# Patient Record
Sex: MALE | Race: WHITE | NOT HISPANIC OR LATINO | Employment: OTHER | URBAN - NONMETROPOLITAN AREA
[De-identification: names, ages, dates, MRNs, and addresses within clinical notes are randomized per-mention and may not be internally consistent; named-entity substitution may affect disease eponyms.]

---

## 2018-10-27 ENCOUNTER — ANESTHESIA EVENT (OUTPATIENT)
Dept: PERIOP | Facility: HOSPITAL | Age: 83
End: 2018-10-27

## 2018-10-27 ENCOUNTER — APPOINTMENT (OUTPATIENT)
Dept: GENERAL RADIOLOGY | Facility: HOSPITAL | Age: 83
End: 2018-10-27

## 2018-10-27 ENCOUNTER — HOSPITAL ENCOUNTER (INPATIENT)
Facility: HOSPITAL | Age: 83
LOS: 4 days | Discharge: NURSING, REHAB OR LONG-TERM CARE | End: 2018-10-31
Attending: ORTHOPAEDIC SURGERY | Admitting: ORTHOPAEDIC SURGERY

## 2018-10-27 ENCOUNTER — ANESTHESIA (OUTPATIENT)
Dept: PERIOP | Facility: HOSPITAL | Age: 83
End: 2018-10-27

## 2018-10-27 DIAGNOSIS — Z79.01 ANTICOAGULATION GOAL OF INR 1.5 TO 2.5: ICD-10-CM

## 2018-10-27 DIAGNOSIS — S72.142D CLOSED DISPLACED INTERTROCHANTERIC FRACTURE OF LEFT FEMUR WITH ROUTINE HEALING, SUBSEQUENT ENCOUNTER: Primary | ICD-10-CM

## 2018-10-27 DIAGNOSIS — Z51.81 ANTICOAGULATION GOAL OF INR 1.5 TO 2.5: ICD-10-CM

## 2018-10-27 DIAGNOSIS — Z74.09 IMPAIRED FUNCTIONAL MOBILITY AND ACTIVITY TOLERANCE: ICD-10-CM

## 2018-10-27 PROBLEM — S72.009A HIP FRACTURE (HCC): Status: ACTIVE | Noted: 2018-10-27

## 2018-10-27 PROBLEM — S72.142A CLOSED DISPLACED INTERTROCHANTERIC FRACTURE OF LEFT FEMUR (HCC): Status: ACTIVE | Noted: 2018-10-27

## 2018-10-27 LAB
ABO GROUP BLD: NORMAL
ALBUMIN SERPL-MCNC: 3.3 G/DL (ref 3.5–5)
ALBUMIN/GLOB SERPL: 1.3 G/DL (ref 1.1–2.5)
ALP SERPL-CCNC: 92 U/L (ref 24–120)
ALT SERPL W P-5'-P-CCNC: 21 U/L (ref 0–54)
ANION GAP SERPL CALCULATED.3IONS-SCNC: 8 MMOL/L (ref 4–13)
AST SERPL-CCNC: 16 U/L (ref 7–45)
BILIRUB SERPL-MCNC: 0.6 MG/DL (ref 0.1–1)
BLD GP AB SCN SERPL QL: NEGATIVE
BUN BLD-MCNC: 22 MG/DL (ref 5–21)
BUN/CREAT SERPL: 25.3 (ref 7–25)
CALCIUM SPEC-SCNC: 8.9 MG/DL (ref 8.4–10.4)
CHLORIDE SERPL-SCNC: 99 MMOL/L (ref 98–110)
CO2 SERPL-SCNC: 28 MMOL/L (ref 24–31)
CREAT BLD-MCNC: 0.87 MG/DL (ref 0.5–1.4)
DEPRECATED RDW RBC AUTO: 43.9 FL (ref 40–54)
ERYTHROCYTE [DISTWIDTH] IN BLOOD BY AUTOMATED COUNT: 14.2 % (ref 12–15)
GFR SERPL CREATININE-BSD FRML MDRD: 83 ML/MIN/1.73
GLOBULIN UR ELPH-MCNC: 2.5 GM/DL
GLUCOSE BLD-MCNC: 121 MG/DL (ref 70–100)
HCT VFR BLD AUTO: 30.3 % (ref 40–52)
HGB BLD-MCNC: 10.2 G/DL (ref 14–18)
MCH RBC QN AUTO: 28.6 PG (ref 28–32)
MCHC RBC AUTO-ENTMCNC: 33.7 G/DL (ref 33–36)
MCV RBC AUTO: 84.9 FL (ref 82–95)
PLATELET # BLD AUTO: 256 10*3/MM3 (ref 130–400)
PMV BLD AUTO: 10.5 FL (ref 6–12)
POTASSIUM BLD-SCNC: 4.2 MMOL/L (ref 3.5–5.3)
PROT SERPL-MCNC: 5.8 G/DL (ref 6.3–8.7)
RBC # BLD AUTO: 3.57 10*6/MM3 (ref 4.8–5.9)
RH BLD: POSITIVE
SODIUM BLD-SCNC: 135 MMOL/L (ref 135–145)
T&S EXPIRATION DATE: NORMAL
WBC NRBC COR # BLD: 14.88 10*3/MM3 (ref 4.8–10.8)

## 2018-10-27 PROCEDURE — 25010000002 DEXAMETHASONE PER 1 MG: Performed by: NURSE ANESTHETIST, CERTIFIED REGISTERED

## 2018-10-27 PROCEDURE — 86920 COMPATIBILITY TEST SPIN: CPT

## 2018-10-27 PROCEDURE — 93005 ELECTROCARDIOGRAM TRACING: CPT | Performed by: ORTHOPAEDIC SURGERY

## 2018-10-27 PROCEDURE — 80053 COMPREHEN METABOLIC PANEL: CPT | Performed by: ORTHOPAEDIC SURGERY

## 2018-10-27 PROCEDURE — 86901 BLOOD TYPING SEROLOGIC RH(D): CPT | Performed by: ORTHOPAEDIC SURGERY

## 2018-10-27 PROCEDURE — 86900 BLOOD TYPING SEROLOGIC ABO: CPT

## 2018-10-27 PROCEDURE — 94799 UNLISTED PULMONARY SVC/PX: CPT

## 2018-10-27 PROCEDURE — 76000 FLUOROSCOPY <1 HR PHYS/QHP: CPT

## 2018-10-27 PROCEDURE — 36430 TRANSFUSION BLD/BLD COMPNT: CPT

## 2018-10-27 PROCEDURE — 25010000003 CEFAZOLIN 1-4 GM/50ML-% SOLUTION: Performed by: ORTHOPAEDIC SURGERY

## 2018-10-27 PROCEDURE — C1769 GUIDE WIRE: HCPCS | Performed by: ORTHOPAEDIC SURGERY

## 2018-10-27 PROCEDURE — C1713 ANCHOR/SCREW BN/BN,TIS/BN: HCPCS | Performed by: ORTHOPAEDIC SURGERY

## 2018-10-27 PROCEDURE — 94760 N-INVAS EAR/PLS OXIMETRY 1: CPT

## 2018-10-27 PROCEDURE — 25010000002 PROPOFOL 10 MG/ML EMULSION: Performed by: NURSE ANESTHETIST, CERTIFIED REGISTERED

## 2018-10-27 PROCEDURE — 85027 COMPLETE CBC AUTOMATED: CPT | Performed by: ORTHOPAEDIC SURGERY

## 2018-10-27 PROCEDURE — 25010000002 ONDANSETRON PER 1 MG: Performed by: NURSE ANESTHETIST, CERTIFIED REGISTERED

## 2018-10-27 PROCEDURE — 73502 X-RAY EXAM HIP UNI 2-3 VIEWS: CPT

## 2018-10-27 PROCEDURE — 25010000002 FENTANYL CITRATE (PF) 100 MCG/2ML SOLUTION: Performed by: NURSE ANESTHETIST, CERTIFIED REGISTERED

## 2018-10-27 PROCEDURE — 0QH706Z INSERTION OF INTRAMEDULLARY INTERNAL FIXATION DEVICE INTO LEFT UPPER FEMUR, OPEN APPROACH: ICD-10-PCS | Performed by: ORTHOPAEDIC SURGERY

## 2018-10-27 PROCEDURE — 25010000003 MORPHINE PER 100 MG: Performed by: ORTHOPAEDIC SURGERY

## 2018-10-27 PROCEDURE — 25010000003 CEFAZOLIN PER 500 MG: Performed by: NURSE ANESTHETIST, CERTIFIED REGISTERED

## 2018-10-27 PROCEDURE — 30233N1 TRANSFUSION OF NONAUTOLOGOUS RED BLOOD CELLS INTO PERIPHERAL VEIN, PERCUTANEOUS APPROACH: ICD-10-PCS | Performed by: ORTHOPAEDIC SURGERY

## 2018-10-27 PROCEDURE — 71045 X-RAY EXAM CHEST 1 VIEW: CPT

## 2018-10-27 PROCEDURE — 86900 BLOOD TYPING SEROLOGIC ABO: CPT | Performed by: ORTHOPAEDIC SURGERY

## 2018-10-27 PROCEDURE — 25010000002 FENTANYL CITRATE (PF) 250 MCG/5ML SOLUTION: Performed by: NURSE ANESTHETIST, CERTIFIED REGISTERED

## 2018-10-27 PROCEDURE — 86850 RBC ANTIBODY SCREEN: CPT | Performed by: ORTHOPAEDIC SURGERY

## 2018-10-27 PROCEDURE — 93010 ELECTROCARDIOGRAM REPORT: CPT | Performed by: INTERNAL MEDICINE

## 2018-10-27 PROCEDURE — P9016 RBC LEUKOCYTES REDUCED: HCPCS

## 2018-10-27 DEVICE — NAIL FEM TFN ADV PROX 130D 11X235MM LT STRL: Type: IMPLANTABLE DEVICE | Status: FUNCTIONAL

## 2018-10-27 DEVICE — IMPLANTABLE DEVICE: Type: IMPLANTABLE DEVICE | Status: FUNCTIONAL

## 2018-10-27 DEVICE — SCRW LK STRDRV TI 5X36MM STRL: Type: IMPLANTABLE DEVICE | Status: FUNCTIONAL

## 2018-10-27 RX ORDER — ONDANSETRON 2 MG/ML
INJECTION INTRAMUSCULAR; INTRAVENOUS AS NEEDED
Status: DISCONTINUED | OUTPATIENT
Start: 2018-10-27 | End: 2018-10-27 | Stop reason: SURG

## 2018-10-27 RX ORDER — HYDROCODONE BITARTRATE AND ACETAMINOPHEN 5; 325 MG/1; MG/1
1 TABLET ORAL EVERY 4 HOURS PRN
Status: DISCONTINUED | OUTPATIENT
Start: 2018-10-27 | End: 2018-10-31 | Stop reason: HOSPADM

## 2018-10-27 RX ORDER — PROPOFOL 10 MG/ML
VIAL (ML) INTRAVENOUS AS NEEDED
Status: DISCONTINUED | OUTPATIENT
Start: 2018-10-27 | End: 2018-10-27 | Stop reason: SURG

## 2018-10-27 RX ORDER — MORPHINE SULFATE/0.9% NACL/PF 1 MG/ML
SYRINGE (ML) INJECTION CONTINUOUS
Status: DISCONTINUED | OUTPATIENT
Start: 2018-10-27 | End: 2018-10-28

## 2018-10-27 RX ORDER — LANOLIN ALCOHOL/MO/W.PET/CERES
1000 CREAM (GRAM) TOPICAL
COMMUNITY

## 2018-10-27 RX ORDER — WARFARIN SODIUM 3 MG/1
3 TABLET ORAL
Status: DISCONTINUED | OUTPATIENT
Start: 2018-10-28 | End: 2018-10-31 | Stop reason: HOSPADM

## 2018-10-27 RX ORDER — HYDRALAZINE HYDROCHLORIDE 20 MG/ML
5 INJECTION INTRAMUSCULAR; INTRAVENOUS
Status: DISCONTINUED | OUTPATIENT
Start: 2018-10-27 | End: 2018-10-27 | Stop reason: HOSPADM

## 2018-10-27 RX ORDER — MEPERIDINE HYDROCHLORIDE 25 MG/ML
12.5 INJECTION INTRAMUSCULAR; INTRAVENOUS; SUBCUTANEOUS
Status: DISCONTINUED | OUTPATIENT
Start: 2018-10-27 | End: 2018-10-27 | Stop reason: HOSPADM

## 2018-10-27 RX ORDER — SODIUM CHLORIDE, SODIUM LACTATE, POTASSIUM CHLORIDE, CALCIUM CHLORIDE 600; 310; 30; 20 MG/100ML; MG/100ML; MG/100ML; MG/100ML
100 INJECTION, SOLUTION INTRAVENOUS CONTINUOUS
Status: DISCONTINUED | OUTPATIENT
Start: 2018-10-27 | End: 2018-10-27

## 2018-10-27 RX ORDER — SODIUM CHLORIDE, SODIUM LACTATE, POTASSIUM CHLORIDE, CALCIUM CHLORIDE 600; 310; 30; 20 MG/100ML; MG/100ML; MG/100ML; MG/100ML
75 INJECTION, SOLUTION INTRAVENOUS CONTINUOUS
Status: DISCONTINUED | OUTPATIENT
Start: 2018-10-27 | End: 2018-10-28

## 2018-10-27 RX ORDER — PHENYLEPHRINE HCL IN 0.9% NACL 0.8MG/10ML
SYRINGE (ML) INTRAVENOUS AS NEEDED
Status: DISCONTINUED | OUTPATIENT
Start: 2018-10-27 | End: 2018-10-27 | Stop reason: SURG

## 2018-10-27 RX ORDER — FENTANYL CITRATE 50 UG/ML
INJECTION, SOLUTION INTRAMUSCULAR; INTRAVENOUS AS NEEDED
Status: DISCONTINUED | OUTPATIENT
Start: 2018-10-27 | End: 2018-10-27 | Stop reason: SURG

## 2018-10-27 RX ORDER — LIDOCAINE HYDROCHLORIDE 20 MG/ML
INJECTION, SOLUTION INFILTRATION; PERINEURAL AS NEEDED
Status: DISCONTINUED | OUTPATIENT
Start: 2018-10-27 | End: 2018-10-27 | Stop reason: SURG

## 2018-10-27 RX ORDER — DEXAMETHASONE SODIUM PHOSPHATE 4 MG/ML
INJECTION, SOLUTION INTRA-ARTICULAR; INTRALESIONAL; INTRAMUSCULAR; INTRAVENOUS; SOFT TISSUE AS NEEDED
Status: DISCONTINUED | OUTPATIENT
Start: 2018-10-27 | End: 2018-10-27 | Stop reason: SURG

## 2018-10-27 RX ORDER — BISACODYL 5 MG/1
10 TABLET, DELAYED RELEASE ORAL DAILY PRN
COMMUNITY

## 2018-10-27 RX ORDER — ONDANSETRON 2 MG/ML
4 INJECTION INTRAMUSCULAR; INTRAVENOUS EVERY 6 HOURS PRN
Status: DISCONTINUED | OUTPATIENT
Start: 2018-10-27 | End: 2018-10-31 | Stop reason: HOSPADM

## 2018-10-27 RX ORDER — NALOXONE HCL 0.4 MG/ML
0.04 VIAL (ML) INJECTION AS NEEDED
Status: DISCONTINUED | OUTPATIENT
Start: 2018-10-27 | End: 2018-10-27 | Stop reason: HOSPADM

## 2018-10-27 RX ORDER — ONDANSETRON 2 MG/ML
4 INJECTION INTRAMUSCULAR; INTRAVENOUS AS NEEDED
Status: DISCONTINUED | OUTPATIENT
Start: 2018-10-27 | End: 2018-10-27 | Stop reason: HOSPADM

## 2018-10-27 RX ORDER — QUETIAPINE FUMARATE 50 MG/1
50 TABLET, FILM COATED ORAL NIGHTLY
COMMUNITY

## 2018-10-27 RX ORDER — HYDROCHLOROTHIAZIDE 25 MG/1
25 TABLET ORAL DAILY
COMMUNITY

## 2018-10-27 RX ORDER — MAGNESIUM HYDROXIDE 1200 MG/15ML
LIQUID ORAL AS NEEDED
Status: DISCONTINUED | OUTPATIENT
Start: 2018-10-27 | End: 2018-10-27 | Stop reason: HOSPADM

## 2018-10-27 RX ORDER — ERGOCALCIFEROL 1.25 MG/1
50000 CAPSULE ORAL WEEKLY
COMMUNITY

## 2018-10-27 RX ORDER — CEFAZOLIN SODIUM 1 G/3ML
INJECTION, POWDER, FOR SOLUTION INTRAMUSCULAR; INTRAVENOUS AS NEEDED
Status: DISCONTINUED | OUTPATIENT
Start: 2018-10-27 | End: 2018-10-27 | Stop reason: SURG

## 2018-10-27 RX ORDER — ONDANSETRON 4 MG/1
4 TABLET, ORALLY DISINTEGRATING ORAL EVERY 6 HOURS PRN
Status: DISCONTINUED | OUTPATIENT
Start: 2018-10-27 | End: 2018-10-31 | Stop reason: HOSPADM

## 2018-10-27 RX ORDER — BISACODYL 5 MG/1
10 TABLET, DELAYED RELEASE ORAL DAILY PRN
Status: DISCONTINUED | OUTPATIENT
Start: 2018-10-27 | End: 2018-10-31 | Stop reason: HOSPADM

## 2018-10-27 RX ORDER — METOCLOPRAMIDE HYDROCHLORIDE 5 MG/ML
5 INJECTION INTRAMUSCULAR; INTRAVENOUS
Status: DISCONTINUED | OUTPATIENT
Start: 2018-10-27 | End: 2018-10-27 | Stop reason: HOSPADM

## 2018-10-27 RX ORDER — OXYCODONE AND ACETAMINOPHEN 10; 325 MG/1; MG/1
1 TABLET ORAL ONCE AS NEEDED
Status: DISCONTINUED | OUTPATIENT
Start: 2018-10-27 | End: 2018-10-27 | Stop reason: HOSPADM

## 2018-10-27 RX ORDER — MORPHINE SULFATE 2 MG/ML
2 INJECTION, SOLUTION INTRAMUSCULAR; INTRAVENOUS
Status: DISCONTINUED | OUTPATIENT
Start: 2018-10-27 | End: 2018-10-27 | Stop reason: HOSPADM

## 2018-10-27 RX ORDER — HYDROCHLOROTHIAZIDE 25 MG/1
25 TABLET ORAL DAILY
Status: DISCONTINUED | OUTPATIENT
Start: 2018-10-27 | End: 2018-10-31 | Stop reason: HOSPADM

## 2018-10-27 RX ORDER — QUETIAPINE FUMARATE 25 MG/1
50 TABLET, FILM COATED ORAL NIGHTLY
Status: DISCONTINUED | OUTPATIENT
Start: 2018-10-27 | End: 2018-10-31 | Stop reason: HOSPADM

## 2018-10-27 RX ORDER — POLYETHYLENE GLYCOL 3350 17 G/17G
17 POWDER, FOR SOLUTION ORAL DAILY
COMMUNITY

## 2018-10-27 RX ORDER — FLUMAZENIL 0.1 MG/ML
0.2 INJECTION INTRAVENOUS AS NEEDED
Status: DISCONTINUED | OUTPATIENT
Start: 2018-10-27 | End: 2018-10-27 | Stop reason: HOSPADM

## 2018-10-27 RX ORDER — LABETALOL HYDROCHLORIDE 5 MG/ML
5 INJECTION, SOLUTION INTRAVENOUS
Status: DISCONTINUED | OUTPATIENT
Start: 2018-10-27 | End: 2018-10-27 | Stop reason: HOSPADM

## 2018-10-27 RX ORDER — POLYETHYLENE GLYCOL 3350 17 G/17G
17 POWDER, FOR SOLUTION ORAL DAILY
Status: DISCONTINUED | OUTPATIENT
Start: 2018-10-27 | End: 2018-10-31 | Stop reason: HOSPADM

## 2018-10-27 RX ORDER — ONDANSETRON 4 MG/1
4 TABLET, FILM COATED ORAL EVERY 6 HOURS PRN
Status: DISCONTINUED | OUTPATIENT
Start: 2018-10-27 | End: 2018-10-31 | Stop reason: HOSPADM

## 2018-10-27 RX ORDER — ROCURONIUM BROMIDE 10 MG/ML
INJECTION, SOLUTION INTRAVENOUS AS NEEDED
Status: DISCONTINUED | OUTPATIENT
Start: 2018-10-27 | End: 2018-10-27 | Stop reason: SURG

## 2018-10-27 RX ORDER — IPRATROPIUM BROMIDE AND ALBUTEROL SULFATE 2.5; .5 MG/3ML; MG/3ML
3 SOLUTION RESPIRATORY (INHALATION) ONCE AS NEEDED
Status: DISCONTINUED | OUTPATIENT
Start: 2018-10-27 | End: 2018-10-27 | Stop reason: HOSPADM

## 2018-10-27 RX ORDER — CEFAZOLIN SODIUM 1 G/50ML
1 INJECTION, SOLUTION INTRAVENOUS EVERY 8 HOURS
Status: COMPLETED | OUTPATIENT
Start: 2018-10-27 | End: 2018-10-28

## 2018-10-27 RX ORDER — FENTANYL CITRATE 50 UG/ML
25 INJECTION, SOLUTION INTRAMUSCULAR; INTRAVENOUS AS NEEDED
Status: DISCONTINUED | OUTPATIENT
Start: 2018-10-27 | End: 2018-10-27 | Stop reason: HOSPADM

## 2018-10-27 RX ORDER — ONDANSETRON 2 MG/ML
INJECTION INTRAMUSCULAR; INTRAVENOUS AS NEEDED
Status: DISCONTINUED | OUTPATIENT
Start: 2018-10-27 | End: 2018-10-27

## 2018-10-27 RX ADMIN — CEFAZOLIN 2 G: 1 INJECTION, POWDER, FOR SOLUTION INTRAVENOUS at 10:38

## 2018-10-27 RX ADMIN — SODIUM CHLORIDE, POTASSIUM CHLORIDE, SODIUM LACTATE AND CALCIUM CHLORIDE 75 ML/HR: 600; 310; 30; 20 INJECTION, SOLUTION INTRAVENOUS at 13:33

## 2018-10-27 RX ADMIN — Medication 80 MCG: at 11:21

## 2018-10-27 RX ADMIN — FENTANYL CITRATE 50 MCG: 50 INJECTION INTRAMUSCULAR; INTRAVENOUS at 12:10

## 2018-10-27 RX ADMIN — PROPOFOL 120 MG: 10 INJECTION, EMULSION INTRAVENOUS at 10:19

## 2018-10-27 RX ADMIN — SODIUM CHLORIDE, POTASSIUM CHLORIDE, SODIUM LACTATE AND CALCIUM CHLORIDE 75 ML/HR: 600; 310; 30; 20 INJECTION, SOLUTION INTRAVENOUS at 19:55

## 2018-10-27 RX ADMIN — QUETIAPINE FUMARATE 50 MG: 25 TABLET, FILM COATED ORAL at 20:53

## 2018-10-27 RX ADMIN — SODIUM CHLORIDE, POTASSIUM CHLORIDE, SODIUM LACTATE AND CALCIUM CHLORIDE 100 ML/HR: 600; 310; 30; 20 INJECTION, SOLUTION INTRAVENOUS at 04:16

## 2018-10-27 RX ADMIN — Medication 160 MCG: at 11:44

## 2018-10-27 RX ADMIN — ONDANSETRON HYDROCHLORIDE 4 MG: 2 SOLUTION INTRAMUSCULAR; INTRAVENOUS at 12:08

## 2018-10-27 RX ADMIN — DEXAMETHASONE SODIUM PHOSPHATE 8 MG: 4 INJECTION, SOLUTION INTRAMUSCULAR; INTRAVENOUS at 10:38

## 2018-10-27 RX ADMIN — Medication 160 MCG: at 11:30

## 2018-10-27 RX ADMIN — ROCURONIUM BROMIDE 20 MG: 10 INJECTION INTRAVENOUS at 10:19

## 2018-10-27 RX ADMIN — SODIUM CHLORIDE, POTASSIUM CHLORIDE, SODIUM LACTATE AND CALCIUM CHLORIDE 100 ML/HR: 600; 310; 30; 20 INJECTION, SOLUTION INTRAVENOUS at 13:13

## 2018-10-27 RX ADMIN — FENTANYL CITRATE 100 MCG: 50 INJECTION, SOLUTION INTRAMUSCULAR; INTRAVENOUS at 10:18

## 2018-10-27 RX ADMIN — FENTANYL CITRATE 100 MCG: 50 INJECTION INTRAMUSCULAR; INTRAVENOUS at 11:07

## 2018-10-27 RX ADMIN — LIDOCAINE HYDROCHLORIDE 100 MG: 20 INJECTION, SOLUTION INFILTRATION; PERINEURAL at 10:19

## 2018-10-27 RX ADMIN — LIDOCAINE HYDROCHLORIDE 100 MG: 20 INJECTION, SOLUTION INFILTRATION; PERINEURAL at 11:07

## 2018-10-27 RX ADMIN — FENTANYL CITRATE 50 MCG: 50 INJECTION INTRAMUSCULAR; INTRAVENOUS at 10:50

## 2018-10-27 RX ADMIN — MORPHINE SULFATE: 25 INJECTION, SOLUTION, CONCENTRATE INTRAVENOUS at 04:35

## 2018-10-27 RX ADMIN — FENTANYL CITRATE 50 MCG: 50 INJECTION INTRAMUSCULAR; INTRAVENOUS at 10:30

## 2018-10-27 RX ADMIN — CEFAZOLIN SODIUM 1 G: 1 INJECTION, SOLUTION INTRAVENOUS at 18:00

## 2018-10-27 NOTE — ANESTHESIA POSTPROCEDURE EVALUATION
Patient: Burke Reinoso    Procedure Summary     Date:  10/27/18 Room / Location:  [facility] OR  /  [facility] OR    Anesthesia Start:  1014 Anesthesia Stop:  1219    Procedure:  HIP TROCANTERIC NAILING SHORT WITH INTRAMEDULLARY HIP SCREW (Left Hip) Diagnosis:      Surgeon:  Juan Colon MD Provider:  Sonya Peralta CRNA    Anesthesia Type:  general ASA Status:  2          Anesthesia Type: No value filed.  Last vitals  BP   148/62 (10/27/18 1250)   Temp   98.3 °F (36.8 °C) (10/27/18 1216)   Pulse   88 (10/27/18 1255)   Resp   18 (10/27/18 1245)     SpO2   (!) 80 % (10/27/18 1255)     Post Anesthesia Care and Evaluation    Patient location during evaluation: PACU  Patient participation: complete - patient participated  Level of consciousness: awake  Pain score: 2  Pain management: adequate  Airway patency: patent  PONV Status: none  Cardiovascular status: acceptable  Respiratory status: acceptable  Hydration status: acceptable

## 2018-10-27 NOTE — ANESTHESIA PREPROCEDURE EVALUATION
Anesthesia Evaluation     Patient summary reviewed and Nursing notes reviewed   no history of anesthetic complications:  NPO Solid Status: > 8 hours  NPO Liquid Status: > 8 hours           Airway   Mallampati: I  TM distance: >3 FB  Neck ROM: full  No difficulty expected  Dental - normal exam     Pulmonary - negative pulmonary ROS and normal exam   Cardiovascular - normal exam  Exercise tolerance: good (4-7 METS)    (+) hypertension well controlled,       Neuro/Psych  (+) dementia,     GI/Hepatic/Renal/Endo    (+)  GERD,      Musculoskeletal     (+) gait problem (daughter states he has began to shuffle, and had difficulty moving from a seated to standing position.),   Abdominal  - normal exam    Bowel sounds: normal.   Substance History - negative use     OB/GYN negative ob/gyn ROS         Other   (+) arthritis     ROS/Med Hx Other: Discussed patient history with patient's daughter.            Lab Results   Component Value Date    WBC 14.88 (H) 10/27/2018    HGB 10.2 (L) 10/27/2018    HCT 30.3 (L) 10/27/2018    MCV 84.9 10/27/2018     10/27/2018              Anesthesia Plan    ASA 2     general     intravenous induction   Anesthetic plan, all risks, benefits, and alternatives have been provided, discussed and informed consent has been obtained with: patient and child.  Use of blood products discussed with child and patient  Consented to blood products.

## 2018-10-27 NOTE — ANESTHESIA PROCEDURE NOTES
Airway  Urgency: elective    Airway not difficult    General Information and Staff    Patient location during procedure: OR  CRNA: SANGITA NEVILLE    Indications and Patient Condition  Indications for airway management: airway protection    Preoxygenated: yes  Mask difficulty assessment: 1 - vent by mask    Final Airway Details  Final airway type: endotracheal airway      Successful airway: ETT  Cuffed: yes   Successful intubation technique: direct laryngoscopy  Facilitating devices/methods: intubating stylet  Endotracheal tube insertion site: oral  Blade: Vitale  Blade size: 2  ETT size: 7.0 mm  Cormack-Lehane Classification: grade I - full view of glottis  Placement verified by: chest auscultation and capnometry   Cuff volume (mL): 8  Measured from: lips  ETT to lips (cm): 22  Number of attempts at approach: 1    Additional Comments  Easy, atraumatic intubation

## 2018-10-28 LAB
ABO + RH BLD: NORMAL
ABO + RH BLD: NORMAL
BH BB BLOOD EXPIRATION DATE: NORMAL
BH BB BLOOD EXPIRATION DATE: NORMAL
BH BB BLOOD TYPE BARCODE: 5100
BH BB BLOOD TYPE BARCODE: 5100
BH BB DISPENSE STATUS: NORMAL
BH BB DISPENSE STATUS: NORMAL
BH BB PRODUCT CODE: NORMAL
BH BB PRODUCT CODE: NORMAL
BH BB UNIT NUMBER: NORMAL
BH BB UNIT NUMBER: NORMAL
CROSSMATCH INTERPRETATION: NORMAL
CROSSMATCH INTERPRETATION: NORMAL
HCT VFR BLD AUTO: 28.8 % (ref 40–52)
HGB BLD-MCNC: 9.8 G/DL (ref 14–18)
INR PPP: 1.15 (ref 0.91–1.09)
PROTHROMBIN TIME: 15.1 SECONDS (ref 11.9–14.6)
UNIT  ABO: NORMAL
UNIT  ABO: NORMAL
UNIT  RH: NORMAL
UNIT  RH: NORMAL

## 2018-10-28 PROCEDURE — 25010000003 CEFAZOLIN 1-4 GM/50ML-% SOLUTION: Performed by: ORTHOPAEDIC SURGERY

## 2018-10-28 PROCEDURE — 97530 THERAPEUTIC ACTIVITIES: CPT

## 2018-10-28 PROCEDURE — 97161 PT EVAL LOW COMPLEX 20 MIN: CPT | Performed by: PHYSICAL THERAPIST

## 2018-10-28 PROCEDURE — G8978 MOBILITY CURRENT STATUS: HCPCS | Performed by: PHYSICAL THERAPIST

## 2018-10-28 PROCEDURE — 25010000003 MORPHINE PER 100 MG: Performed by: ORTHOPAEDIC SURGERY

## 2018-10-28 PROCEDURE — 85610 PROTHROMBIN TIME: CPT | Performed by: ORTHOPAEDIC SURGERY

## 2018-10-28 PROCEDURE — 94799 UNLISTED PULMONARY SVC/PX: CPT

## 2018-10-28 PROCEDURE — G8979 MOBILITY GOAL STATUS: HCPCS | Performed by: PHYSICAL THERAPIST

## 2018-10-28 PROCEDURE — 85014 HEMATOCRIT: CPT | Performed by: ORTHOPAEDIC SURGERY

## 2018-10-28 PROCEDURE — 85018 HEMOGLOBIN: CPT | Performed by: ORTHOPAEDIC SURGERY

## 2018-10-28 PROCEDURE — 94760 N-INVAS EAR/PLS OXIMETRY 1: CPT

## 2018-10-28 RX ADMIN — QUETIAPINE FUMARATE 50 MG: 25 TABLET, FILM COATED ORAL at 20:55

## 2018-10-28 RX ADMIN — POLYETHYLENE GLYCOL (3350) 17 G: 17 POWDER, FOR SOLUTION ORAL at 09:39

## 2018-10-28 RX ADMIN — WARFARIN SODIUM 3 MG: 3 TABLET ORAL at 17:47

## 2018-10-28 RX ADMIN — HYDROCODONE BITARTRATE AND ACETAMINOPHEN 1 TABLET: 5; 325 TABLET ORAL at 19:50

## 2018-10-28 RX ADMIN — CEFAZOLIN SODIUM 1 G: 1 INJECTION, SOLUTION INTRAVENOUS at 02:21

## 2018-10-28 RX ADMIN — HYDROCODONE BITARTRATE AND ACETAMINOPHEN 1 TABLET: 5; 325 TABLET ORAL at 09:43

## 2018-10-28 RX ADMIN — MORPHINE SULFATE: 25 INJECTION, SOLUTION, CONCENTRATE INTRAVENOUS at 04:59

## 2018-10-28 RX ADMIN — HYDROCODONE BITARTRATE AND ACETAMINOPHEN 1 TABLET: 5; 325 TABLET ORAL at 15:40

## 2018-10-29 LAB
HCT VFR BLD AUTO: 29.3 % (ref 40–52)
HGB BLD-MCNC: 9.7 G/DL (ref 14–18)
INR PPP: 1.1 (ref 0.91–1.09)
PROTHROMBIN TIME: 14.6 SECONDS (ref 11.9–14.6)

## 2018-10-29 PROCEDURE — 85014 HEMATOCRIT: CPT | Performed by: ORTHOPAEDIC SURGERY

## 2018-10-29 PROCEDURE — 85610 PROTHROMBIN TIME: CPT | Performed by: ORTHOPAEDIC SURGERY

## 2018-10-29 PROCEDURE — 85018 HEMOGLOBIN: CPT | Performed by: ORTHOPAEDIC SURGERY

## 2018-10-29 PROCEDURE — 97530 THERAPEUTIC ACTIVITIES: CPT

## 2018-10-29 RX ADMIN — POLYETHYLENE GLYCOL (3350) 17 G: 17 POWDER, FOR SOLUTION ORAL at 08:27

## 2018-10-29 RX ADMIN — HYDROCODONE BITARTRATE AND ACETAMINOPHEN 1 TABLET: 5; 325 TABLET ORAL at 00:07

## 2018-10-29 RX ADMIN — HYDROCODONE BITARTRATE AND ACETAMINOPHEN 1 TABLET: 5; 325 TABLET ORAL at 04:27

## 2018-10-29 RX ADMIN — HYDROCODONE BITARTRATE AND ACETAMINOPHEN 1 TABLET: 5; 325 TABLET ORAL at 20:12

## 2018-10-29 RX ADMIN — WARFARIN SODIUM 3 MG: 3 TABLET ORAL at 17:06

## 2018-10-29 RX ADMIN — ONDANSETRON 4 MG: 4 TABLET, ORALLY DISINTEGRATING ORAL at 04:27

## 2018-10-29 RX ADMIN — HYDROCODONE BITARTRATE AND ACETAMINOPHEN 1 TABLET: 5; 325 TABLET ORAL at 10:10

## 2018-10-29 RX ADMIN — QUETIAPINE FUMARATE 50 MG: 25 TABLET, FILM COATED ORAL at 20:12

## 2018-10-30 LAB
HCT VFR BLD AUTO: 28.2 % (ref 40–52)
HGB BLD-MCNC: 9.2 G/DL (ref 14–18)
INR PPP: 1.26 (ref 0.91–1.09)
PROTHROMBIN TIME: 16.2 SECONDS (ref 11.9–14.6)

## 2018-10-30 PROCEDURE — 85014 HEMATOCRIT: CPT | Performed by: ORTHOPAEDIC SURGERY

## 2018-10-30 PROCEDURE — 85018 HEMOGLOBIN: CPT | Performed by: ORTHOPAEDIC SURGERY

## 2018-10-30 PROCEDURE — 97110 THERAPEUTIC EXERCISES: CPT

## 2018-10-30 PROCEDURE — 85610 PROTHROMBIN TIME: CPT | Performed by: ORTHOPAEDIC SURGERY

## 2018-10-30 PROCEDURE — 97116 GAIT TRAINING THERAPY: CPT

## 2018-10-30 PROCEDURE — 97530 THERAPEUTIC ACTIVITIES: CPT

## 2018-10-30 RX ADMIN — HYDROCODONE BITARTRATE AND ACETAMINOPHEN 1 TABLET: 5; 325 TABLET ORAL at 20:05

## 2018-10-30 RX ADMIN — HYDROCODONE BITARTRATE AND ACETAMINOPHEN 1 TABLET: 5; 325 TABLET ORAL at 13:20

## 2018-10-30 RX ADMIN — POLYETHYLENE GLYCOL (3350) 17 G: 17 POWDER, FOR SOLUTION ORAL at 08:43

## 2018-10-30 RX ADMIN — WARFARIN SODIUM 3 MG: 3 TABLET ORAL at 18:13

## 2018-10-30 RX ADMIN — QUETIAPINE FUMARATE 50 MG: 25 TABLET, FILM COATED ORAL at 20:03

## 2018-10-30 RX ADMIN — HYDROCODONE BITARTRATE AND ACETAMINOPHEN 1 TABLET: 5; 325 TABLET ORAL at 07:48

## 2018-10-31 VITALS
DIASTOLIC BLOOD PRESSURE: 55 MMHG | BODY MASS INDEX: 22.13 KG/M2 | HEIGHT: 70 IN | SYSTOLIC BLOOD PRESSURE: 103 MMHG | WEIGHT: 154.6 LBS | OXYGEN SATURATION: 93 % | TEMPERATURE: 98.1 F | RESPIRATION RATE: 16 BRPM | HEART RATE: 63 BPM

## 2018-10-31 LAB
ABO + RH BLD: NORMAL
ABO + RH BLD: NORMAL
BH BB BLOOD EXPIRATION DATE: NORMAL
BH BB BLOOD EXPIRATION DATE: NORMAL
BH BB BLOOD TYPE BARCODE: 5100
BH BB BLOOD TYPE BARCODE: 5100
BH BB DISPENSE STATUS: NORMAL
BH BB DISPENSE STATUS: NORMAL
BH BB PRODUCT CODE: NORMAL
BH BB PRODUCT CODE: NORMAL
BH BB UNIT NUMBER: NORMAL
BH BB UNIT NUMBER: NORMAL
CROSSMATCH INTERPRETATION: NORMAL
CROSSMATCH INTERPRETATION: NORMAL
UNIT  ABO: NORMAL
UNIT  ABO: NORMAL
UNIT  RH: NORMAL
UNIT  RH: NORMAL

## 2018-10-31 PROCEDURE — 97110 THERAPEUTIC EXERCISES: CPT

## 2018-10-31 PROCEDURE — 97116 GAIT TRAINING THERAPY: CPT

## 2018-10-31 RX ORDER — HYDROCODONE BITARTRATE AND ACETAMINOPHEN 5; 325 MG/1; MG/1
1 TABLET ORAL EVERY 6 HOURS PRN
Qty: 40 TABLET | Refills: 0 | Status: ON HOLD | OUTPATIENT
Start: 2018-10-31 | End: 2018-11-02

## 2018-10-31 RX ORDER — WARFARIN SODIUM 3 MG/1
TABLET ORAL
Qty: 21 TABLET | Refills: 0 | Status: ON HOLD | OUTPATIENT
Start: 2018-10-31 | End: 2018-11-02

## 2018-10-31 RX ADMIN — POLYETHYLENE GLYCOL (3350) 17 G: 17 POWDER, FOR SOLUTION ORAL at 09:02

## 2018-10-31 RX ADMIN — HYDROCODONE BITARTRATE AND ACETAMINOPHEN 1 TABLET: 5; 325 TABLET ORAL at 10:26

## 2018-10-31 RX ADMIN — HYDROCHLOROTHIAZIDE 25 MG: 25 TABLET ORAL at 09:02

## 2018-11-01 ENCOUNTER — APPOINTMENT (OUTPATIENT)
Dept: GENERAL RADIOLOGY | Facility: HOSPITAL | Age: 83
End: 2018-11-01

## 2018-11-01 ENCOUNTER — HOSPITAL ENCOUNTER (INPATIENT)
Facility: HOSPITAL | Age: 83
LOS: 11 days | Discharge: NURSING, REHAB OR LONG-TERM CARE | End: 2018-11-13
Attending: EMERGENCY MEDICINE | Admitting: ORTHOPAEDIC SURGERY

## 2018-11-01 DIAGNOSIS — S72.352A CLOSED DISPLACED COMMINUTED FRACTURE OF SHAFT OF LEFT FEMUR, INITIAL ENCOUNTER (HCC): Primary | ICD-10-CM

## 2018-11-01 DIAGNOSIS — R13.10 DYSPHAGIA, UNSPECIFIED TYPE: ICD-10-CM

## 2018-11-01 DIAGNOSIS — Z74.09 IMPAIRED MOBILITY: ICD-10-CM

## 2018-11-01 LAB
ALBUMIN SERPL-MCNC: 3 G/DL (ref 3.5–5)
ALBUMIN/GLOB SERPL: 1 G/DL (ref 1.1–2.5)
ALP SERPL-CCNC: 80 U/L (ref 24–120)
ALT SERPL W P-5'-P-CCNC: 37 U/L (ref 0–54)
ANION GAP SERPL CALCULATED.3IONS-SCNC: 10 MMOL/L (ref 4–13)
AST SERPL-CCNC: 47 U/L (ref 7–45)
BILIRUB SERPL-MCNC: 1.2 MG/DL (ref 0.1–1)
BUN BLD-MCNC: 26 MG/DL (ref 5–21)
BUN/CREAT SERPL: 29.9 (ref 7–25)
CALCIUM SPEC-SCNC: 8.6 MG/DL (ref 8.4–10.4)
CHLORIDE SERPL-SCNC: 94 MMOL/L (ref 98–110)
CO2 SERPL-SCNC: 31 MMOL/L (ref 24–31)
CREAT BLD-MCNC: 0.87 MG/DL (ref 0.5–1.4)
GFR SERPL CREATININE-BSD FRML MDRD: 83 ML/MIN/1.73
GLOBULIN UR ELPH-MCNC: 2.9 GM/DL
GLUCOSE BLD-MCNC: 129 MG/DL (ref 70–100)
INR PPP: 1.46 (ref 0.91–1.09)
POTASSIUM BLD-SCNC: 4.6 MMOL/L (ref 3.5–5.3)
PROT SERPL-MCNC: 5.9 G/DL (ref 6.3–8.7)
PROTHROMBIN TIME: 18.2 SECONDS (ref 11.9–14.6)
SODIUM BLD-SCNC: 135 MMOL/L (ref 135–145)

## 2018-11-01 PROCEDURE — 73552 X-RAY EXAM OF FEMUR 2/>: CPT

## 2018-11-01 PROCEDURE — 99285 EMERGENCY DEPT VISIT HI MDM: CPT

## 2018-11-01 PROCEDURE — 85025 COMPLETE CBC W/AUTO DIFF WBC: CPT | Performed by: EMERGENCY MEDICINE

## 2018-11-01 PROCEDURE — 85610 PROTHROMBIN TIME: CPT | Performed by: EMERGENCY MEDICINE

## 2018-11-01 PROCEDURE — 80053 COMPREHEN METABOLIC PANEL: CPT | Performed by: EMERGENCY MEDICINE

## 2018-11-01 RX ORDER — SODIUM CHLORIDE 0.9 % (FLUSH) 0.9 %
10 SYRINGE (ML) INJECTION AS NEEDED
Status: DISCONTINUED | OUTPATIENT
Start: 2018-11-01 | End: 2018-11-13 | Stop reason: HOSPADM

## 2018-11-01 NOTE — PAYOR COMM NOTE
"DC TO SNF 10-31-18    [id number]  UR PHONE    [phone]  Kemar Burke WARREN (88 y.o. Male)     Date of Birth Social Security Number Address Home Phone MRN    02/02/1930  [address] [place] [facility] [id number] [phone] [id number]    Denominational Marital Status          Sabianist        Admission Date Admission Type Admitting Provider Attending Provider Department, Room/Bed    10/27/18 Urgent Juan Colon MD  [facility] 3C, 376/1    Discharge Date Discharge Disposition Discharge Destination        10/31/2018 Skilled Nursing Facility (DC - External)              Attending Provider:  (none)   Allergies:  No Known Allergies    Isolation:  None   Infection:  None   Code Status:  Prior    Ht:  177.8 cm (70\")   Wt:  70.1 kg (154 lb 9.6 oz)    Admission Cmt:  None   Principal Problem:  None                Active Insurance as of 10/27/2018     Primary Coverage     Payor Plan Insurance Group Employer/Plan Group    ANTHEM MEDICARE REPLACEMENT ANTHEM MEDICARE ADVANTAGE [id number]     Payor Plan Address Payor Plan Phone Number Effective From Effective To    [address] [phone] 1/1/2018     [facility] [address]       Subscriber Name Subscriber Birth Date Member ID       BURKE ALLEN  2/2/1930 [id number]           Secondary Coverage     Payor Plan Insurance Group Employer/Plan Group    [place]      Payor Plan Address Payor Plan Phone Number Effective From Effective To    [address] [phone] 10/27/2018     [facility] [place] [address]       Subscriber Name Subscriber Birth Date Member ID       BURKE ALLEN JRAna 2/2/1930 [id number]                 Emergency Contacts      (Rel.) Home Phone Work Phone Mobile Phone    Holly Jarvis (Daughter) -- -- [phone]               Operative/Procedure Notes (all)      Juan Colon MD at 10/27/2018  9:47 AM  Version 1 of 1       [facility]  OP NOTE    Patient " Name: Burke Reinoso  Date of Procedure: 10/27/2018     PREOPERATIVE DIAGNOSIS: Comminuted intertrochanteric fracture left hip     POSTOPERATIVE DIAGNOSIS: Same.     PROCEDURE PERFORMED: Trochanteric fixation nailing left hip     SURGEON: Juan Cloon MD      ANESTHESIA: General.    PREPARATION: Routine.    STAFF: Circulator: Fernando Rajput RN  Scrub Person: Azul Renae; Olena Yee  Assistant: Andrea White    ESTIMATED BLOOD LOSS: 200 mL    SPECIMENS: None    COMPLICATIONS: None    INDICATIONS: Burke Reinoso is a 88 y.o. male who sustained a comminuted intertrochanteric fracture of the left hip.  It was felt that stabilization was indicated. The indications, risks, and possible complications of the procedure were explained to the patient, who voiced understanding and wished to proceed with surgery.  At surgery a medium length 11 mm Synthes trochanteric fixation nail was used with a 120 mm blade plate and a 36 mm distal static locking screw     PROCEDURE IN DETAIL: The patient was taken to the operating room and placed on the fracture table in a supine position. After general anesthesia was obtained, the left hip was prepped and draped in a sterile manner.  Traction was placed on the left lower extremity was placed in neutral rotation.  A decision was made due to displacement of the fracture that an open reduction would have to be carried out.  A lateral incision was then made from the tip of the greater trochanter distalward.  The incision was carried through the deep fascia and a Hohmann retractor was placed on the base of the femoral neck and the shaft of the femur was elevated and the neck was displaced posterior sanchez reducing the fracture up.  A bulb-tipped guidewire was inserted down the shaft of the femur reaming over the guidewire was carried out.  The nail was then impacted into position and holding the reduction under a guidewire was advanced up the neck and into the head of  the femur after removing the ball-tipped guidewire.  Reaming over this guidewire was carried out and a blade plate was impacted into position and then locked into position.  Using the out  the distal locking screw was placed through a stab wound in the usual fashion.  The outrigger was then removed and the construct was visualized in 2 planes with the fluoroscope and the fixation appeared stable and anatomical.  The wound was then irrigated and the deep fascia was closed with Vicryl suture followed by chromic on the subcutaneous tissue followed by staples on the skin Sterile dressings were applied. The patient tolerated the procedure well. Sponge and needle counts were correct. The patient was then awakened and extubated in the operating room and taken to the recovery room in good condition.  Juan Colon MD  Date: 10/27/2018 Time: 11:46 AM        Electronically signed by Juan Colon MD at 10/27/2018 11:49 AM          Discharge Summary      Juan Colon MD at 10/31/2018  2:13 PM          [facility]  DISCHARGE SUMMARY       Date of Admission: 10/27/2018  Date of Discharge:  10/31/2018  Primary Care Physician: Mg Colmenares MD    Presenting Problem/History of Present Illness:  Hip fracture (CMS/[facility]) [S72.009A]     Final Discharge Diagnoses:  Active Hospital Problems    Diagnosis   • Closed displaced intertrochanteric fracture of left femur (CMS/[facility])       Consults: None    Procedures Performed: Trochanteric fixation nailing left hip fracture    Pertinent Test Results: Hemoglobin stable    History of Present Illness on Day of Discharge: Stable on discharge     Hospital Course:  The patient is a 88 y.o. male who presented to [facility] with a comminuted intertrochanteric fracture of the left hip up.  He was taken to surgery and a trochanteric fixation nailing was carried out.  Postoperatively his course has been somewhat slow due to his age but he has progressed  "satisfactorily.  He refuses to put any weight on the extremity.  Currently is discharged back to a nursing facility in stable condition.        /55 (BP Location: Right arm, Patient Position: Lying)   Pulse 63   Temp 98.1 °F (36.7 °C)   Resp 16   Ht 177.8 cm (70\")   Wt 70.1 kg (154 lb 9.6 oz)   SpO2 93%   BMI 22.18 kg/m²        Discharge Medications:     Discharge Medications      ASK your doctor about these medications      Instructions Start Date   bisacodyl 5 MG EC tablet  Commonly known as:  DULCOLAX   10 mg, Oral, Daily PRN      carbamide peroxide 6.5 % otic solution  Commonly known as:  DEBROX   10 drops, Both Ears, Every Night at Bedtime      hydrochlorothiazide 25 MG tablet  Commonly known as:  HYDRODIURIL   25 mg, Oral, Daily      magnesium hydroxide 400 MG/5ML suspension  Commonly known as:  MILK OF MAGNESIA   5 mL, Oral, Daily PRN      polyethylene glycol packet  Commonly known as:  MIRALAX   17 g, Oral, Daily      QUEtiapine 50 MG tablet  Commonly known as:  SEROquel   50 mg, Oral, Nightly      vitamin B-12 1000 MCG tablet  Commonly known as:  CYANOCOBALAMIN   1,000 mcg, Oral, Every 14 Days, Fridays      vitamin D 75969 units capsule capsule  Commonly known as:  ERGOCALCIFEROL   50,000 Units, Oral, Weekly, On Fridays             Discharge Diet:  regular    Discharge Care Plan/Instructions: #1 he needs physical therapy weight as tolerated #2 staples are to be removed in 1-1/2 weeks #3 is to follow-up in my office in 3 weeks' number for there to call for any problems    Follow-up Appointments:   Thursday, November 22      Juan Colon MD  10/31/18  2:13 PM        Electronically signed by Juan Colon MD at 10/31/2018  2:16 PM       "

## 2018-11-01 NOTE — THERAPY DISCHARGE NOTE
Acute Care - Physical Therapy Discharge Summary  [facility]       Patient Name: Burke Reinoso Jr.  : 1930  MRN: [id number]    Today's Date: 2018  Onset of Illness/Injury or Date of Surgery: 10/27/18    Date of Referral to PT: 10/27/18  Referring Physician: Dr. Juan Colon      Admit Date: 10/27/2018      PT Recommendation and Plan    Visit Dx:    ICD-10-CM ICD-9-CM   1. Closed displaced intertrochanteric fracture of left femur with routine healing, subsequent encounter S72.142D V54.13   2. Impaired functional mobility and activity tolerance Z74.09 V49.89   3. Anticoagulation goal of INR 1.5 to 2.5 Z51.81 V58.83    Z79.01 V58.61             Outcome Measures     Row Name 10/31/18 1100             How much help from another person do you currently need...    Turning from your back to your side while in flat bed without using bedrails? 3  -MIKY      Moving from lying on back to sitting on the side of a flat bed without bedrails? 2  -MIKY      Moving to and from a bed to a chair (including a wheelchair)? 2  -MIKY      Standing up from a chair using your arms (e.g., wheelchair, bedside chair)? 2  -MIKY      Climbing 3-5 steps with a railing? 1  -MIKY      To walk in hospital room? 2  -MIKY      AM-PAC 6 Clicks Score 12  -MIKY        User Key  (r) = Recorded By, (t) = Taken By, (c) = Cosigned By    Initials Name Provider Type    Peggy Lei, PTA Physical Therapy Assistant            Therapy Suggested Charges     Code   Minutes Charges    29950 (CPT®) Hc Pt Neuromusc Re Education Ea 15 Min      54499 (CPT®) Hc Pt Ther Proc Ea 15 Min 14 1    03108 (CPT®) Hc Gait Training Ea 15 Min 12 1    16280 (CPT®) Hc Pt Therapeutic Act Ea 15 Min      93816 (CPT®) Hc Pt Manual Therapy Ea 15 Min      68236 (CPT®) Hc Pt Iontophoresis Ea 15 Min      19471 (CPT®) Hc Pt Elec Stim Ea-Per 15 Min      74404 (CPT®) Hc Pt Ultrasound Ea 15 Min      50244 (CPT®) Hc Pt Self Care/Mgmt/Train Ea 15 Min      49116 (CPT®) Hc Pt Prosthetic (S)  Train Initial Encounter, Each 15 Min      88028 (CPT®) Hc Pt Orthotic(S)/Prosthetic(S) Encounter, Each 15 Min      13915 (CPT®) Hc Orthotic(S) Mgmt/Train Initial Encounter, Each 15min      Total  26 2                PT Rehab Goals     Row Name 11/01/18 0823             Bed Mobility Goal 1 (PT)    Activity/Assistive Device (Bed Mobility Goal 1, PT) bed mobility activities, all  -CW      Wood Level/Cues Needed (Bed Mobility Goal 1, PT) supervision required  -CW      Time Frame (Bed Mobility Goal 1, PT) by discharge  -CW      Progress/Outcomes (Bed Mobility Goal 1, PT) goal not met  -CW         Transfer Goal 1 (PT)    Activity/Assistive Device (Transfer Goal 1, PT) sit-to-stand/stand-to-sit;bed-to-chair/chair-to-bed  -CW      Wood Level/Cues Needed (Transfer Goal 1, PT) minimum assist (75% or more patient effort)  -CW      Time Frame (Transfer Goal 1, PT) by discharge  -CW      Progress/Outcome (Transfer Goal 1, PT) goal not met  -CW         Gait Training Goal 1 (PT)    Activity/Assistive Device (Gait Training Goal 1, PT) gait (walking locomotion)  -CW      Wood Level (Gait Training Goal 1, PT) minimum assist (75% or more patient effort)  -CW      Distance (Gait Goal 1, PT) 50  -CW      Time Frame (Gait Training Goal 1, PT) by discharge  -CW      Progress/Outcome (Gait Training Goal 1, PT) goal not met  -CW        User Key  (r) = Recorded By, (t) = Taken By, (c) = Cosigned By    Initials Name Provider Type Discipline    Mary Escobar PTA Physical Therapy Assistant PT              PT Discharge Summary  Reason for Discharge: Discharge from facility  Outcomes Achieved: Refer to plan of care for updates on goals achieved  Discharge Destination: [facility]      Mary Sylvester PTA   11/1/2018

## 2018-11-02 ENCOUNTER — APPOINTMENT (OUTPATIENT)
Dept: GENERAL RADIOLOGY | Facility: HOSPITAL | Age: 83
End: 2018-11-02

## 2018-11-02 PROBLEM — S72.352A CLOSED DISPLACED COMMINUTED FRACTURE OF SHAFT OF LEFT FEMUR (HCC): Status: ACTIVE | Noted: 2018-11-02

## 2018-11-02 LAB
ABO GROUP BLD: NORMAL
ANION GAP SERPL CALCULATED.3IONS-SCNC: 11 MMOL/L (ref 4–13)
BASOPHILS # BLD AUTO: 0.03 10*3/MM3 (ref 0–0.2)
BASOPHILS NFR BLD AUTO: 0.2 % (ref 0–2)
BILIRUB UR QL STRIP: NEGATIVE
BLD GP AB SCN SERPL QL: NEGATIVE
BUN BLD-MCNC: 25 MG/DL (ref 5–21)
BUN/CREAT SERPL: 30.9 (ref 7–25)
CALCIUM SPEC-SCNC: 8.5 MG/DL (ref 8.4–10.4)
CHLORIDE SERPL-SCNC: 94 MMOL/L (ref 98–110)
CLARITY UR: CLEAR
CO2 SERPL-SCNC: 30 MMOL/L (ref 24–31)
COLOR UR: ABNORMAL
CREAT BLD-MCNC: 0.81 MG/DL (ref 0.5–1.4)
DEPRECATED RDW RBC AUTO: 46.7 FL (ref 40–54)
EOSINOPHIL # BLD AUTO: 0 10*3/MM3 (ref 0–0.7)
EOSINOPHIL NFR BLD AUTO: 0 % (ref 0–4)
ERYTHROCYTE [DISTWIDTH] IN BLOOD BY AUTOMATED COUNT: 14.6 % (ref 12–15)
GFR SERPL CREATININE-BSD FRML MDRD: 90 ML/MIN/1.73
GLUCOSE BLD-MCNC: 110 MG/DL (ref 70–100)
GLUCOSE UR STRIP-MCNC: NEGATIVE MG/DL
HCT VFR BLD AUTO: 25.4 % (ref 40–52)
HCT VFR BLD AUTO: 26.1 % (ref 40–52)
HGB BLD-MCNC: 8.2 G/DL (ref 14–18)
HGB BLD-MCNC: 8.7 G/DL (ref 14–18)
HGB UR QL STRIP.AUTO: NEGATIVE
HOLD SPECIMEN: NORMAL
IMM GRANULOCYTES # BLD: 0.1 10*3/MM3 (ref 0–0.03)
IMM GRANULOCYTES NFR BLD: 0.7 % (ref 0–5)
INR PPP: 1.46 (ref 0.91–1.09)
KETONES UR QL STRIP: NEGATIVE
LEUKOCYTE ESTERASE UR QL STRIP.AUTO: NEGATIVE
LYMPHOCYTES # BLD AUTO: 0.46 10*3/MM3 (ref 0.72–4.86)
LYMPHOCYTES NFR BLD AUTO: 3.2 % (ref 15–45)
MAGNESIUM SERPL-MCNC: 2 MG/DL (ref 1.4–2.2)
MCH RBC QN AUTO: 29.5 PG (ref 28–32)
MCHC RBC AUTO-ENTMCNC: 33.3 G/DL (ref 33–36)
MCV RBC AUTO: 88.5 FL (ref 82–95)
MONOCYTES # BLD AUTO: 0.62 10*3/MM3 (ref 0.19–1.3)
MONOCYTES NFR BLD AUTO: 4.3 % (ref 4–12)
NEUTROPHILS # BLD AUTO: 13.05 10*3/MM3 (ref 1.87–8.4)
NEUTROPHILS NFR BLD AUTO: 91.6 % (ref 39–78)
NITRITE UR QL STRIP: NEGATIVE
NRBC BLD MANUAL-RTO: 0 /100 WBC (ref 0–0)
PH UR STRIP.AUTO: 6.5 [PH] (ref 5–8)
PLATELET # BLD AUTO: 330 10*3/MM3 (ref 130–400)
PMV BLD AUTO: 10.5 FL (ref 6–12)
POTASSIUM BLD-SCNC: 4.4 MMOL/L (ref 3.5–5.3)
PROT UR QL STRIP: NEGATIVE
PROTHROMBIN TIME: 18.2 SECONDS (ref 11.9–14.6)
RBC # BLD AUTO: 2.95 10*6/MM3 (ref 4.8–5.9)
RH BLD: POSITIVE
SODIUM BLD-SCNC: 135 MMOL/L (ref 135–145)
SP GR UR STRIP: 1.02 (ref 1–1.03)
T&S EXPIRATION DATE: NORMAL
UROBILINOGEN UR QL STRIP: ABNORMAL
WBC NRBC COR # BLD: 14.26 10*3/MM3 (ref 4.8–10.8)

## 2018-11-02 PROCEDURE — 94799 UNLISTED PULMONARY SVC/PX: CPT

## 2018-11-02 PROCEDURE — 36415 COLL VENOUS BLD VENIPUNCTURE: CPT | Performed by: FAMILY MEDICINE

## 2018-11-02 PROCEDURE — 86923 COMPATIBILITY TEST ELECTRIC: CPT

## 2018-11-02 PROCEDURE — 30233N1 TRANSFUSION OF NONAUTOLOGOUS RED BLOOD CELLS INTO PERIPHERAL VEIN, PERCUTANEOUS APPROACH: ICD-10-PCS | Performed by: ORTHOPAEDIC SURGERY

## 2018-11-02 PROCEDURE — 25010000002 MORPHINE SULFATE (PF) 2 MG/ML SOLUTION: Performed by: FAMILY MEDICINE

## 2018-11-02 PROCEDURE — 85014 HEMATOCRIT: CPT | Performed by: FAMILY MEDICINE

## 2018-11-02 PROCEDURE — 93010 ELECTROCARDIOGRAM REPORT: CPT | Performed by: INTERNAL MEDICINE

## 2018-11-02 PROCEDURE — 86900 BLOOD TYPING SEROLOGIC ABO: CPT | Performed by: EMERGENCY MEDICINE

## 2018-11-02 PROCEDURE — 86901 BLOOD TYPING SEROLOGIC RH(D): CPT | Performed by: EMERGENCY MEDICINE

## 2018-11-02 PROCEDURE — 85610 PROTHROMBIN TIME: CPT | Performed by: FAMILY MEDICINE

## 2018-11-02 PROCEDURE — P9016 RBC LEUKOCYTES REDUCED: HCPCS

## 2018-11-02 PROCEDURE — 86850 RBC ANTIBODY SCREEN: CPT | Performed by: EMERGENCY MEDICINE

## 2018-11-02 PROCEDURE — 83735 ASSAY OF MAGNESIUM: CPT | Performed by: FAMILY MEDICINE

## 2018-11-02 PROCEDURE — 25010000002 VITAMIN K1 PER 1 MG: Performed by: ORTHOPAEDIC SURGERY

## 2018-11-02 PROCEDURE — 36430 TRANSFUSION BLD/BLD COMPNT: CPT

## 2018-11-02 PROCEDURE — 85018 HEMOGLOBIN: CPT | Performed by: FAMILY MEDICINE

## 2018-11-02 PROCEDURE — 74018 RADEX ABDOMEN 1 VIEW: CPT

## 2018-11-02 PROCEDURE — 30233K1 TRANSFUSION OF NONAUTOLOGOUS FROZEN PLASMA INTO PERIPHERAL VEIN, PERCUTANEOUS APPROACH: ICD-10-PCS | Performed by: ORTHOPAEDIC SURGERY

## 2018-11-02 PROCEDURE — 86900 BLOOD TYPING SEROLOGIC ABO: CPT

## 2018-11-02 PROCEDURE — 81003 URINALYSIS AUTO W/O SCOPE: CPT | Performed by: FAMILY MEDICINE

## 2018-11-02 PROCEDURE — 93005 ELECTROCARDIOGRAM TRACING: CPT | Performed by: EMERGENCY MEDICINE

## 2018-11-02 PROCEDURE — 80048 BASIC METABOLIC PNL TOTAL CA: CPT | Performed by: FAMILY MEDICINE

## 2018-11-02 PROCEDURE — 71045 X-RAY EXAM CHEST 1 VIEW: CPT

## 2018-11-02 RX ORDER — HYDROCODONE BITARTRATE AND ACETAMINOPHEN 5; 325 MG/1; MG/1
1 TABLET ORAL EVERY 4 HOURS PRN
Status: DISPENSED | OUTPATIENT
Start: 2018-11-02 | End: 2018-11-12

## 2018-11-02 RX ORDER — SODIUM CHLORIDE 0.9 % (FLUSH) 0.9 %
3 SYRINGE (ML) INJECTION EVERY 12 HOURS SCHEDULED
Status: DISCONTINUED | OUTPATIENT
Start: 2018-11-02 | End: 2018-11-13 | Stop reason: HOSPADM

## 2018-11-02 RX ORDER — NALOXONE HCL 0.4 MG/ML
0.4 VIAL (ML) INJECTION
Status: DISCONTINUED | OUTPATIENT
Start: 2018-11-02 | End: 2018-11-07

## 2018-11-02 RX ORDER — BISACODYL 10 MG
10 SUPPOSITORY, RECTAL RECTAL DAILY
Status: DISCONTINUED | OUTPATIENT
Start: 2018-11-02 | End: 2018-11-13 | Stop reason: HOSPADM

## 2018-11-02 RX ORDER — BISACODYL 5 MG/1
10 TABLET, DELAYED RELEASE ORAL DAILY PRN
Status: DISCONTINUED | OUTPATIENT
Start: 2018-11-02 | End: 2018-11-13 | Stop reason: HOSPADM

## 2018-11-02 RX ORDER — PHYTONADIONE 10 MG/ML
10 INJECTION, EMULSION INTRAMUSCULAR; INTRAVENOUS; SUBCUTANEOUS ONCE
Status: COMPLETED | OUTPATIENT
Start: 2018-11-02 | End: 2018-11-02

## 2018-11-02 RX ORDER — QUETIAPINE FUMARATE 25 MG/1
50 TABLET, FILM COATED ORAL NIGHTLY
Status: DISCONTINUED | OUTPATIENT
Start: 2018-11-02 | End: 2018-11-13 | Stop reason: HOSPADM

## 2018-11-02 RX ORDER — POLYETHYLENE GLYCOL 3350 17 G/17G
17 POWDER, FOR SOLUTION ORAL DAILY
Status: DISCONTINUED | OUTPATIENT
Start: 2018-11-03 | End: 2018-11-13 | Stop reason: HOSPADM

## 2018-11-02 RX ORDER — FAMOTIDINE 10 MG/ML
20 INJECTION, SOLUTION INTRAVENOUS DAILY
Status: DISCONTINUED | OUTPATIENT
Start: 2018-11-02 | End: 2018-11-05 | Stop reason: ALTCHOICE

## 2018-11-02 RX ORDER — SODIUM CHLORIDE 0.9 % (FLUSH) 0.9 %
3-10 SYRINGE (ML) INJECTION AS NEEDED
Status: DISCONTINUED | OUTPATIENT
Start: 2018-11-02 | End: 2018-11-13 | Stop reason: HOSPADM

## 2018-11-02 RX ORDER — HYDROCHLOROTHIAZIDE 25 MG/1
25 TABLET ORAL DAILY
Status: DISCONTINUED | OUTPATIENT
Start: 2018-11-03 | End: 2018-11-13 | Stop reason: HOSPADM

## 2018-11-02 RX ORDER — MORPHINE SULFATE 2 MG/ML
2 INJECTION, SOLUTION INTRAMUSCULAR; INTRAVENOUS EVERY 4 HOURS PRN
Status: DISCONTINUED | OUTPATIENT
Start: 2018-11-02 | End: 2018-11-05

## 2018-11-02 RX ORDER — SODIUM CHLORIDE 9 MG/ML
50 INJECTION, SOLUTION INTRAVENOUS CONTINUOUS
Status: DISCONTINUED | OUTPATIENT
Start: 2018-11-02 | End: 2018-11-04

## 2018-11-02 RX ADMIN — Medication 3 ML: at 10:46

## 2018-11-02 RX ADMIN — Medication 3 ML: at 20:01

## 2018-11-02 RX ADMIN — QUETIAPINE FUMARATE 50 MG: 25 TABLET, FILM COATED ORAL at 20:01

## 2018-11-02 RX ADMIN — FAMOTIDINE 20 MG: 10 INJECTION INTRAVENOUS at 10:46

## 2018-11-02 RX ADMIN — MORPHINE SULFATE 2 MG: 2 INJECTION, SOLUTION INTRAMUSCULAR; INTRAVENOUS at 02:37

## 2018-11-02 RX ADMIN — HYDROCODONE BITARTRATE AND ACETAMINOPHEN 1 TABLET: 5; 325 TABLET ORAL at 17:05

## 2018-11-02 RX ADMIN — MORPHINE SULFATE 2 MG: 2 INJECTION, SOLUTION INTRAMUSCULAR; INTRAVENOUS at 06:42

## 2018-11-02 RX ADMIN — CARBAMIDE PEROXIDE 6.5% 10 DROP: 6.5 LIQUID AURICULAR (OTIC) at 20:01

## 2018-11-02 RX ADMIN — MORPHINE SULFATE 2 MG: 2 INJECTION, SOLUTION INTRAMUSCULAR; INTRAVENOUS at 16:06

## 2018-11-02 RX ADMIN — MORPHINE SULFATE 2 MG: 2 INJECTION, SOLUTION INTRAMUSCULAR; INTRAVENOUS at 22:37

## 2018-11-02 RX ADMIN — SODIUM CHLORIDE 100 ML/HR: 9 INJECTION, SOLUTION INTRAVENOUS at 04:37

## 2018-11-02 RX ADMIN — BISACODYL 10 MG: 10 SUPPOSITORY RECTAL at 02:59

## 2018-11-02 RX ADMIN — MORPHINE SULFATE 2 MG: 2 INJECTION, SOLUTION INTRAMUSCULAR; INTRAVENOUS at 10:45

## 2018-11-02 RX ADMIN — Medication 3 ML: at 03:00

## 2018-11-02 RX ADMIN — PHYTONADIONE 10 MG: 10 INJECTION, EMULSION INTRAMUSCULAR; INTRAVENOUS; SUBCUTANEOUS at 15:26

## 2018-11-02 NOTE — PLAN OF CARE
Problem: Patient Care Overview  Goal: Plan of Care Review  Outcome: Ongoing (interventions implemented as appropriate)   11/02/18 1805   Coping/Psychosocial   Plan of Care Reviewed With patient;family   Plan of Care Review   Progress no change   OTHER   Outcome Summary At time of visit, pts daughter present in room. Daughter reports pt to be hungry and usually has a good appetite. Reports visible weight loss and states his weight prior to NH admission was 172lbs. Ordered Boost daily with lunch. Nutrition Focused Physical Exam completed, MSA submitted to MD. Will continue to follow.        Problem: Nutrition, Imbalanced: Inadequate Oral Intake (Adult)  Goal: Identify Related Risk Factors and Signs and Symptoms  Outcome: Ongoing (interventions implemented as appropriate)   11/02/18 1805   Nutrition, Imbalanced: Inadequate Oral Intake (Adult)   Related Risk Factors (Nutrition Imbalance, Inadequate Oral Intake) chronic illness/infection   Signs and Symptoms (Nutrition Imbalance, Inadequate Oral Intake: Signs and Symptoms) loss of subcutaneous fat;muscle mass/strength decreased;weight decreased (percent weight loss, percent usual body weight, body mass index less than 18.5) (Adults)

## 2018-11-02 NOTE — CONSULTS
Consult  [facility]      Burke Reinoso JrAna (2/2/1930)  11/2/2018    Reason for Consult: Left hip pain  Requesting Physician: Mario Valdez DO      CHIEF COMPLAINT:  Left hip pain    History Obtained From: chart family     HISTORY OF PRESENT ILLNESS:                The patient is a 88 y.o. male who presents with above chief complaint. The patient had a fall at [facility] today and has had deformity to his left leg and pain since that time.  He has a recent history of a left intertrochanteric hip fracture status post a left short trochanteric fixation nailing on 10/27/2018.  The patient was discharged from the hospital on 11/02/2018.  The patient has confusion, no changes noted.      Orthopedics was consulted on this patient.    Past Medical History:    Past Medical History:   Diagnosis Date   • Arthritis    • Dementia    • GERD (gastroesophageal reflux disease)    • Hip fracture (CMS/[facility])    • Hypertension        Past Surgical History:    Past Surgical History:   Procedure Laterality Date   • HIP TROCANTERIC NAILING WITH INTRAMEDULLARY HIP SCREW Left 10/27/2018    Procedure: HIP TROCANTERIC NAILING SHORT WITH INTRAMEDULLARY HIP SCREW;  Surgeon: Juan Colon MD;  Location: [facility];  Service: Orthopedics   • NO PAST SURGERIES N/A 10/27/2018    info from pt's daughter       Current Medications:     Current Facility-Administered Medications:   •  bisacodyl (DULCOLAX) EC tablet 10 mg, 10 mg, Oral, Daily PRN, Jacek Kinsey APRN  •  bisacodyl (DULCOLAX) suppository 10 mg, 10 mg, Rectal, Daily, Mario Valdez DO, 10 mg at 11/02/18 0259  •  carbamide peroxide (DEBROX) 6.5 % otic solution 10 drop, 10 drop, Both Ears, Nightly, Jacek Kinsey APRN  •  famotidine (PEPCID) injection 20 mg, 20 mg, Intravenous, Daily, Mario Valdez DO, 20 mg at 11/02/18 1046  •  [START ON 11/3/2018] hydrochlorothiazide (HYDRODIURIL) tablet 25 mg, 25  mg, Oral, Daily, Jacek Kinsey APRN  •  HYDROcodone-acetaminophen (NORCO) 5-325 MG per tablet 1 tablet, 1 tablet, Oral, Q4H PRN, Juan Colon MD, 1 tablet at 11/02/18 1705  •  Morphine sulfate (PF) injection 2 mg, 2 mg, Intravenous, Q4H PRN, 2 mg at 11/02/18 1606 **AND** naloxone (NARCAN) injection 0.4 mg, 0.4 mg, Intravenous, Q5 Min PRN, Mario Valdez DO  •  [START ON 11/3/2018] polyethylene glycol (MIRALAX) powder 17 g, 17 g, Oral, Daily, Jacek Kinsey APRN  •  QUEtiapine (SEROquel) tablet 50 mg, 50 mg, Oral, Nightly, Jacek Kinsey APRN  •  [COMPLETED] Insert peripheral IV, , , Once **AND** sodium chloride 0.9 % flush 10 mL, 10 mL, Intravenous, PRN, Alejandro Amaya Jr., MD  •  sodium chloride 0.9 % flush 3 mL, 3 mL, Intravenous, Q12H, Mario Valdez DO, 3 mL at 11/02/18 1046  •  sodium chloride 0.9 % flush 3-10 mL, 3-10 mL, Intravenous, PRN, Mario Valdez DO  •  sodium chloride 0.9 % infusion, 100 mL/hr, Intravenous, Continuous, Mario Valdez DO, Last Rate: 100 mL/hr at 11/02/18 0437, 100 mL/hr at 11/02/18 0437    Allergies:  Patient has no known allergies.    Social History:   Social History     Social History   • Marital status:      Social History Main Topics   • Smoking status: Former Smoker     Years: 20.00     Types: Cigarettes, Pipe   • Smokeless tobacco: Never Used      Comment: hAS  BEEN QUIT FOR 30 = YRS   • Alcohol use No   • Drug use: No   • Sexual activity: Defer     Other Topics Concern   • Not on file       Family History:   History reviewed. No pertinent family history.    REVIEW OF SYSTEMS:    All systems were reviewed and negative except for:  Musculoskeletal: positive for  bone pain, joint pain, joint swelling and See HPI    PHYSICAL EXAM:        General Appearance:    Alert, cooperative, in no acute distress   Head:    Normocephalic, without obvious abnormality, atraumatic   Eyes:            Vision intact, EOM intact     Ears:     Ears appear intact with no abnormalities noted   Neck:   No adenopathy, supple, trachea midline, no thyromegaly   Back:     No kyphosis present, no scoliosis present, no skin lesions,      erythema or scars, no tenderness to palpation,   range of motion normal   Lungs:     Clear, respirations regular, even and unlabored    Heart:    Regular rhythm and normal rate, no edema   Chest Wall:    No abnormalities observed   Abdomen:     Normal bowel sounds, no masses, no organomegaly, soft        non-tender, non-distended, no guarding   Rectal:     Deferred   Extremities:   Moves all extremities well, no edema, no cyanosis, no             redness   Pulses:   Pulses palpable and equal bilaterally   Skin:   No bleeding, bruising or rash   Lymph nodes:   No palpable adenopathy   Neurologic:   Cranial nerves 2 - 12 grossly intact, alert and oriented times 3.         DATA:    Lab Results (last 24 hours)     Procedure Component Value Units Date/Time    Basic Metabolic Panel [[id number]]  (Abnormal) Collected:  11/02/18 0440    Specimen:  Blood Updated:  11/02/18 0551     Glucose 110 (H) mg/dL      BUN 25 (H) mg/dL      Creatinine 0.81 mg/dL      Sodium 135 mmol/L      Potassium 4.4 mmol/L      Chloride 94 (L) mmol/L      CO2 30.0 mmol/L      Calcium 8.5 mg/dL      eGFR Non African Amer 90 mL/min/1.73      BUN/Creatinine Ratio 30.9 (H)     Anion Gap 11.0 mmol/L     Narrative:       The MDRD GFR formula is only valid for adults with stable renal function between ages 18 and 70.    Magnesium [[id number]]  (Normal) Collected:  11/02/18 0440    Specimen:  Blood Updated:  11/02/18 0551     Magnesium 2.0 mg/dL     Protime-INR [[id number]]  (Abnormal) Collected:  11/02/18 0440    Specimen:  Blood Updated:  11/02/18 0543     Protime 18.2 (H) Seconds      INR 1.46 (H)    Hemoglobin & Hematocrit, Blood [[id number]]  (Abnormal) Collected:  11/02/18 0440    Specimen:  Blood Updated:  11/02/18 0538     Hemoglobin 8.2 (L) g/dL      Hematocrit 25.4  (L) %     Urinalysis With Culture If Indicated - Urine, Clean Catch [[id number]]  (Abnormal) Collected:  11/02/18 0240    Specimen:  Urine from Urine, Catheter Updated:  11/02/18 0316     Color, UA Dark Yellow (A)     Appearance, UA Clear     pH, UA 6.5     Specific Gravity, UA 1.020     Glucose, UA Negative     Ketones, UA Negative     Bilirubin, UA Negative     Blood, UA Negative     Protein, UA Negative     Leuk Esterase, UA Negative     Nitrite, UA Negative     Urobilinogen, UA 4.0 E.U./dL (A)    Narrative:       Urine microscopic not indicated.    [place] Draw [[id number]] Collected:  11/01/18 2338    Specimen:  Blood Updated:  11/02/18 0045    Narrative:       The following orders were created for panel order [place] Draw.  Procedure                               Abnormality         Status                     ---------                               -----------         ------                     Light Blue Top[[id number]]                                                              Green Top (Gel)[[id number]]                                                             Lavender Top[[id number]]                                                                Red Top[[id number]]                                          Final result                 Please view results for these tests on the individual orders.    Red Top [[id number]] Collected:  11/01/18 2338    Specimen:  Blood Updated:  11/02/18 0045     Extra Tube Hold for add-ons.     Comment: Auto resulted.       CBC & Differential [[id number]] Collected:  11/01/18 2337    Specimen:  Blood Updated:  11/02/18 0006    Narrative:       The following orders were created for panel order CBC & Differential.  Procedure                               Abnormality         Status                     ---------                               -----------         ------                     CBC Auto Differential[[id number]]        Abnormal            Final result                 Please  view results for these tests on the individual orders.    CBC Auto Differential [[id number]]  (Abnormal) Collected:  11/01/18 2337    Specimen:  Blood Updated:  11/02/18 0006     WBC 14.26 (H) 10*3/mm3      RBC 2.95 (L) 10*6/mm3      Hemoglobin 8.7 (L) g/dL      Hematocrit 26.1 (L) %      MCV 88.5 fL      MCH 29.5 pg      MCHC 33.3 g/dL      RDW 14.6 %      RDW-SD 46.7 fl      MPV 10.5 fL      Platelets 330 10*3/mm3      Neutrophil % 91.6 (H) %      Lymphocyte % 3.2 (L) %      Monocyte % 4.3 %      Eosinophil % 0.0 %      Basophil % 0.2 %      Immature Grans % 0.7 %      Neutrophils, Absolute 13.05 (H) 10*3/mm3      Lymphocytes, Absolute 0.46 (L) 10*3/mm3      Monocytes, Absolute 0.62 10*3/mm3      Eosinophils, Absolute 0.00 10*3/mm3      Basophils, Absolute 0.03 10*3/mm3      Immature Grans, Absolute 0.10 (H) 10*3/mm3      nRBC 0.0 /100 WBC     Comprehensive Metabolic Panel [[id number]]  (Abnormal) Collected:  11/01/18 2337    Specimen:  Blood Updated:  11/01/18 2357     Glucose 129 (H) mg/dL      BUN 26 (H) mg/dL      Creatinine 0.87 mg/dL      Sodium 135 mmol/L      Potassium 4.6 mmol/L      Chloride 94 (L) mmol/L      CO2 31.0 mmol/L      Calcium 8.6 mg/dL      Total Protein 5.9 (L) g/dL      Albumin 3.00 (L) g/dL      ALT (SGPT) 37 U/L      AST (SGOT) 47 (H) U/L      Alkaline Phosphatase 80 U/L      Total Bilirubin 1.2 (H) mg/dL      eGFR Non African Amer 83 mL/min/1.73      Globulin 2.9 gm/dL      A/G Ratio 1.0 (L) g/dL      BUN/Creatinine Ratio 29.9 (H)     Anion Gap 10.0 mmol/L     Narrative:       The MDRD GFR formula is only valid for adults with stable renal function between ages 18 and 70.    Protime-INR [[id number]]  (Abnormal) Collected:  11/01/18 2337    Specimen:  Blood Updated:  11/01/18 8445     Protime 18.2 (H) Seconds      INR 1.46 (H)          Radiology:   Imaging Results (last 7 days)     Procedure Component Value Units Date/Time    XR Abdomen KUB [[id number]] Collected:  11/02/18 0815     Updated:   11/02/18 0822    Narrative:       EXAMINATION: KUB 11/20/2018     HISTORY: Belly pain and leukocytosis     FINDINGS: KUB radiograph demonstrates mild constipation with a  nonspecific bowel gas pattern. There is no obstruction or free air.  There is arthritic change of the right hip and previous fixation for a  left hip fracture. A Singleton catheter is in place.       Impression:       . Mild constipation. There is a nonspecific bowel gas  pattern. There is no obstruction or free air.  This report was finalized on 11/02/2018 08:19 by Dr. Harvey Landry MD.    XR Femur 2 View Left [[id number]] Collected:  11/02/18 0710     Updated:  11/02/18 0718    Narrative:       LEFT FEMUR, 2 VIEWS 11/2/2018 12:12 AM CDT     HISTORY: fall, pain; S72.352A-Displaced comminuted fracture of shaft of  left femur, initial encounter for closed fracture     COMPARISON: Intraoperative fluoroscopic images dated 10/27/2018     FINDINGS:     Frontal and lateral radiographs of the left femur were obtained.     TFN recently placed. New periprosthetic fracture with comminution of the  proximal diaphysis. Large displaced butterfly fragments are present and  there is foreshortening up to 7 cm. Distal femur appears intact.       Impression:       1. New comminuted periprosthetic fracture along the proximal diaphysis  of femur.  This report was finalized on 11/02/2018 07:15 by Dr Baabk Franklin, .    XR Chest 1 View [[id number]] Collected:  11/02/18 0657     Updated:  11/02/18 0701    Narrative:       EXAMINATION:   XR CHEST 1 VW-  11/2/2018 6:57 AM CDT     HISTORY: Preop     Frontal upright radiograph of the chest 11/2/2018 1:31 AM CDT     COMPARISON: October 27, 2018.     FINDINGS:   The lungs are clear. The cardiac silhouette is normal. Vascular  calcifications present aortic arch..      The osseous structures and surrounding soft tissues demonstrate no acute  abnormality.       Impression:       1. No radiographic evidence of acute cardiopulmonary  process.        This report was finalized on 11/02/2018 06:57 by Dr. Donis Andrews MD.          Imaging was brought up and reviewed and I agree with the radiology findings.    IMPRESSION/RECOMMENDATIONS:      Closed displaced comminuted fracture of shaft of left femur (CMS/HCC)      Assessment: Periprosthetic closed displaced comminuted fracture of the shaft of the left femur in a patient with dementia.    Plan:  1) The patient will need re-fixation with a longer nail.  We will proceed with this surgery after the Coumadin has been reversed and the patient is safe for surgery.  The risks and benefits of this procedure were discussed with the patient and his family and they would like to proceed.       Electronically signed by JONES Jimenez5:10 PM11/2/2018

## 2018-11-02 NOTE — PROGRESS NOTES
This patient was only recently discharged from the hospital following fixation of a left intertrochanteric hip fracture with trochanteric fixation nail up.  He went back to a nursing facility and fell.  He presents with a comminuted shaft fracture about the previous nail that is very significant.  This will require re-fixation with a longer nail currently has prothrombin time is 18 and this went to be reversed his he is anemic and he will require a 2 unit transfusion

## 2018-11-02 NOTE — PROGRESS NOTES
Malnutrition Severity Assessment    Patient Name:  Burke Reinoso Jr.  YOB: 1930  MRN: [id number]  Admit Date:  11/1/2018    Patient meets criteria for : Moderate malnutrition    Comments:  If in agreement with malnutrition assessment, please attest documentation. Thanks.     Malnutrition Type: Chronic Illness Malnutrition     Malnutrition Type (last 8 hours)      Malnutrition Severity Assessment     Row Name 11/02/18 1758       Malnutrition Severity Assessment    Malnutrition Type Chronic Illness Malnutrition    Row Name 11/02/18 1758       Physical Signs of Malnutrition (Chronic)    Muscle Wasting Mild   temples c slight depression; clavicle is depressed and protruding; acromion process slightly protruded; mild depression on inner thigh; calves not well developed    Fat Loss Mild   orbital region with slightly dark circles and somewhat hollow appearance; upper arm region with little fat present; ribs apparent and depression between them apparent; illiac crest somewhat prominent    Row Name 11/02/18 1758       Weight Status (Chronic)    %IBW Mild (<90%)    Weight Loss Mild (>5% / 3 mo)    Row Name 11/02/18 1758       Criteria Met (Must meet criteria for severity in at least 2 of these categories: M Wasting, Fat Loss, Fluid, Secondary Signs, Wt. Status, Intake)    Patient meets criteria for  Moderate malnutrition          Electronically signed by:  LIA Triplett RD  11/02/18 6:08 PM

## 2018-11-02 NOTE — PAYOR COMM NOTE
"11/2/18 [facility] [phone]  [phone]    AUTH [id number]            Burke Allen (88 y.o. Male)     Date of Birth Social Security Number Address Home Phone MRN    02/02/1930  [id number]  [place]  [place] [address] [phone] [id number]    Adventism Marital Status          Alevism        Admission Date Admission Type Admitting Provider Attending Provider Department, Room/Bed    11/1/18 Emergency Silvino Markham DO Robinson, Maurice S, DO [facility] 3A, 349/1    Discharge Date Discharge Disposition Discharge Destination                       Attending Provider:  Silvino Markham DO    Allergies:  No Known Allergies    Isolation:  None   Infection:  None   Code Status:  No CPR    Ht:  182.9 cm (72\")   Wt:  72.8 kg (160 lb 6.4 oz)    Admission Cmt:  None   Principal Problem:  None                Active Insurance as of 11/1/2018     Primary Coverage     Payor Plan Insurance Group Employer/Plan Group    ANTHEM MEDICARE REPLACEMENT ANTHEM MEDICARE ADVANTAGE [id number]     Payor Plan Address Payor Plan Phone Number Effective From Effective To    [address] [phone] 1/1/2018     [place] [address]       Subscriber Name Subscriber Birth Date Member ID       BURKE ALLEN JR. 2/2/1930 [id number]           Secondary Coverage     Payor Plan Insurance Group Employer/Plan Group    [place]      Payor Plan Address Payor Plan Phone Number Effective From Effective To    [address] [phone] 10/27/2018     [place] [address]       Subscriber Name Subscriber Birth Date Member ID       BURKE ALLEN JR. 2/2/1930 [id number]                 Emergency Contacts      (Rel.) Home Phone Work Phone Mobile Phone    Holly Jarvis (Daughter) -- -- [phone]        H&P  Date of Service: 11/2/2018 12:56 AM  Mario Valdez,    Medicine   Expand All Collapse All  "   []Manual[]Template  []Copied          [facility] Medicine Admission        Date of Admission: 11/1/2018        Primary Care Physician: Mg Colmenares MD     Chief compliant: acute fall     HPI: This is a 88-year-old white male that has a history physical deconditioning and dementia.  The patient also is status post trochanteric fixation and narrowing of the left hip in the past week.  The patient was a discharge and sent to a nursing home in [facility].  The patient this evening had a fall that was unwitnessed.  The patient was brought in for evaluation.  In the ER the patient was found to have a severe fracture of the left proximal femur near the prosthesis.  Orthopedics was called and the patient will be nothing by mouth for further evaluation.     Past Medical History:   Medical History        Past Medical History:   Diagnosis Date   • Arthritis     • Dementia     • GERD (gastroesophageal reflux disease)     • Hypertension              Past Surgical History:   Surgical History         Past Surgical History:   Procedure Laterality Date   • HIP TROCANTERIC NAILING WITH INTRAMEDULLARY HIP SCREW Left 10/27/2018     Procedure: HIP TROCANTERIC NAILING SHORT WITH INTRAMEDULLARY HIP SCREW;  Surgeon: Juan Colon MD;  Location: [facility];  Service: Orthopedics   • NO PAST SURGERIES N/A 10/27/2018     info from pt's daughter            Family History: History reviewed. No pertinent family history.     Social History:   Social History   Social History           Social History   • Marital status:              Social History Main Topics   • Smoking status: Former Smoker       Years: 20.00       Types: Cigarettes, Pipe   • Smokeless tobacco: Never Used         Comment: hAS  BEEN QUIT FOR 30 = YRS   • Alcohol use No   • Drug use: No   • Sexual activity: Defer           Other Topics Concern   • Not on file            Allergies: No Known Allergies     Medications:           Prior to  Admission medications    Medication Sig Start Date End Date Taking? Authorizing Provider   bisacodyl (DULCOLAX) 5 MG EC tablet Take 10 mg by mouth Daily As Needed for Constipation.       Gary Perez MD   carbamide peroxide (DEBROX) 6.5 % otic solution Administer 10 drops into both ears every night at bedtime.       Gary Perez MD   hydrochlorothiazide (HYDRODIURIL) 25 MG tablet Take 25 mg by mouth Daily.       Gary Perez MD   HYDROcodone-acetaminophen (NORCO) 5-325 MG per tablet Take 1 tablet by mouth Every 6 (Six) Hours As Needed for Moderate Pain  for up to 6 days. 10/31/18 11/6/18   Juan Colon MD   magnesium hydroxide (MILK OF MAGNESIA) 400 MG/5ML suspension Take 5 mL by mouth Daily As Needed for Constipation.       Gary Perez MD   polyethylene glycol (MIRALAX) packet Take 17 g by mouth Daily.       Gary Perez MD   QUEtiapine (SEROquel) 50 MG tablet Take 50 mg by mouth Every Night.       Gary Perez MD   vitamin B-12 (CYANOCOBALAMIN) 1000 MCG tablet Take 1,000 mcg by mouth Every 14 (Fourteen) Days. Fridays       Gary Perez MD   vitamin D (ERGOCALCIFEROL) 31907 units capsule capsule Take 50,000 Units by mouth 1 (One) Time Per Week. On Fridays       Gary Perez MD   warfarin (COUMADIN) 3 MG tablet 3mg po q day 10/31/18     Juan Colon MD         Review of Systems:     Full 12 point review systems is not obtainable/accurate as the patient is underlying dementia.     Physical Exam:     Temp:  [97.1 °F (36.2 °C)] 97.1 °F (36.2 °C)  Heart Rate:  [] 97  Resp:  [16] 16  BP: (122-152)/(49-88) 122/76     General:Patient is uncomfortable, no acute distress.  HEENT: Head: Normocephalic and atraumatic.   Eyes: Conjunctivae and EOM are normal. Pupils are equal, round, and reactive to light. Right eye exhibits no discharge. Left eye exhibits no discharge.   Neck: Normal range of motion. Neck supple. No JVD present. No  tracheal deviation present. No thyromegaly present.   heart: Normal rate, regular rhythm, normal heart sounds and intact distal pulses.  Exam reveals no gallop and no friction rub. No murmur heard.  Pulmonary: Effort normal and breath sounds normal. No stridor. No respiratory distress. He has no wheezes. No rales or tenderness.   Abdominal: Soft, Bowel sounds are normal. No distension and no mass. There is no tenderness. There is no rebound and no guarding. No hernia.   Musculoskeletal: The patient has an externally rotated left foot.  The patient has some swelling at the proximal left femur and hip region.   Lymph: No cervical adenopathy.   Skin: Skin is warm. No rash noted. Patient is not diaphoretic.         Nursing note and vitals reviewed.     Results Reviewed:  I have personally reviewed current lab, radiology, and data and agree with results.          Lab Results (last 24 hours)      Procedure Component Value Units Date/Time     Ewell Draw [[id number]] Collected:  11/01/18 2338     Specimen:  Blood Updated:  11/02/18 0045     Narrative:        The following orders were created for panel order Ewell Draw.  Procedure                               Abnormality         Status                     ---------                               -----------         ------                     Light Blue Top[[id number]]                                                              Green Top (Gel)[[id number]]                                                             Lavender Top[[id number]]                                                                Red Top[[id number]]                                          Final result                  Please view results for these tests on the individual orders.     Red Top [[id number]] Collected:  11/01/18 2338     Specimen:  Blood Updated:  11/02/18 0045       Extra Tube Hold for add-ons.       Comment: Auto resulted.        CBC & Differential [[id number]] Collected:  11/01/18 2337      Specimen:  Blood Updated:  11/02/18 0006     Narrative:        The following orders were created for panel order CBC & Differential.  Procedure                               Abnormality         Status                     ---------                               -----------         ------                     CBC Auto Differential[[id number]]        Abnormal            Final result                  Please view results for these tests on the individual orders.     CBC Auto Differential [[id number]]  (Abnormal) Collected:  11/01/18 2337     Specimen:  Blood Updated:  11/02/18 0006       WBC 14.26 (H) 10*3/mm3         RBC 2.95 (L) 10*6/mm3         Hemoglobin 8.7 (L) g/dL         Hematocrit 26.1 (L) %         MCV 88.5 fL         MCH 29.5 pg         MCHC 33.3 g/dL         RDW 14.6 %         RDW-SD 46.7 fl         MPV 10.5 fL         Platelets 330 10*3/mm3         Neutrophil % 91.6 (H) %         Lymphocyte % 3.2 (L) %         Monocyte % 4.3 %         Eosinophil % 0.0 %         Basophil % 0.2 %         Immature Grans % 0.7 %         Neutrophils, Absolute 13.05 (H) 10*3/mm3         Lymphocytes, Absolute 0.46 (L) 10*3/mm3         Monocytes, Absolute 0.62 10*3/mm3         Eosinophils, Absolute 0.00 10*3/mm3         Basophils, Absolute 0.03 10*3/mm3         Immature Grans, Absolute 0.10 (H) 10*3/mm3         nRBC 0.0 /100 WBC       Comprehensive Metabolic Panel [[id number]]  (Abnormal) Collected:  11/01/18 2337     Specimen:  Blood Updated:  11/01/18 2357       Glucose 129 (H) mg/dL         BUN 26 (H) mg/dL         Creatinine 0.87 mg/dL         Sodium 135 mmol/L         Potassium 4.6 mmol/L         Chloride 94 (L) mmol/L         CO2 31.0 mmol/L         Calcium 8.6 mg/dL         Total Protein 5.9 (L) g/dL         Albumin 3.00 (L) g/dL         ALT (SGPT) 37 U/L         AST (SGOT) 47 (H) U/L         Alkaline Phosphatase 80 U/L         Total Bilirubin 1.2 (H) mg/dL         eGFR Non African Amer 83 mL/min/1.73         Globulin 2.9 gm/dL          A/G Ratio 1.0 (L) g/dL         BUN/Creatinine Ratio 29.9 (H)       Anion Gap 10.0 mmol/L       Narrative:        The MDRD GFR formula is only valid for adults with stable renal function between ages 18 and 70.     Protime-INR [[id number]]  (Abnormal) Collected:  11/01/18 2337     Specimen:  Blood Updated:  11/01/18 2355       Protime 18.2 (H) Seconds         INR 1.46 (H)                  Imaging Results (last 24 hours)      Procedure Component Value Units Date/Time     XR Femur 2 View Left [[id number]] Updated:  11/02/18 0023             Assessment/Plan               Active Hospital Problems     Diagnosis   • Closed displaced comminuted fracture of shaft of left femur (CMS/HCC)         Assessment / Plan:  Acute mechanical fall with fracture of left proximal femur - the patient to be nothing by mouth, will consult orthopedic surgery, will treat pain with morphine.  We'll hold his anticoagulation.  His most recent INR is 1.46.      Anemia Unspecified- is likely from recent surgery from a hip fracture in the last week.  His hemoglobin on was around 9.2.  Is currently at 8.7.  We will continue to monitor.  I will order H&H twice per day to monitor.  We will type and screen and prepare her 1 unit of blood in case he drops below 7 and will transfuse at that point.     Anticoagulated on Coumadin - hold his warfarin in view of need for possible surgical intervention.  His most recent INR 1.46.  We will check daily INR.     Mild protein malnutrition - will consult nutrition.     Suspected reactive leukocytosis - I suspect this is likely secondary to his his acute fall.  We will repeat CBC to monitor.  Also we will order UA and chest x-ray to rule out acute infection.     Ongoing constipation - will obtain abdominal series, will add suppository and monitor.     Hypertension - blood pressure is 126/49.  Patient will be nothing by mouth.  If the patient continues to have an elevation in blood pressures will add additional  when necessary IV antihypertensives with parameters.     Ethics - DNR  DVT Prophylaxis- SCDs.     GI prophylaxis - IV PPI.     EMR Dragon/Transcription disclaimer:   Much of this encounter note is an electronic transcription/translation of spoken language to printed text. The electronic translation of spoken language may permit erroneous, or at times, nonsensical words or phrases to be inadvertently transcribed; Although I have reviewed the note for such errors, some may still exist.             This document has been electronically signed by Mario Valdez DO on November 2, 2018 12:56 AM                                    Progress Notes  Date of Service: 11/2/2018 12:18 PM  Jacek Kinsey APRN   Medicine   Cosigned by: Silvnio Markham DO at 11/2/2018 12:42 PM   Attestation signed by Silvino Markham DO at 11/2/2018 12:42 PM   I personally evaluated and examined the patient in conjunction with ALKA Cutler and agree with the assessment, treatment plan, and disposition of the patient as recorded by her. My history, exam, and further recommendations are:      Plan:  1.  NPO for surgery later today with Dr. Colon  2.  Vitamin K given to reverse Coumadin per Dr. Colon  3.  Transfusing 2 units PRBC  4.  Bowel regimen   5.  CBC and BMP in AM  6.  SCDs for DVT prophylaxis  7.  Nutrition consult  8.  PT/INR in AM  9.  Chest xray normal     Silvino Markham DO  11/02/18  12:42 PM               []Manual[]Template  []Copied       [facility] Medicine Services  INPATIENT PROGRESS NOTE     Patient Name: Burke Reinoso Jr.  Date of Admission: 11/1/2018  Today's Date: 11/02/18  Length of Stay: 0  Primary Care Physician: Mg Colmenares MD     Subjective   Chief Complaint: left hip pain     HPI   The patient is currently laying in bed with family at the bedside.  He was just recently discharged from this facility to [facility] after having a left  intertrochanteric hip fracture with trochanteric fixation nailing.  He apparently suffered a fall yesterday and there was deformity of the left leg noted.  He was brought here for further evaluation.  He now has a comminuted shaft fracture that is very significant.  Dr. Colon has been consulted. The patient is baseline confused.      Review of Systems   Constitutional: Positive for activity change and fatigue. Negative for appetite change, chills and fever.   HENT: Negative for hearing loss, nosebleeds, tinnitus and trouble swallowing.    Eyes: Negative for visual disturbance.   Respiratory: Negative for cough, chest tightness, shortness of breath and wheezing.    Cardiovascular: Negative for chest pain, palpitations and leg swelling.   Gastrointestinal: Negative for abdominal distention, abdominal pain, blood in stool, constipation, diarrhea, nausea and vomiting.   Endocrine: Negative for cold intolerance, heat intolerance, polydipsia, polyphagia and polyuria.   Genitourinary: Negative for decreased urine volume, difficulty urinating, dysuria, flank pain, frequency and hematuria.   Musculoskeletal: Positive for gait problem, joint swelling and myalgias. Negative for arthralgias.   Skin: Negative for rash.   Allergic/Immunologic: Negative for immunocompromised state.   Neurological: Positive for weakness. Negative for dizziness, syncope, light-headedness and headaches.   Hematological: Negative for adenopathy. Does not bruise/bleed easily.   Psychiatric/Behavioral: Positive for confusion. Negative for sleep disturbance. The patient is not nervous/anxious.       All pertinent negatives and positives are as above. All other systems have been reviewed and are negative unless otherwise stated.      Objective    Temp:  [97.1 °F (36.2 °C)-98.3 °F (36.8 °C)] 97.7 °F (36.5 °C)  Heart Rate:  [] 90  Resp:  [14-22] 16  BP: (118-152)/(49-88) 132/63      Physical Exam   Constitutional: He appears well-developed and  well-nourished.   Laying in bed. NAD.   HENT:   Head: Normocephalic and atraumatic.   Eyes: Pupils are equal, round, and reactive to light. Conjunctivae and EOM are normal.   Neck: Neck supple. No JVD present. No thyromegaly present.   Cardiovascular: Normal rate, regular rhythm, normal heart sounds and intact distal pulses.  Exam reveals no gallop and no friction rub.    No murmur heard.  Pulmonary/Chest: Effort normal and breath sounds normal. No respiratory distress. He has no wheezes. He has no rales. He exhibits no tenderness.   Abdominal: Soft. Bowel sounds are normal. He exhibits no distension. There is no tenderness. There is no rebound and no guarding.   Genitourinary:   Genitourinary Comments: Singleton.    Musculoskeletal: Normal range of motion. He exhibits no edema, tenderness or deformity.   Bruising noted to left hip/groin   Lymphadenopathy:     He has no cervical adenopathy.   Neurological: He is alert. He is disoriented.   Skin: Skin is warm and dry. No rash noted.   Psychiatric: He has a normal mood and affect. His behavior is normal. Judgment and thought content normal.   Nursing note and vitals reviewed.     Results Review:  I have reviewed the labs, radiology results, and diagnostic studies.     Laboratory Data:      Results from last 7 days  Lab Units 11/02/18  0440 11/01/18  2337 10/30/18  0543   10/27/18  0641   WBC 10*3/mm3  --  14.26*  --   --  14.88*   HEMOGLOBIN g/dL 8.2* 8.7* 9.2*  < > 10.2*   HEMATOCRIT % 25.4* 26.1* 28.2*  < > 30.3*   PLATELETS 10*3/mm3  --  330  --   --  256   < > = values in this interval not displayed.        Results from last 7 days  Lab Units 11/02/18  0440 11/01/18  2337 10/27/18  0641   SODIUM mmol/L 135 135 135   POTASSIUM mmol/L 4.4 4.6 4.2   CHLORIDE mmol/L 94* 94* 99   CO2 mmol/L 30.0 31.0 28.0   BUN mg/dL 25* 26* 22*   CREATININE mg/dL 0.81 0.87 0.87   CALCIUM mg/dL 8.5 8.6 8.9   BILIRUBIN mg/dL  --  1.2* 0.6   ALK PHOS U/L  --  80 92   ALT (SGPT) U/L  --  37  21   AST (SGOT) U/L  --  47* 16   GLUCOSE mg/dL 110* 129* 121*      Radiology Data:           Imaging Results (last 24 hours)      Procedure Component Value Units Date/Time     XR Abdomen KUB [[id number]] Collected:  11/02/18 0815       Updated:  11/02/18 0822     Narrative:        EXAMINATION: KUB 11/20/2018     HISTORY: Belly pain and leukocytosis     FINDINGS: KUB radiograph demonstrates mild constipation with a  nonspecific bowel gas pattern. There is no obstruction or free air.  There is arthritic change of the right hip and previous fixation for a  left hip fracture. A Singleton catheter is in place.        Impression:        . Mild constipation. There is a nonspecific bowel gas  pattern. There is no obstruction or free air.  This report was finalized on 11/02/2018 08:19 by Dr. Harvey Landry MD.     XR Femur 2 View Left [[id number]] Collected:  11/02/18 0710       Updated:  11/02/18 0718     Narrative:        LEFT FEMUR, 2 VIEWS 11/2/2018 12:12 AM CDT     HISTORY: fall, pain; S72.352A-Displaced comminuted fracture of shaft of  left femur, initial encounter for closed fracture     COMPARISON: Intraoperative fluoroscopic images dated 10/27/2018     FINDINGS:     Frontal and lateral radiographs of the left femur were obtained.     TFN recently placed. New periprosthetic fracture with comminution of the  proximal diaphysis. Large displaced butterfly fragments are present and  there is foreshortening up to 7 cm. Distal femur appears intact.        Impression:        1. New comminuted periprosthetic fracture along the proximal diaphysis  of femur.  This report was finalized on 11/02/2018 07:15 by Dr Babak Franklin, .     XR Chest 1 View [[id number]] Collected:  11/02/18 0657       Updated:  11/02/18 0701     Narrative:        EXAMINATION:   XR CHEST 1 VW-  11/2/2018 6:57 AM CDT     HISTORY: Preop     Frontal upright radiograph of the chest 11/2/2018 1:31 AM CDT     COMPARISON: October 27, 2018.     FINDINGS:   The lungs  are clear. The cardiac silhouette is normal. Vascular  calcifications present aortic arch..      The osseous structures and surrounding soft tissues demonstrate no acute  abnormality.        Impression:        1. No radiographic evidence of acute cardiopulmonary process.        This report was finalized on 11/02/2018 06:57 by Dr. Donis Andrews MD.          I have reviewed the patient's current medications.      Assessment/Plan      Assessment:  1.  Acute traumatic comminuted periprosthetic fracture along the proximal diaphysis of the femur  2.  Frequent falls  3.  Recent left intertrochanteric hip fracture with trochanteric fixation nailing on 10/27/18  4.  Acute blood loss anemia from recent surgery   5.  Acutely anticoagulated with Coumadin for DVT prophylaxis secondary to #3  6.  Constipation, chronic  7.  Mild protein malnutrition   8.  Essential hypertension   9.  Leukocytosis, suspected reactive     Plan:  1.  NPO for surgery later today with Dr. Colon  2.  Vitamin K given to reverse Coumadin per Dr. Colon  3.  Transfusing 2 units PRBC  4.  Bowel regimen   5.  CBC and BMP in AM  6.  SCDs for DVT prophylaxis  7.  Nutrition consult  8.  PT/INR in AM  9.  Chest xray normal     Discharge Planning: I expect the patient to be discharged to [facility] in ? days.     Jacekgeraldine Kinsey, APRN   11/02/18   12:18 PM             ED to Hosp-Admission (Current) on 11/1/2018            Revision History            Detailed Report               Hospital Medications (active)       Dose Frequency Start End    bisacodyl (DULCOLAX) EC tablet 10 mg 10 mg Daily PRN 11/2/2018     Sig - Route: Take 2 tablets by mouth Daily As Needed for Constipation. - Oral    bisacodyl (DULCOLAX) suppository 10 mg 10 mg Daily 11/2/2018     Sig - Route: Insert 1 suppository into the rectum Daily. - Rectal    carbamide peroxide (DEBROX) 6.5 % otic solution 10 drop 10 drop Nightly 11/2/2018     Sig - Route: Administer 10 drops into both  "ears Every Night. - Both Ears    famotidine (PEPCID) injection 20 mg 20 mg Daily 11/2/2018     Sig - Route: Infuse 2 mL into a venous catheter Daily. - Intravenous    hydrochlorothiazide (HYDRODIURIL) tablet 25 mg 25 mg Daily 11/3/2018     Sig - Route: Take 1 tablet by mouth Daily. - Oral    HYDROcodone-acetaminophen (NORCO) 5-325 MG per tablet 1 tablet 1 tablet Every 4 Hours PRN 11/2/2018 11/12/2018    Sig - Route: Take 1 tablet by mouth Every 4 (Four) Hours As Needed for Moderate Pain . - Oral    Morphine sulfate (PF) injection 2 mg 2 mg Every 4 Hours PRN 11/2/2018 11/12/2018    Sig - Route: Infuse 1 mL into a venous catheter Every 4 (Four) Hours As Needed for Severe Pain . - Intravenous    Linked Group 1:  \"And\" Linked Group Details        naloxone (NARCAN) injection 0.4 mg 0.4 mg Every 5 Minutes PRN 11/2/2018     Sig - Route: Infuse 1 mL into a venous catheter Every 5 (Five) Minutes As Needed for Respiratory Depression. - Intravenous    Linked Group 1:  \"And\" Linked Group Details        phytonadione (AQUA-MEPHYTON, VITAMIN K) injection 10 mg 10 mg Once 11/2/2018     Sig - Route: Infuse 1 mL into a venous catheter 1 (One) Time. - Intravenous    polyethylene glycol (MIRALAX) powder 17 g 17 g Daily 11/3/2018     Sig - Route: Take 17 g by mouth Daily. - Oral    QUEtiapine (SEROquel) tablet 50 mg 50 mg Nightly 11/2/2018     Sig - Route: Take 2 tablets by mouth Every Night. - Oral    sodium chloride 0.9 % flush 10 mL 10 mL As Needed 11/1/2018     Sig - Route: Infuse 10 mL into a venous catheter As Needed for Line Care. - Intravenous    Linked Group 2:  \"And\" Linked Group Details        sodium chloride 0.9 % flush 3 mL 3 mL Every 12 Hours Scheduled 11/2/2018     Sig - Route: Infuse 3 mL into a venous catheter Every 12 (Twelve) Hours. - Intravenous    sodium chloride 0.9 % flush 3-10 mL 3-10 mL As Needed 11/2/2018     Sig - Route: Infuse 3-10 mL into a venous catheter As Needed for Line Care. - Intravenous    sodium " chloride 0.9 % infusion 100 mL/hr Continuous 11/2/2018     Sig - Route: Infuse 100 mL/hr into a venous catheter Continuous. - Intravenous

## 2018-11-02 NOTE — PLAN OF CARE
Problem: Patient Care Overview  Goal: Plan of Care Review  Outcome: Ongoing (interventions implemented as appropriate)   11/02/18 2326   OTHER   Outcome Summary Physical therapy will hold at this time and wait until after surgery to see patient. PT signing off, please reconsult when appropriate. Thank you

## 2018-11-02 NOTE — H&P
[facility] Medicine Admission      Date of Admission: 11/1/2018      Primary Care Physician: Mg Colmenares MD    Chief compliant: acute fall    HPI: This is a 88-year-old white male that has a history physical deconditioning and dementia.  The patient also is status post trochanteric fixation and narrowing of the left hip in the past week.  The patient was a discharge and sent to a nursing home in [facility].  The patient this evening had a fall that was unwitnessed.  The patient was brought in for evaluation.  In the ER the patient was found to have a severe fracture of the left proximal femur near the prosthesis.  Orthopedics was called and the patient will be nothing by mouth for further evaluation.    Past Medical History:   Past Medical History:   Diagnosis Date   • Arthritis    • Dementia    • GERD (gastroesophageal reflux disease)    • Hypertension        Past Surgical History:   Past Surgical History:   Procedure Laterality Date   • HIP TROCANTERIC NAILING WITH INTRAMEDULLARY HIP SCREW Left 10/27/2018    Procedure: HIP TROCANTERIC NAILING SHORT WITH INTRAMEDULLARY HIP SCREW;  Surgeon: Juan Colon MD;  Location: [facility];  Service: Orthopedics   • NO PAST SURGERIES N/A 10/27/2018    info from pt's daughter       Family History: History reviewed. No pertinent family history.    Social History:   Social History     Social History   • Marital status:      Social History Main Topics   • Smoking status: Former Smoker     Years: 20.00     Types: Cigarettes, Pipe   • Smokeless tobacco: Never Used      Comment: hAS  BEEN QUIT FOR 30 = YRS   • Alcohol use No   • Drug use: No   • Sexual activity: Defer     Other Topics Concern   • Not on file       Allergies: No Known Allergies    Medications:   Prior to Admission medications    Medication Sig Start Date End Date Taking? Authorizing Provider   bisacodyl (DULCOLAX) 5 MG EC tablet Take 10 mg by mouth Daily As Needed  for Constipation.    Gary Perez MD   carbamide peroxide (DEBROX) 6.5 % otic solution Administer 10 drops into both ears every night at bedtime.    Gary Perez MD   hydrochlorothiazide (HYDRODIURIL) 25 MG tablet Take 25 mg by mouth Daily.    Gary Perez MD   HYDROcodone-acetaminophen (NORCO) 5-325 MG per tablet Take 1 tablet by mouth Every 6 (Six) Hours As Needed for Moderate Pain  for up to 6 days. 10/31/18 11/6/18  Juan Colon MD   magnesium hydroxide (MILK OF MAGNESIA) 400 MG/5ML suspension Take 5 mL by mouth Daily As Needed for Constipation.    Gary Perez MD   polyethylene glycol (MIRALAX) packet Take 17 g by mouth Daily.    Gary Perez MD   QUEtiapine (SEROquel) 50 MG tablet Take 50 mg by mouth Every Night.    Gary Perez MD   vitamin B-12 (CYANOCOBALAMIN) 1000 MCG tablet Take 1,000 mcg by mouth Every 14 (Fourteen) Days. Fridays    Gary Perez MD   vitamin D (ERGOCALCIFEROL) 49645 units capsule capsule Take 50,000 Units by mouth 1 (One) Time Per Week. On Fridays    Gary Perez MD   warfarin (COUMADIN) 3 MG tablet 3mg po q day 10/31/18   Juan Colon MD       Review of Systems:    Full 12 point review systems is not obtainable/accurate as the patient is underlying dementia.    Physical Exam:    Temp:  [97.1 °F (36.2 °C)] 97.1 °F (36.2 °C)  Heart Rate:  [] 97  Resp:  [16] 16  BP: (122-152)/(49-88) 122/76    General:Patient is uncomfortable, no acute distress.  HEENT: Head: Normocephalic and atraumatic.   Eyes: Conjunctivae and EOM are normal. Pupils are equal, round, and reactive to light. Right eye exhibits no discharge. Left eye exhibits no discharge.   Neck: Normal range of motion. Neck supple. No JVD present. No tracheal deviation present. No thyromegaly present.   heart: Normal rate, regular rhythm, normal heart sounds and intact distal pulses.  Exam reveals no gallop and no friction rub. No murmur  heard.  Pulmonary: Effort normal and breath sounds normal. No stridor. No respiratory distress. He has no wheezes. No rales or tenderness.   Abdominal: Soft, Bowel sounds are normal. No distension and no mass. There is no tenderness. There is no rebound and no guarding. No hernia.   Musculoskeletal: The patient has an externally rotated left foot.  The patient has some swelling at the proximal left femur and hip region.   Lymph: No cervical adenopathy.   Skin: Skin is warm. No rash noted. Patient is not diaphoretic.       Nursing note and vitals reviewed.    Results Reviewed:  I have personally reviewed current lab, radiology, and data and agree with results.  Lab Results (last 24 hours)     Procedure Component Value Units Date/Time    [place] Draw [[id number]] Collected:  11/01/18 2338    Specimen:  Blood Updated:  11/02/18 0045    Narrative:       The following orders were created for panel order [place] Draw.  Procedure                               Abnormality         Status                     ---------                               -----------         ------                     Light Blue Top[[id number]]                                                              Green Top (Gel)[[id number]]                                                             Lavender Top[[id number]]                                                                Red Top[[id number]]                                          Final result                 Please view results for these tests on the individual orders.    Red Top [[id number]] Collected:  11/01/18 2338    Specimen:  Blood Updated:  11/02/18 0045     Extra Tube Hold for add-ons.     Comment: Auto resulted.       CBC & Differential [[id number]] Collected:  11/01/18 2337    Specimen:  Blood Updated:  11/02/18 0006    Narrative:       The following orders were created for panel order CBC & Differential.  Procedure                               Abnormality         Status                      ---------                               -----------         ------                     CBC Auto Differential[[id number]]        Abnormal            Final result                 Please view results for these tests on the individual orders.    CBC Auto Differential [[id number]]  (Abnormal) Collected:  11/01/18 2337    Specimen:  Blood Updated:  11/02/18 0006     WBC 14.26 (H) 10*3/mm3      RBC 2.95 (L) 10*6/mm3      Hemoglobin 8.7 (L) g/dL      Hematocrit 26.1 (L) %      MCV 88.5 fL      MCH 29.5 pg      MCHC 33.3 g/dL      RDW 14.6 %      RDW-SD 46.7 fl      MPV 10.5 fL      Platelets 330 10*3/mm3      Neutrophil % 91.6 (H) %      Lymphocyte % 3.2 (L) %      Monocyte % 4.3 %      Eosinophil % 0.0 %      Basophil % 0.2 %      Immature Grans % 0.7 %      Neutrophils, Absolute 13.05 (H) 10*3/mm3      Lymphocytes, Absolute 0.46 (L) 10*3/mm3      Monocytes, Absolute 0.62 10*3/mm3      Eosinophils, Absolute 0.00 10*3/mm3      Basophils, Absolute 0.03 10*3/mm3      Immature Grans, Absolute 0.10 (H) 10*3/mm3      nRBC 0.0 /100 WBC     Comprehensive Metabolic Panel [[id number]]  (Abnormal) Collected:  11/01/18 2337    Specimen:  Blood Updated:  11/01/18 2357     Glucose 129 (H) mg/dL      BUN 26 (H) mg/dL      Creatinine 0.87 mg/dL      Sodium 135 mmol/L      Potassium 4.6 mmol/L      Chloride 94 (L) mmol/L      CO2 31.0 mmol/L      Calcium 8.6 mg/dL      Total Protein 5.9 (L) g/dL      Albumin 3.00 (L) g/dL      ALT (SGPT) 37 U/L      AST (SGOT) 47 (H) U/L      Alkaline Phosphatase 80 U/L      Total Bilirubin 1.2 (H) mg/dL      eGFR Non African Amer 83 mL/min/1.73      Globulin 2.9 gm/dL      A/G Ratio 1.0 (L) g/dL      BUN/Creatinine Ratio 29.9 (H)     Anion Gap 10.0 mmol/L     Narrative:       The MDRD GFR formula is only valid for adults with stable renal function between ages 18 and 70.    Protime-INR [[id number]]  (Abnormal) Collected:  11/01/18 2337    Specimen:  Blood Updated:  11/01/18 8453     Protime 18.2 (H) Seconds       INR 1.46 (H)        Imaging Results (last 24 hours)     Procedure Component Value Units Date/Time    XR Femur 2 View Left [[id number]] Updated:  11/02/18 0023          Assessment/Plan         Active Hospital Problems    Diagnosis   • Closed displaced comminuted fracture of shaft of left femur (CMS/HCC)       Assessment / Plan:  Acute mechanical fall with fracture of left proximal femur - the patient to be nothing by mouth, will consult orthopedic surgery, will treat pain with morphine.  We'll hold his anticoagulation.  His most recent INR is 1.46.     Anemia Unspecified- is likely from recent surgery from a hip fracture in the last week.  His hemoglobin on was around 9.2.  Is currently at 8.7.  We will continue to monitor.  I will order H&H twice per day to monitor.  We will type and screen and prepare her 1 unit of blood in case he drops below 7 and will transfuse at that point.    Anticoagulated on Coumadin - hold his warfarin in view of need for possible surgical intervention.  His most recent INR 1.46.  We will check daily INR.    Mild protein malnutrition - will consult nutrition.    Suspected reactive leukocytosis - I suspect this is likely secondary to his his acute fall.  We will repeat CBC to monitor.  Also we will order UA and chest x-ray to rule out acute infection.    Ongoing constipation - will obtain abdominal series, will add suppository and monitor.    Hypertension - blood pressure is 126/49.  Patient will be nothing by mouth.  If the patient continues to have an elevation in blood pressures will add additional when necessary IV antihypertensives with parameters.    Ethics - DNR  DVT Prophylaxis- SCDs.    GI prophylaxis - IV PPI.    EMR Dragon/Transcription disclaimer:   Much of this encounter note is an electronic transcription/translation of spoken language to printed text. The electronic translation of spoken language may permit erroneous, or at times, nonsensical words or phrases to be  inadvertently transcribed; Although I have reviewed the note for such errors, some may still exist.            This document has been electronically signed by Mario Valdez DO on November 2, 2018 12:56 AM

## 2018-11-02 NOTE — PROGRESS NOTES
Discharge Planning Assessment  [facility]     Patient Name: Burke Reinoso Jr.  MRN: [id number]  Today's Date: 11/2/2018    Admit Date: 11/1/2018          Discharge Needs Assessment     Row Name 11/02/18 1137       Living Environment    Lives With facility resident    Name(s) of Who Lives With Patient [facility]    Current Living Arrangements extended care facility    Primary Care Provided by other (see comments)    Provides Primary Care For no one    Family Caregiver if Needed child(di), adult;other (see comments)    Family Caregiver Names GRISEL (DTR)    Quality of Family Relationships involved;helpful    Able to Return to Prior Arrangements other (see comments)    Living Arrangement Comments PT WILL RETURN TO [facility] AT ME       Resource/Environmental Concerns    Resource/Environmental Concerns none    Transportation Concerns car, none       Transition Planning    Patient/Family Anticipates Transition to long term care facility    Patient/Family Anticipated Services at Transition skilled nursing    Transportation Anticipated health plan transportation       Discharge Needs Assessment    Readmission Within the Last 30 Days current reason for admission unrelated to previous admission    Concerns to be Addressed adjustment to diagnosis/illness    Equipment Currently Used at Home other (see comments)   [facility] PROVIDES DME    Anticipated Changes Related to Illness inability to care for self    Outpatient/Agency/Support Group Needs skilled nursing facility    Discharge Facility/Level of Care Needs nursing facility, skilled    Discharge Coordination/Progress PT IS FROM [place] WITH A BED HOLD PER [facility] ([phone]). NOTED SURGERY TODAY. WILL FOLLOW POST OP            Discharge Plan     Row Name 11/02/18 7385       Plan    Plan RETURN TO [facility]        Destination     No service coordination in this encounter.      Durable Medical Equipment     No service coordination in this encounter.       Dialysis/Infusion     No service coordination in this encounter.      Home Medical Care     No service coordination in this encounter.      Social Care     No service coordination in this encounter.                Demographic Summary    No documentation.           Functional Status    No documentation.           Psychosocial    No documentation.           Abuse/Neglect    No documentation.           Legal    No documentation.           Substance Abuse    No documentation.           Patient Forms    No documentation.         KELBY Price

## 2018-11-02 NOTE — PLAN OF CARE
Problem: Skin Injury Risk (Adult)  Goal: Identify Related Risk Factors and Signs and Symptoms  Outcome: Outcome(s) achieved Date Met: 11/02/18    Goal: Skin Health and Integrity  Outcome: Ongoing (interventions implemented as appropriate)      Problem: Patient Care Overview  Goal: Plan of Care Review  Outcome: Ongoing (interventions implemented as appropriate)   11/02/18 3251   Coping/Psychosocial   Plan of Care Reviewed With patient;daughter   Plan of Care Review   Progress no change   OTHER   Outcome Summary Pt admitted with left femur fracture. Pt recently underwent left femur pining. Fell at nursing home. Pt has baseline dementia. Left leg is rotated, and hip is swollen. Bruising noted to left hip, penis and scrotum. Previous incision ANASTACIO with staples. Singleton inserted. NPO for possible surgery.        Problem: Fall Risk (Adult)  Goal: Identify Related Risk Factors and Signs and Symptoms  Outcome: Outcome(s) achieved Date Met: 11/02/18    Goal: Absence of Fall  Outcome: Ongoing (interventions implemented as appropriate)      Problem: Fracture Orthopaedic (Adult)  Goal: Signs and Symptoms of Listed Potential Problems Will be Absent, Minimized or Managed (Fracture Orthopaedic)  Outcome: Ongoing (interventions implemented as appropriate)

## 2018-11-03 ENCOUNTER — APPOINTMENT (OUTPATIENT)
Dept: GENERAL RADIOLOGY | Facility: HOSPITAL | Age: 83
End: 2018-11-03

## 2018-11-03 ENCOUNTER — ANESTHESIA EVENT (OUTPATIENT)
Dept: PERIOP | Facility: HOSPITAL | Age: 83
End: 2018-11-03

## 2018-11-03 ENCOUNTER — ANESTHESIA (OUTPATIENT)
Dept: PERIOP | Facility: HOSPITAL | Age: 83
End: 2018-11-03

## 2018-11-03 LAB
ANION GAP SERPL CALCULATED.3IONS-SCNC: 10 MMOL/L (ref 4–13)
BASOPHILS # BLD AUTO: 0.03 10*3/MM3 (ref 0–0.2)
BASOPHILS NFR BLD AUTO: 0.3 % (ref 0–2)
BUN BLD-MCNC: 25 MG/DL (ref 5–21)
BUN/CREAT SERPL: 30.5 (ref 7–25)
CALCIUM SPEC-SCNC: 8.5 MG/DL (ref 8.4–10.4)
CHLORIDE SERPL-SCNC: 97 MMOL/L (ref 98–110)
CO2 SERPL-SCNC: 28 MMOL/L (ref 24–31)
CREAT BLD-MCNC: 0.82 MG/DL (ref 0.5–1.4)
DEPRECATED RDW RBC AUTO: 47.8 FL (ref 40–54)
EOSINOPHIL # BLD AUTO: 0.09 10*3/MM3 (ref 0–0.7)
EOSINOPHIL NFR BLD AUTO: 0.8 % (ref 0–4)
ERYTHROCYTE [DISTWIDTH] IN BLOOD BY AUTOMATED COUNT: 15.8 % (ref 12–15)
GFR SERPL CREATININE-BSD FRML MDRD: 89 ML/MIN/1.73
GLUCOSE BLD-MCNC: 99 MG/DL (ref 70–100)
HCT VFR BLD AUTO: 26.8 % (ref 40–52)
HCT VFR BLD AUTO: 26.8 % (ref 40–52)
HCT VFR BLD AUTO: 28.6 % (ref 40–52)
HGB BLD-MCNC: 8.9 G/DL (ref 14–18)
HGB BLD-MCNC: 8.9 G/DL (ref 14–18)
HGB BLD-MCNC: 9.5 G/DL (ref 14–18)
IMM GRANULOCYTES # BLD: 0.17 10*3/MM3 (ref 0–0.03)
IMM GRANULOCYTES NFR BLD: 1.6 % (ref 0–5)
INR PPP: 1.26 (ref 0.91–1.09)
LYMPHOCYTES # BLD AUTO: 0.75 10*3/MM3 (ref 0.72–4.86)
LYMPHOCYTES NFR BLD AUTO: 7.1 % (ref 15–45)
MCH RBC QN AUTO: 28.1 PG (ref 28–32)
MCHC RBC AUTO-ENTMCNC: 33.2 G/DL (ref 33–36)
MCV RBC AUTO: 84.5 FL (ref 82–95)
MONOCYTES # BLD AUTO: 0.76 10*3/MM3 (ref 0.19–1.3)
MONOCYTES NFR BLD AUTO: 7.1 % (ref 4–12)
NEUTROPHILS # BLD AUTO: 8.83 10*3/MM3 (ref 1.87–8.4)
NEUTROPHILS NFR BLD AUTO: 83.1 % (ref 39–78)
NRBC BLD MANUAL-RTO: 0 /100 WBC (ref 0–0)
PLATELET # BLD AUTO: 339 10*3/MM3 (ref 130–400)
PMV BLD AUTO: 9.8 FL (ref 6–12)
POTASSIUM BLD-SCNC: 3.8 MMOL/L (ref 3.5–5.3)
PROTHROMBIN TIME: 16.2 SECONDS (ref 11.9–14.6)
RBC # BLD AUTO: 3.17 10*6/MM3 (ref 4.8–5.9)
SODIUM BLD-SCNC: 135 MMOL/L (ref 135–145)
WBC NRBC COR # BLD: 10.63 10*3/MM3 (ref 4.8–10.8)

## 2018-11-03 PROCEDURE — 80048 BASIC METABOLIC PNL TOTAL CA: CPT | Performed by: NURSE PRACTITIONER

## 2018-11-03 PROCEDURE — C1769 GUIDE WIRE: HCPCS | Performed by: ORTHOPAEDIC SURGERY

## 2018-11-03 PROCEDURE — 25010000002 PROPOFOL 10 MG/ML EMULSION: Performed by: NURSE ANESTHETIST, CERTIFIED REGISTERED

## 2018-11-03 PROCEDURE — 86927 PLASMA FRESH FROZEN: CPT

## 2018-11-03 PROCEDURE — P9017 PLASMA 1 DONOR FRZ W/IN 8 HR: HCPCS

## 2018-11-03 PROCEDURE — 85014 HEMATOCRIT: CPT | Performed by: NURSE PRACTITIONER

## 2018-11-03 PROCEDURE — 25010000002 FUROSEMIDE PER 20 MG: Performed by: NURSE ANESTHETIST, CERTIFIED REGISTERED

## 2018-11-03 PROCEDURE — 25010000002 MORPHINE SULFATE (PF) 2 MG/ML SOLUTION: Performed by: FAMILY MEDICINE

## 2018-11-03 PROCEDURE — 76000 FLUOROSCOPY <1 HR PHYS/QHP: CPT

## 2018-11-03 PROCEDURE — 0QP704Z REMOVAL OF INTERNAL FIXATION DEVICE FROM LEFT UPPER FEMUR, OPEN APPROACH: ICD-10-PCS | Performed by: ORTHOPAEDIC SURGERY

## 2018-11-03 PROCEDURE — 25010000002 NEOSTIGMINE PER 0.5 MG: Performed by: NURSE ANESTHETIST, CERTIFIED REGISTERED

## 2018-11-03 PROCEDURE — C1713 ANCHOR/SCREW BN/BN,TIS/BN: HCPCS | Performed by: ORTHOPAEDIC SURGERY

## 2018-11-03 PROCEDURE — 36430 TRANSFUSION BLD/BLD COMPNT: CPT

## 2018-11-03 PROCEDURE — 94799 UNLISTED PULMONARY SVC/PX: CPT

## 2018-11-03 PROCEDURE — 25010000002 FUROSEMIDE PER 20 MG: Performed by: NURSE PRACTITIONER

## 2018-11-03 PROCEDURE — 73552 X-RAY EXAM OF FEMUR 2/>: CPT

## 2018-11-03 PROCEDURE — 86900 BLOOD TYPING SEROLOGIC ABO: CPT

## 2018-11-03 PROCEDURE — P9016 RBC LEUKOCYTES REDUCED: HCPCS

## 2018-11-03 PROCEDURE — 25010000003 CEFAZOLIN PER 500 MG: Performed by: NURSE ANESTHETIST, CERTIFIED REGISTERED

## 2018-11-03 PROCEDURE — 25010000002 FENTANYL CITRATE (PF) 100 MCG/2ML SOLUTION: Performed by: NURSE ANESTHETIST, CERTIFIED REGISTERED

## 2018-11-03 PROCEDURE — 85018 HEMOGLOBIN: CPT | Performed by: NURSE PRACTITIONER

## 2018-11-03 PROCEDURE — 85610 PROTHROMBIN TIME: CPT | Performed by: ORTHOPAEDIC SURGERY

## 2018-11-03 PROCEDURE — 36415 COLL VENOUS BLD VENIPUNCTURE: CPT | Performed by: NURSE PRACTITIONER

## 2018-11-03 PROCEDURE — 0QH706Z INSERTION OF INTRAMEDULLARY INTERNAL FIXATION DEVICE INTO LEFT UPPER FEMUR, OPEN APPROACH: ICD-10-PCS | Performed by: ORTHOPAEDIC SURGERY

## 2018-11-03 PROCEDURE — 25010000002 ONDANSETRON PER 1 MG: Performed by: NURSE ANESTHETIST, CERTIFIED REGISTERED

## 2018-11-03 PROCEDURE — 94760 N-INVAS EAR/PLS OXIMETRY 1: CPT

## 2018-11-03 PROCEDURE — 85025 COMPLETE CBC W/AUTO DIFF WBC: CPT | Performed by: NURSE PRACTITIONER

## 2018-11-03 DEVICE — IMPLANTABLE DEVICE: Type: IMPLANTABLE DEVICE | Site: TROCHANTER | Status: FUNCTIONAL

## 2018-11-03 DEVICE — SCRW LK STRDRV TI 5X44MM STRL: Type: IMPLANTABLE DEVICE | Site: TROCHANTER | Status: FUNCTIONAL

## 2018-11-03 DEVICE — CABL W CR1.7MM 750ML STRL: Type: IMPLANTABLE DEVICE | Site: TROCHANTER | Status: FUNCTIONAL

## 2018-11-03 RX ORDER — FUROSEMIDE 10 MG/ML
40 INJECTION INTRAMUSCULAR; INTRAVENOUS ONCE
Status: COMPLETED | OUTPATIENT
Start: 2018-11-03 | End: 2018-11-03

## 2018-11-03 RX ORDER — MIDAZOLAM HYDROCHLORIDE 1 MG/ML
2 INJECTION INTRAMUSCULAR; INTRAVENOUS
Status: DISCONTINUED | OUTPATIENT
Start: 2018-11-03 | End: 2018-11-07

## 2018-11-03 RX ORDER — MORPHINE SULFATE 2 MG/ML
2 INJECTION, SOLUTION INTRAMUSCULAR; INTRAVENOUS
Status: DISCONTINUED | OUTPATIENT
Start: 2018-11-03 | End: 2018-11-03 | Stop reason: HOSPADM

## 2018-11-03 RX ORDER — NALOXONE HCL 0.4 MG/ML
0.04 VIAL (ML) INJECTION AS NEEDED
Status: DISCONTINUED | OUTPATIENT
Start: 2018-11-03 | End: 2018-11-03 | Stop reason: HOSPADM

## 2018-11-03 RX ORDER — FUROSEMIDE 10 MG/ML
INJECTION INTRAMUSCULAR; INTRAVENOUS AS NEEDED
Status: DISCONTINUED | OUTPATIENT
Start: 2018-11-03 | End: 2018-11-03 | Stop reason: SURG

## 2018-11-03 RX ORDER — SODIUM CHLORIDE 0.9 % (FLUSH) 0.9 %
3 SYRINGE (ML) INJECTION EVERY 12 HOURS SCHEDULED
Status: DISCONTINUED | OUTPATIENT
Start: 2018-11-03 | End: 2018-11-08 | Stop reason: SDUPTHER

## 2018-11-03 RX ORDER — GLYCOPYRROLATE 0.2 MG/ML
INJECTION INTRAMUSCULAR; INTRAVENOUS AS NEEDED
Status: DISCONTINUED | OUTPATIENT
Start: 2018-11-03 | End: 2018-11-03 | Stop reason: SURG

## 2018-11-03 RX ORDER — MIDAZOLAM HYDROCHLORIDE 1 MG/ML
1 INJECTION INTRAMUSCULAR; INTRAVENOUS
Status: DISCONTINUED | OUTPATIENT
Start: 2018-11-03 | End: 2018-11-07

## 2018-11-03 RX ORDER — HYDRALAZINE HYDROCHLORIDE 20 MG/ML
5 INJECTION INTRAMUSCULAR; INTRAVENOUS
Status: DISCONTINUED | OUTPATIENT
Start: 2018-11-03 | End: 2018-11-03 | Stop reason: HOSPADM

## 2018-11-03 RX ORDER — MEPERIDINE HYDROCHLORIDE 25 MG/ML
12.5 INJECTION INTRAMUSCULAR; INTRAVENOUS; SUBCUTANEOUS
Status: DISCONTINUED | OUTPATIENT
Start: 2018-11-03 | End: 2018-11-03 | Stop reason: HOSPADM

## 2018-11-03 RX ORDER — ACETAMINOPHEN 500 MG
1000 TABLET ORAL ONCE
Status: COMPLETED | OUTPATIENT
Start: 2018-11-03 | End: 2018-11-03

## 2018-11-03 RX ORDER — ONDANSETRON 2 MG/ML
INJECTION INTRAMUSCULAR; INTRAVENOUS AS NEEDED
Status: DISCONTINUED | OUTPATIENT
Start: 2018-11-03 | End: 2018-11-03 | Stop reason: SURG

## 2018-11-03 RX ORDER — LABETALOL HYDROCHLORIDE 5 MG/ML
5 INJECTION, SOLUTION INTRAVENOUS
Status: DISCONTINUED | OUTPATIENT
Start: 2018-11-03 | End: 2018-11-03 | Stop reason: HOSPADM

## 2018-11-03 RX ORDER — IPRATROPIUM BROMIDE AND ALBUTEROL SULFATE 2.5; .5 MG/3ML; MG/3ML
3 SOLUTION RESPIRATORY (INHALATION) ONCE AS NEEDED
Status: DISCONTINUED | OUTPATIENT
Start: 2018-11-03 | End: 2018-11-03 | Stop reason: HOSPADM

## 2018-11-03 RX ORDER — FENTANYL CITRATE 50 UG/ML
INJECTION, SOLUTION INTRAMUSCULAR; INTRAVENOUS AS NEEDED
Status: DISCONTINUED | OUTPATIENT
Start: 2018-11-03 | End: 2018-11-03 | Stop reason: SURG

## 2018-11-03 RX ORDER — SODIUM CHLORIDE, SODIUM LACTATE, POTASSIUM CHLORIDE, CALCIUM CHLORIDE 600; 310; 30; 20 MG/100ML; MG/100ML; MG/100ML; MG/100ML
INJECTION, SOLUTION INTRAVENOUS CONTINUOUS PRN
Status: DISCONTINUED | OUTPATIENT
Start: 2018-11-03 | End: 2018-11-03 | Stop reason: SURG

## 2018-11-03 RX ORDER — SODIUM CHLORIDE, SODIUM LACTATE, POTASSIUM CHLORIDE, CALCIUM CHLORIDE 600; 310; 30; 20 MG/100ML; MG/100ML; MG/100ML; MG/100ML
50 INJECTION, SOLUTION INTRAVENOUS CONTINUOUS
Status: DISCONTINUED | OUTPATIENT
Start: 2018-11-03 | End: 2018-11-04

## 2018-11-03 RX ORDER — SODIUM CHLORIDE, SODIUM LACTATE, POTASSIUM CHLORIDE, CALCIUM CHLORIDE 600; 310; 30; 20 MG/100ML; MG/100ML; MG/100ML; MG/100ML
100 INJECTION, SOLUTION INTRAVENOUS CONTINUOUS
Status: DISCONTINUED | OUTPATIENT
Start: 2018-11-03 | End: 2018-11-04

## 2018-11-03 RX ORDER — FENTANYL CITRATE 50 UG/ML
25 INJECTION, SOLUTION INTRAMUSCULAR; INTRAVENOUS AS NEEDED
Status: DISCONTINUED | OUTPATIENT
Start: 2018-11-03 | End: 2018-11-03 | Stop reason: HOSPADM

## 2018-11-03 RX ORDER — CEFAZOLIN SODIUM 1 G/3ML
INJECTION, POWDER, FOR SOLUTION INTRAMUSCULAR; INTRAVENOUS AS NEEDED
Status: DISCONTINUED | OUTPATIENT
Start: 2018-11-03 | End: 2018-11-03 | Stop reason: SURG

## 2018-11-03 RX ORDER — FLUMAZENIL 0.1 MG/ML
0.2 INJECTION INTRAVENOUS AS NEEDED
Status: DISCONTINUED | OUTPATIENT
Start: 2018-11-03 | End: 2018-11-03 | Stop reason: HOSPADM

## 2018-11-03 RX ORDER — PROPOFOL 10 MG/ML
VIAL (ML) INTRAVENOUS AS NEEDED
Status: DISCONTINUED | OUTPATIENT
Start: 2018-11-03 | End: 2018-11-03 | Stop reason: SURG

## 2018-11-03 RX ORDER — SODIUM CHLORIDE 0.9 % (FLUSH) 0.9 %
1-10 SYRINGE (ML) INJECTION AS NEEDED
Status: DISCONTINUED | OUTPATIENT
Start: 2018-11-03 | End: 2018-11-08 | Stop reason: SDUPTHER

## 2018-11-03 RX ORDER — ONDANSETRON 2 MG/ML
4 INJECTION INTRAMUSCULAR; INTRAVENOUS AS NEEDED
Status: DISCONTINUED | OUTPATIENT
Start: 2018-11-03 | End: 2018-11-03 | Stop reason: HOSPADM

## 2018-11-03 RX ORDER — ROCURONIUM BROMIDE 10 MG/ML
INJECTION, SOLUTION INTRAVENOUS AS NEEDED
Status: DISCONTINUED | OUTPATIENT
Start: 2018-11-03 | End: 2018-11-03 | Stop reason: SURG

## 2018-11-03 RX ORDER — MAGNESIUM HYDROXIDE 1200 MG/15ML
LIQUID ORAL AS NEEDED
Status: DISCONTINUED | OUTPATIENT
Start: 2018-11-03 | End: 2018-11-03 | Stop reason: HOSPADM

## 2018-11-03 RX ORDER — PHENYLEPHRINE HCL IN 0.9% NACL 0.8MG/10ML
SYRINGE (ML) INTRAVENOUS AS NEEDED
Status: DISCONTINUED | OUTPATIENT
Start: 2018-11-03 | End: 2018-11-03 | Stop reason: SURG

## 2018-11-03 RX ORDER — OXYCODONE AND ACETAMINOPHEN 10; 325 MG/1; MG/1
1 TABLET ORAL ONCE AS NEEDED
Status: DISCONTINUED | OUTPATIENT
Start: 2018-11-03 | End: 2018-11-03 | Stop reason: HOSPADM

## 2018-11-03 RX ADMIN — QUETIAPINE FUMARATE 50 MG: 25 TABLET, FILM COATED ORAL at 20:30

## 2018-11-03 RX ADMIN — Medication 3 MG: at 12:46

## 2018-11-03 RX ADMIN — Medication 80 MCG: at 11:32

## 2018-11-03 RX ADMIN — GLYCOPYRROLATE 0.2 MG: 0.2 INJECTION, SOLUTION INTRAMUSCULAR; INTRAVENOUS at 12:46

## 2018-11-03 RX ADMIN — MORPHINE SULFATE 2 MG: 2 INJECTION, SOLUTION INTRAMUSCULAR; INTRAVENOUS at 03:46

## 2018-11-03 RX ADMIN — Medication 3 ML: at 08:27

## 2018-11-03 RX ADMIN — HYDROCODONE BITARTRATE AND ACETAMINOPHEN 1 TABLET: 5; 325 TABLET ORAL at 17:55

## 2018-11-03 RX ADMIN — HYDROCHLOROTHIAZIDE 25 MG: 25 TABLET ORAL at 16:28

## 2018-11-03 RX ADMIN — FENTANYL CITRATE 25 MCG: 50 INJECTION INTRAMUSCULAR; INTRAVENOUS at 13:15

## 2018-11-03 RX ADMIN — Medication 3 ML: at 20:30

## 2018-11-03 RX ADMIN — ONDANSETRON HYDROCHLORIDE 4 MG: 2 SOLUTION INTRAMUSCULAR; INTRAVENOUS at 12:44

## 2018-11-03 RX ADMIN — FENTANYL CITRATE 50 MCG: 50 INJECTION, SOLUTION INTRAMUSCULAR; INTRAVENOUS at 11:03

## 2018-11-03 RX ADMIN — FAMOTIDINE 20 MG: 10 INJECTION INTRAVENOUS at 08:27

## 2018-11-03 RX ADMIN — SODIUM CHLORIDE: 9 INJECTION, SOLUTION INTRAVENOUS at 10:51

## 2018-11-03 RX ADMIN — CARBAMIDE PEROXIDE 6.5% 10 DROP: 6.5 LIQUID AURICULAR (OTIC) at 20:29

## 2018-11-03 RX ADMIN — ACETAMINOPHEN 1000 MG: 500 TABLET, FILM COATED ORAL at 16:42

## 2018-11-03 RX ADMIN — SODIUM CHLORIDE, POTASSIUM CHLORIDE, SODIUM LACTATE AND CALCIUM CHLORIDE: 600; 310; 30; 20 INJECTION, SOLUTION INTRAVENOUS at 12:48

## 2018-11-03 RX ADMIN — PROPOFOL 50 MG: 10 INJECTION, EMULSION INTRAVENOUS at 10:45

## 2018-11-03 RX ADMIN — Medication 80 MCG: at 12:17

## 2018-11-03 RX ADMIN — SODIUM CHLORIDE, POTASSIUM CHLORIDE, SODIUM LACTATE AND CALCIUM CHLORIDE: 600; 310; 30; 20 INJECTION, SOLUTION INTRAVENOUS at 10:38

## 2018-11-03 RX ADMIN — FUROSEMIDE 20 MG: 10 INJECTION, SOLUTION INTRAMUSCULAR; INTRAVENOUS at 12:45

## 2018-11-03 RX ADMIN — FUROSEMIDE 20 MG: 10 INJECTION, SOLUTION INTRAMUSCULAR; INTRAVENOUS at 12:09

## 2018-11-03 RX ADMIN — FUROSEMIDE 40 MG: 10 INJECTION, SOLUTION INTRAMUSCULAR; INTRAVENOUS at 16:28

## 2018-11-03 RX ADMIN — CEFAZOLIN 1 G: 1 INJECTION, POWDER, FOR SOLUTION INTRAVENOUS at 10:52

## 2018-11-03 RX ADMIN — HYDROCODONE BITARTRATE AND ACETAMINOPHEN 1 TABLET: 5; 325 TABLET ORAL at 01:00

## 2018-11-03 RX ADMIN — MORPHINE SULFATE 2 MG: 2 INJECTION, SOLUTION INTRAMUSCULAR; INTRAVENOUS at 08:27

## 2018-11-03 RX ADMIN — ROCURONIUM BROMIDE 30 MG: 10 INJECTION INTRAVENOUS at 10:45

## 2018-11-03 RX ADMIN — HYDROCODONE BITARTRATE AND ACETAMINOPHEN 1 TABLET: 5; 325 TABLET ORAL at 23:57

## 2018-11-03 RX ADMIN — FENTANYL CITRATE 50 MCG: 50 INJECTION, SOLUTION INTRAMUSCULAR; INTRAVENOUS at 10:42

## 2018-11-03 NOTE — ANESTHESIA PROCEDURE NOTES
Peripheral IV    Patient location during procedure: OR  Line placed for Fluids/Medication Admin.  Performed By   CRNA: ISMAEL ELLIS  Preanesthetic Checklist  Completed: patient identified, site marked, surgical consent, pre-op evaluation, timeout performed, IV checked, risks and benefits discussed and monitors and equipment checked  Peripheral IV Prep   Patient position: supine   Prep: ChloraPrep  Patient monitoring: heart rate and continuous pulse ox  Peripheral IV Procedure   Laterality:right  Location:  Antecubital  Catheter size: 18 G         Post Assessment   Dressing Type: transparent and tape.    IV Dressing/Site: clean, dry and intact

## 2018-11-03 NOTE — ANESTHESIA PROCEDURE NOTES
Airway  Urgency: elective    Airway not difficult    General Information and Staff    Patient location during procedure: OR  CRNA: ISMAEL ELLIS    Indications and Patient Condition  Indications for airway management: airway protection    Preoxygenated: yes  MILS maintained throughout  Mask difficulty assessment: 1 - vent by mask    Final Airway Details  Final airway type: endotracheal airway      Successful airway: ETT  Cuffed: yes   Successful intubation technique: direct laryngoscopy  Endotracheal tube insertion site: oral  Blade: Vitale  Blade size: 2  ETT size: 7.5 mm  Cormack-Lehane Classification: grade I - full view of glottis  Placement verified by: chest auscultation and capnometry   Cuff volume (mL): 5  Measured from: gums  ETT to gums (cm): 21  Number of attempts at approach: 1    Additional Comments  Atraumatic dl on pt bed

## 2018-11-03 NOTE — PLAN OF CARE
Problem: Skin Injury Risk (Adult)  Goal: Skin Health and Integrity  Outcome: Ongoing (interventions implemented as appropriate)      Problem: Patient Care Overview  Goal: Plan of Care Review  Outcome: Ongoing (interventions implemented as appropriate)   11/02/18 1942   Coping/Psychosocial   Plan of Care Reviewed With patient   Plan of Care Review   Progress no change   OTHER   Outcome Summary vss. PT REICIVING FIRST OUT OF 2 UNITS OF PRBC'S. PT TREATED WITH MORPHINE TO CONTROL PAIN. PT HAD BM TODAY. POSSIBLE SURGERY TOMORROW. WILL CONTINUE TO MONITOIR.      Goal: Individualization and Mutuality  Outcome: Ongoing (interventions implemented as appropriate)    Goal: Discharge Needs Assessment  Outcome: Ongoing (interventions implemented as appropriate)    Goal: Interprofessional Rounds/Family Conf  Outcome: Ongoing (interventions implemented as appropriate)      Problem: Fall Risk (Adult)  Goal: Absence of Fall  Outcome: Ongoing (interventions implemented as appropriate)      Problem: Fracture Orthopaedic (Adult)  Goal: Signs and Symptoms of Listed Potential Problems Will be Absent, Minimized or Managed (Fracture Orthopaedic)  Outcome: Ongoing (interventions implemented as appropriate)      Problem: Nutrition, Imbalanced: Inadequate Oral Intake (Adult)  Goal: Improved Oral Intake  Outcome: Ongoing (interventions implemented as appropriate)    Goal: Prevent Further Weight Loss  Outcome: Ongoing (interventions implemented as appropriate)

## 2018-11-03 NOTE — ANESTHESIA POSTPROCEDURE EVALUATION
Patient: Burke Reinoso Jr.    Procedure Summary     Date:  11/03/18 Room / Location:   [facility] OR  /  [facility] OR    Anesthesia Start:  1038 Anesthesia Stop:  1303    Procedure:  REMOVAL OF TROCHANTERIC FIXATION NAIL WITH PLACEMENT OF LONG TROCHANTERIC FIXATION NAIL AND CABLES (Left Hip) Diagnosis:      Surgeon:  Juan Colon MD Provider:  Navneet Willoughby CRNA    Anesthesia Type:  general ASA Status:  3 - Emergent          Anesthesia Type: general  Last vitals  BP   111/50 (11/03/18 1345)   Temp   97.7 °F (36.5 °C) (11/03/18 1345)   Pulse   84 (11/03/18 1500)   Resp   14 (11/03/18 1345)     SpO2   96 % (11/03/18 1500)     Post Anesthesia Care and Evaluation    Patient location during evaluation: PACU  Patient participation: complete - patient participated  Level of consciousness: awake and alert  Pain score: 0  Pain management: adequate  Airway patency: patent  Anesthetic complications: No anesthetic complications  PONV Status: none  Cardiovascular status: acceptable and stable  Respiratory status: acceptable  Hydration status: acceptable

## 2018-11-03 NOTE — PROGRESS NOTES
[facility] Medicine Services  INPATIENT PROGRESS NOTE    Patient Name: Burke Reinoso Jr.  Date of Admission: 11/1/2018  Today's Date: 11/03/18  Length of Stay: 1  Primary Care Physician: Mg Colmenares MD    Subjective   Chief Complaint: left hip pain    HPI    Currently laying in bed with family at bedside.  He is baseline confused and is currently lethargic but does open his eyes.  He is anticipating surgery today to repair his fracture.  Currently does not seem to be in any pain.     Review of Systems   Constitutional: Positive for activity change and fatigue. Negative for appetite change, chills and fever.   HENT: Negative for hearing loss, nosebleeds, tinnitus and trouble swallowing.    Eyes: Negative for visual disturbance.   Respiratory: Negative for cough, chest tightness, shortness of breath and wheezing.    Cardiovascular: Negative for chest pain, palpitations and leg swelling.   Gastrointestinal: Negative for abdominal distention, abdominal pain, blood in stool, constipation, diarrhea, nausea and vomiting.   Endocrine: Negative for cold intolerance, heat intolerance, polydipsia, polyphagia and polyuria.   Genitourinary: Negative for decreased urine volume, difficulty urinating, dysuria, flank pain, frequency and hematuria.   Musculoskeletal: Positive for gait problem, joint swelling and myalgias. Negative for arthralgias.   Skin: Negative for rash.   Allergic/Immunologic: Negative for immunocompromised state.   Neurological: Positive for weakness. Negative for dizziness, syncope, light-headedness and headaches.   Hematological: Negative for adenopathy. Does not bruise/bleed easily.   Psychiatric/Behavioral: Positive for confusion. Negative for sleep disturbance. The patient is not nervous/anxious.       All pertinent negatives and positives are as above. All other systems have been reviewed and are negative unless otherwise stated.     Objective    Temp:  [97.7  °F (36.5 °C)-99.3 °F (37.4 °C)] 98.2 °F (36.8 °C)  Heart Rate:  [] 92  Resp:  [16-18] 18  BP: (103-143)/(40-90) 120/60     Physical Exam   Constitutional: He appears well-developed and well-nourished.   Laying in bed. NAD.   HENT:   Head: Normocephalic and atraumatic.   Eyes: Pupils are equal, round, and reactive to light. Conjunctivae and EOM are normal.   Neck: Neck supple. No JVD present. No thyromegaly present.   Cardiovascular: Normal rate, regular rhythm, normal heart sounds and intact distal pulses.  Exam reveals no gallop and no friction rub.    No murmur heard.  Pulmonary/Chest: Effort normal and breath sounds normal. No respiratory distress. He has no wheezes. He has no rales. He exhibits no tenderness.   Abdominal: Soft. Bowel sounds are normal. He exhibits no distension. There is no tenderness. There is no rebound and no guarding.   Genitourinary:   Genitourinary Comments: Singleton.    Musculoskeletal: Normal range of motion. He exhibits no edema, tenderness or deformity.   Bruising noted to left hip/groin   Lymphadenopathy:     He has no cervical adenopathy.   Neurological: He is alert. He is disoriented.   Skin: Skin is warm and dry. No rash noted.   Psychiatric: He has a normal mood and affect. His behavior is normal. Judgment and thought content normal.   Nursing note and vitals reviewed.    Results Review:  I have reviewed the labs, radiology results, and diagnostic studies.    Laboratory Data:     Results from last 7 days  Lab Units 11/03/18  0749 11/02/18  0440 11/01/18  2337   WBC 10*3/mm3 10.63  --  14.26*   HEMOGLOBIN g/dL 8.9*  8.9* 8.2* 8.7*   HEMATOCRIT % 26.8*  26.8* 25.4* 26.1*   PLATELETS 10*3/mm3 339  --  330        Results from last 7 days  Lab Units 11/03/18  0749 11/02/18  0440 11/01/18  2337   SODIUM mmol/L 135 135 135   POTASSIUM mmol/L 3.8 4.4 4.6   CHLORIDE mmol/L 97* 94* 94*   CO2 mmol/L 28.0 30.0 31.0   BUN mg/dL 25* 25* 26*   CREATININE mg/dL 0.82 0.81 0.87   CALCIUM mg/dL  8.5 8.5 8.6   BILIRUBIN mg/dL  --   --  1.2*   ALK PHOS U/L  --   --  80   ALT (SGPT) U/L  --   --  37   AST (SGOT) U/L  --   --  47*   GLUCOSE mg/dL 99 110* 129*     Radiology Data:   Imaging Results (last 24 hours)     ** No results found for the last 24 hours. **        I have reviewed the patient's current medications.     Assessment/Plan     Assessment:  1.  Acute traumatic comminuted periprosthetic fracture along the proximal diaphysis of the femur  2.  Frequent falls  3.  Recent left intertrochanteric hip fracture with trochanteric fixation nailing on 10/27/18  4.  Acute blood loss anemia from recent surgery   5.  Acutely anticoagulated with Coumadin for DVT prophylaxis secondary to #3  6.  Constipation, chronic  7.  Mild protein malnutrition   8.  Essential hypertension   9.  Leukocytosis, suspected reactive    Plan:  1.  NPO for surgery later today with Dr. Colon  2.  Vitamin K given to reverse Coumadin per Dr. Colon  3.  Transfused 2 units PRBC and 2 units FFP yesterday now getting 2 more units of FFP.   4.  Bowel regimen   5.  CBC and BMP in AM  6.  SCDs for DVT prophylaxis  7.  Nutrition consult  8.  PT/INR in AM  9.  Chest xray normal  10.  2 units on hold if needed    Discharge Planning: I expect the patient to be discharged to [facility] in ? days.    Jacek Kinsey, APRN   11/03/18   10:14 AM

## 2018-11-03 NOTE — PLAN OF CARE
Problem: Skin Injury Risk (Adult)  Goal: Skin Health and Integrity  Outcome: Ongoing (interventions implemented as appropriate)      Problem: Patient Care Overview  Goal: Plan of Care Review  Outcome: Ongoing (interventions implemented as appropriate)   11/03/18 0256   Coping/Psychosocial   Plan of Care Reviewed With patient   Plan of Care Review   Progress no change   OTHER   Outcome Summary Pt to room 349 back from OR. Pt drowsy at this time intermittent moans of pain with jerking movements. Pt L hip swollen incision site drsg in plae no drainage noted. Pt blood completed at this time. Was infusing from OR.        Problem: Fall Risk (Adult)  Goal: Absence of Fall  Outcome: Ongoing (interventions implemented as appropriate)      Problem: Fracture Orthopaedic (Adult)  Goal: Signs and Symptoms of Listed Potential Problems Will be Absent, Minimized or Managed (Fracture Orthopaedic)  Outcome: Ongoing (interventions implemented as appropriate)      Problem: Nutrition, Imbalanced: Inadequate Oral Intake (Adult)  Goal: Identify Related Risk Factors and Signs and Symptoms  Outcome: Outcome(s) achieved Date Met: 11/03/18    Goal: Improved Oral Intake  Outcome: Ongoing (interventions implemented as appropriate)    Goal: Prevent Further Weight Loss  Outcome: Ongoing (interventions implemented as appropriate)

## 2018-11-03 NOTE — OP NOTE
[facility]  OP NOTE    Patient Name: Burke Reinoso Jr.     Date of Procedure: 11/3/2018     PREOPERATIVE DIAGNOSIS: Fracture left femur periprosthetic     POSTOPERATIVE DIAGNOSIS: Same.     PROCEDURE PERFORMED: #1 removal of pre-existing trochanteric fixation nail #2 placement of long trochanteric fixation nail left femur and cabling of femoral shaft fracture     SURGEON: Juan Colon MD      ANESTHESIA: General.    PREPARATION: Routine.    STAFF: Circulator: Luz Medina RN  Radiology Technologist: Gina Ratliff  Scrub Person: Antionette Chang  Assistant: Nabila Mcfarland    ESTIMATED BLOOD LOSS: 500 mL    SPECIMENS: None    COMPLICATIONS: None    INDICATIONS: Burke Reinoso Jr. is a 88 y.o. male who is post a recent trochanteric fixation nailing of an intertrochanteric fracture of his left hip.  He only recently was discharged and he fell sustaining a comminuted fracture of the shaft of the femur around the previous implant. The indications, risks, and possible complications of the procedure were explained to the patient, who voiced understanding and wished to proceed with surgery.  At surgery and exchange nailing was carried out removing the previous trochanteric fixation nail and replacing it with a 380 x 11 mm long trochanteric fixation nail with a 130 mm blade plate proximally and a 60 mm and 45 mm static distal locking screw with 2 mid shaft cables placed .     PROCEDURE IN DETAIL: The patient was taken to the operating room and placed on the fracture  table insupineosition. After general anesthesia was obtained, the  left hip and thigh were prepped and draped in a sterile mannePrevious staples were removed and the previous lateral incision was remade and carried down and through the deep fascia and extended distally.  2 smooth pins were then placed around the previous intertrochanteric fracture for temporary stabilization.  The blade plate was then unlocked and removed.  The  distal locking screw was then removed.  A long ball-tipped guidewire was then placed down to the knee in the previous trochanteric fixation nail was removed.  The definitive long nail was then impacted into position and the blade plate was then impacted up the previous reamed opening into the head of the femur and locked into position so it could slide the outrigger was then removed.  Attention was then placed distally.  Using the fluoroscopy the 2 distal locking screws were placed from lateral to medial attention was then placed to the mid shaft area of the femur.  2 cables were then passed and secured the fracture alignment overall appeared appropriate wounds were then irrigated.  The deep fascia was closed with Vicryl suture in a running fashion followed by chromic on the subcutaneous incision followed by staples on the skinSterile dressings were applied. The patient tolerated the procedure well. Sponge and needle counts were correct. The patient was then awakened and extubated in the operating room and taken to the recovery room in good condition.    Juan Colon MD  Date: 11/3/2018 Time: 12:28 PM

## 2018-11-03 NOTE — NURSING NOTE
Noted on patient on arrival to o.r.: penis and scrotum are badly bruised, blister on lt. Heel & rt. 2nd toe, pressure areas on side of lt. foot

## 2018-11-04 LAB
ABO + RH BLD: NORMAL
ANION GAP SERPL CALCULATED.3IONS-SCNC: 11 MMOL/L (ref 4–13)
BASOPHILS # BLD AUTO: 0.05 10*3/MM3 (ref 0–0.2)
BASOPHILS NFR BLD AUTO: 0.3 % (ref 0–2)
BH BB BLOOD EXPIRATION DATE: NORMAL
BH BB BLOOD TYPE BARCODE: 5100
BH BB DISPENSE STATUS: NORMAL
BH BB PRODUCT CODE: NORMAL
BH BB UNIT NUMBER: NORMAL
BUN BLD-MCNC: 28 MG/DL (ref 5–21)
BUN/CREAT SERPL: 29.5 (ref 7–25)
CALCIUM SPEC-SCNC: 8.1 MG/DL (ref 8.4–10.4)
CHLORIDE SERPL-SCNC: 95 MMOL/L (ref 98–110)
CO2 SERPL-SCNC: 30 MMOL/L (ref 24–31)
CREAT BLD-MCNC: 0.95 MG/DL (ref 0.5–1.4)
DEPRECATED RDW RBC AUTO: 47.2 FL (ref 40–54)
EOSINOPHIL # BLD AUTO: 0.02 10*3/MM3 (ref 0–0.7)
EOSINOPHIL NFR BLD AUTO: 0.1 % (ref 0–4)
ERYTHROCYTE [DISTWIDTH] IN BLOOD BY AUTOMATED COUNT: 15.7 % (ref 12–15)
GFR SERPL CREATININE-BSD FRML MDRD: 75 ML/MIN/1.73
GLUCOSE BLD-MCNC: 94 MG/DL (ref 70–100)
HCT VFR BLD AUTO: 27.9 % (ref 40–52)
HGB BLD-MCNC: 9.2 G/DL (ref 14–18)
IMM GRANULOCYTES # BLD: 0.2 10*3/MM3 (ref 0–0.03)
IMM GRANULOCYTES NFR BLD: 1.4 % (ref 0–5)
LYMPHOCYTES # BLD AUTO: 0.52 10*3/MM3 (ref 0.72–4.86)
LYMPHOCYTES NFR BLD AUTO: 3.5 % (ref 15–45)
MCH RBC QN AUTO: 27.9 PG (ref 28–32)
MCHC RBC AUTO-ENTMCNC: 33 G/DL (ref 33–36)
MCV RBC AUTO: 84.5 FL (ref 82–95)
MONOCYTES # BLD AUTO: 0.67 10*3/MM3 (ref 0.19–1.3)
MONOCYTES NFR BLD AUTO: 4.6 % (ref 4–12)
NEUTROPHILS # BLD AUTO: 13.23 10*3/MM3 (ref 1.87–8.4)
NEUTROPHILS NFR BLD AUTO: 90.1 % (ref 39–78)
NRBC BLD MANUAL-RTO: 0 /100 WBC (ref 0–0)
PLATELET # BLD AUTO: 337 10*3/MM3 (ref 130–400)
PMV BLD AUTO: 10.3 FL (ref 6–12)
POTASSIUM BLD-SCNC: 3.5 MMOL/L (ref 3.5–5.3)
RBC # BLD AUTO: 3.3 10*6/MM3 (ref 4.8–5.9)
SODIUM BLD-SCNC: 136 MMOL/L (ref 135–145)
UNIT  ABO: NORMAL
UNIT  RH: NORMAL
WBC NRBC COR # BLD: 14.69 10*3/MM3 (ref 4.8–10.8)

## 2018-11-04 PROCEDURE — G8997 SWALLOW GOAL STATUS: HCPCS

## 2018-11-04 PROCEDURE — G8979 MOBILITY GOAL STATUS: HCPCS

## 2018-11-04 PROCEDURE — G8996 SWALLOW CURRENT STATUS: HCPCS

## 2018-11-04 PROCEDURE — G8998 SWALLOW D/C STATUS: HCPCS

## 2018-11-04 PROCEDURE — 92610 EVALUATE SWALLOWING FUNCTION: CPT

## 2018-11-04 PROCEDURE — G8978 MOBILITY CURRENT STATUS: HCPCS

## 2018-11-04 PROCEDURE — 94760 N-INVAS EAR/PLS OXIMETRY 1: CPT

## 2018-11-04 PROCEDURE — 97161 PT EVAL LOW COMPLEX 20 MIN: CPT

## 2018-11-04 PROCEDURE — 80048 BASIC METABOLIC PNL TOTAL CA: CPT | Performed by: NURSE PRACTITIONER

## 2018-11-04 PROCEDURE — 85025 COMPLETE CBC W/AUTO DIFF WBC: CPT | Performed by: NURSE PRACTITIONER

## 2018-11-04 PROCEDURE — 94799 UNLISTED PULMONARY SVC/PX: CPT

## 2018-11-04 RX ADMIN — Medication 3 ML: at 20:57

## 2018-11-04 RX ADMIN — SODIUM CHLORIDE 50 ML/HR: 9 INJECTION, SOLUTION INTRAVENOUS at 06:36

## 2018-11-04 RX ADMIN — HYDROCODONE BITARTRATE AND ACETAMINOPHEN 1 TABLET: 5; 325 TABLET ORAL at 19:52

## 2018-11-04 RX ADMIN — HYDROCODONE BITARTRATE AND ACETAMINOPHEN 1 TABLET: 5; 325 TABLET ORAL at 15:07

## 2018-11-04 RX ADMIN — HYDROCHLOROTHIAZIDE 25 MG: 25 TABLET ORAL at 09:19

## 2018-11-04 RX ADMIN — POLYETHYLENE GLYCOL 3350 17 G: 17 POWDER, FOR SOLUTION ORAL at 09:20

## 2018-11-04 RX ADMIN — HYDROCODONE BITARTRATE AND ACETAMINOPHEN 1 TABLET: 5; 325 TABLET ORAL at 09:20

## 2018-11-04 RX ADMIN — CARBAMIDE PEROXIDE 6.5% 10 DROP: 6.5 LIQUID AURICULAR (OTIC) at 20:57

## 2018-11-04 RX ADMIN — FAMOTIDINE 20 MG: 10 INJECTION INTRAVENOUS at 09:23

## 2018-11-04 RX ADMIN — Medication 3 ML: at 09:21

## 2018-11-04 RX ADMIN — QUETIAPINE FUMARATE 50 MG: 25 TABLET, FILM COATED ORAL at 20:57

## 2018-11-04 NOTE — PLAN OF CARE
Problem: Patient Care Overview  Goal: Plan of Care Review  Outcome: Ongoing (interventions implemented as appropriate)   11/04/18 0817   Coping/Psychosocial   Plan of Care Reviewed With patient;daughter   OTHER   Outcome Summary PT eval completed. Pt very lethargic, poorly cooperative this AM. Pt educated on use of bed rail to assist with rolling, but would not attempt. Per nursing, pt is max A x2 for rolling secondary to pain and cognition difficulties. No sensation deficits noted, RLE PROM WFL. Pt would benefit from skillled PT services to increase strength and safety with functional mobility. Currently anticipating d/c to SNF.

## 2018-11-04 NOTE — PLAN OF CARE
Problem: Patient Care Overview  Goal: Plan of Care Review  Outcome: Ongoing (interventions implemented as appropriate)   11/04/18 0913   Coping/Psychosocial   Plan of Care Reviewed With daughter;other (see comments)  (RN, Yoli)   Plan of Care Review   Progress (Eval)   OTHER   Outcome Summary Bedside swallow eval completed. Pt was assessed during consumption of breakfast tray, which consisted to mechanical soft, regular solid, and thin liquids. Semi-reclined position is maximal level of upright positioning pt was able to tolerate secondary to pain. RN arrived during mid portion of eval and provided PO pain med. Pt was fed each of the presented diet consistencies per SLP, yet often self fed when consuming liquids. Pt was noted to have decreased rotary chew, yet functional, with prolonged oral manipulation of the bolus, though felt to be functional for the pt at this time. This also resulted in delayed oral transit. No noted concerns with laryngeal elevation. Mild oral residue following mech soft and solid trials at times, lingually, without concern for pocketing of residue in the sulci bilaterally. Multiple swallows noted with thin liquids when consumed via straw, yet it must be noted that pt appeared to consume increased bolus size. No overt s/s of aspiration observed. RN ascultated lungs at the bedside at time of eval, reporting no concerns. Educated daughter that ST feels continued services are no longer warranted, as swallow fx appeared to be WFL at time of eval. However, cannot fully r/o aspiration with PO intake. RECOMMENDATIONS: Continue current diet of regular solid diet consistency with regular/thin liquid consistency; meds whole with thin liquids; RN to monitor for increased congestion; general feeding/aspiration precautions. MD to re-consult if change or new concerns. Thanks!

## 2018-11-04 NOTE — THERAPY EVALUATION
Acute Care - Speech Language Pathology   Swallow Initial Evaluation [facility]     Patient Name: Burke Reinoso Jr.  : 1930  MRN: [id number]  Today's Date: 2018  Onset of Illness/Injury or Date of Surgery: (P) 18     Referring Physician: MERARY) Dr. Colon      Admit Date: 2018     SPEECH-LANGUAGE PATHOLOGY EVALUATION - SWALLOW  Subjective: The patient was seen on this date for a Clinical Swallow evaluation.  Patient was alert and cooperative. Evaluation was completed in a semi-upright/reclined position secondary to pt's inability to tolerate further increase in upright posture given L femur pain.  Significant history: Acute femur fx, post op x2 with most recent sx being 2018   Objective: Textures given included thin liquid, mechanical soft consistency and regular consistency.  Assessment: Pt was assessed during consumption of breakfast tray, which consisted to mechanical soft, regular solid, and thin liquids. Semi-reclined position is maximal level of upright positioning pt was able to tolerate secondary to pain. RN arrived during mid portion of eval and provided PO pain med. Pt was fed each of the presented diet consistencies per SLP, yet often self fed when consuming liquids. Pt was noted to have decreased rotary chew, yet functional, with prolonged oral manipulation of the bolus, though felt to be functional for the pt at this time. This also resulted in delayed oral transit. No noted concerns with laryngeal elevation. Mild oral residue following mech soft and solid trials at times, lingually, without concern for pocketing of residue in the sulci bilaterally. Multiple swallows noted with thin liquids when consumed via straw, yet it must be noted that pt appeared to consume increased bolus size. No overt s/s of aspiration observed. RN ascultated lungs at the bedside at time of eval, reporting no concerns. Educated daughter that ST feels continued services are no longer warranted, as  swallow fx appeared to be WFL at time of eval. However, cannot fully r/o aspiration with PO intake.   SLP Findings:  Patient presents with functional swallow, without esophageal component.   Recommendations: Diet Textures: thin liquid, regular consistency food.  Medications should be taken whole with thin liquids. May have water and ice between meals after oral care, under staff or family supervision and with the recommended strategies for safe swallowing.   Recommended Strategies: Upright for PO, small bites and sips, alternate liquids and solids and supervision with all PO. Oral care before breakfast, after all meals and PRN.  Other Recommended Evaluations: Re-evaluation at bedside to be ordered per MD if change or new concerns arise   Dysphagia therapy is not recommended, as it is felt swallow fx is WFL. Rationale: See above. Thanks!   Viviana Marsh, CCC-SLP 11/4/2018 10:56 AM    Visit Dx:     ICD-10-CM ICD-9-CM   1. Closed displaced comminuted fracture of shaft of left femur, initial encounter (CMS/Formerly [facility]) S72.352A 821.01   2. Dysphagia, unspecified type R13.10 787.20     Patient Active Problem List   Diagnosis   • Closed displaced intertrochanteric fracture of left femur (CMS/Formerly [facility])   • Closed displaced comminuted fracture of shaft of left femur (CMS/Formerly [facility])     Past Medical History:   Diagnosis Date   • Arthritis    • Dementia    • GERD (gastroesophageal reflux disease)    • Hip fracture (CMS/Formerly [facility])    • Hypertension      Past Surgical History:   Procedure Laterality Date   • HIP TROCANTERIC NAILING WITH INTRAMEDULLARY HIP SCREW Left 10/27/2018    Procedure: HIP TROCANTERIC NAILING SHORT WITH INTRAMEDULLARY HIP SCREW;  Surgeon: Juan Colon MD;  Location: [facility] OR;  Service: Orthopedics   • NO PAST SURGERIES N/A 10/27/2018    info from pt's daughter          SWALLOW EVALUATION (last 72 hours)      SLP Adult Swallow Evaluation     Row Name 11/04/18 0900                   Rehab Evaluation    Document Type  evaluation  -TM        Subjective Information complains of;pain   L leg  -TM        Patient Observations alert;cooperative  -TM        Patient/Family Observations Daughter present throughout eval, RN present throughout final portion of eval.  -TM        Patient Effort adequate  -TM           General Information    Patient Profile Reviewed yes  -TM        Pertinent History Of Current Problem Acute L femur fx, s/p fixation sx on 11/03/2018, CXR 11/02/2018 showed no radiographic evidence of acute pulmonary process.   -TM        Current Method of Nutrition regular textures;thin liquids  -TM        Precautions/Limitations, Vision WFL;for purposes of eval  -TM        Precautions/Limitations, Hearing hearing impairment, bilaterally;hearing impairment, left;other (see comments)   R hearing aid not present, does own, difficulty with R aide  -TM        Prior Level of Function-Communication cognitive-linguistic impairment  -TM        Prior Level of Function-Swallowing no diet consistency restrictions  -TM        Plans/Goals Discussed with family;other (see comments)   Yoli FALCON  -TM        Barriers to Rehab cognitive status  -TM        Patient's Goals for Discharge patient did not state  -TM        Family Goals for Discharge family did not state  -TM           Pain Assessment    Additional Documentation Pain Scale: FACES Pre/Post-Treatment (Group)  -TM           Pain Scale: Numbers Pre/Post-Treatment    Pain Location - Side Left  -TM        Pain Location - Orientation upper  -TM        Pain Location other (see comments)   Leg, s/p femur fx and sx  -TM           Pain Scale: FACES Pre/Post-Treatment    Pain: FACES Scale, Pretreatment 0-->no hurt  -TM        Pain: FACES Scale, Post-Treatment 4-->hurts little more  -TM           Oral Motor and Function    Dentition Assessment upper dentures/partial in place;lower dentures/partial in place  -TM        Secretion Management WNL/WFL  -TM        Mucosal Quality moist, healthy  -TM         Volitional Cough reduced respiratory support  -TM           Oral Musculature and Cranial Nerve Assessment    Oral Motor General Assessment generalized oral motor weakness  -TM        Mandibular Impairment Detail, Cranial Nerve V (Trigeminal) reduced mandibular ROM;reduced strength bilaterally  -TM        Oral Labial or Buccal Impairment, Detail, Cranial Nerve VII (Facial): reduced ROM;reduced strength bilaterally  -TM        Lingual Impairment, Detail. Cranial Nerves IX, XII (Glossopharyngeal and Hypoglossal) reduced lingual ROM;reduced strength  -TM           General Eating/Swallowing Observations    Respiratory Support Currently in Use nasal cannula  -TM        Eating/Swallowing Skills fed by SLP;self-fed  -TM        Positioning During Eating semi-reclined;other (see comments)   In bed, unable to tolerate 90 degree position given pain  -TM        Utensils Used spoon;straw  -TM        Consistencies Trialed soft textures;regular textures;thin liquids  -TM           Respiratory    Respiratory Status WFL  -TM           Clinical Swallow Eval    Oral Prep Phase WFL  -TM        Oral Transit impaired  -TM        Oral Residue WFL  -TM        Pharyngeal Phase WFL  -TM        Esophageal Phase unremarkable  -TM        Clinical Swallow Evaluation Summary Bedside swallow eval completed. Pt was assessed during consumption of breakfast tray, which consisted to mechanical soft, regular solid, and thin liquids. Semi-reclined position is maximal level of upright positioning pt was able to tolerate secondary to pain. RN arrived during mid portion of eval and provided PO pain med. Pt was fed each of the presented diet consistencies per SLP, yet often self fed when consuming liquids. Pt was noted to have decreased rotary chew, yet functional, with prolonged oral manipulation of the bolus, though felt to be functional for the pt at this time. This also resulted in delayed oral transit. No noted concerns with laryngeal elevation. Mild  [facility] at times, lingually, without concern for pocketing of residue in the sulci bilaterally. Multiple swallows noted with thin liquids when consumed via straw, yet it must be noted that pt appeared to consume increased bolus size. No overt s/s of aspiration observed. RN ascultated lungs at the bedside at time of eval, reporting no concerns. Educated daughter that ST feels continued services are no longer warranted, as swallow fx appeared to be WFL at time of eval. However, cannot fully r/o aspiration with PO intake. MD to re-consult if change or new concerns.   -TM           Oral Prep Concerns    Oral Prep Concerns prolonged mastication  -TM        Prolonged Mastication mechanical soft;regular consistencies  -TM           Oral Transit Concerns    Oral Transit Concerns delayed initiation of bolus transit  -TM        Delayed Intiation of Bolus Transit mechanical soft;regular consistencies  -TM           Clinical Impression    SLP Swallowing Diagnosis mild;functional oral phase;functional pharyngeal phase  -TM        Functional Impact no impact on function  -TM        Swallow Criteria for Skilled Therapeutic Interventions Met baseline status;no problems identified which require skilled intervention  -TM           Recommendations    Therapy Frequency (Swallow) evaluation only  -TM        SLP Diet Recommendation regular textures;thin liquids  -TM        Recommended Diagnostics reassess via clinical swallow evaluation;other (see comments)   To be ordered per MD if change or new concerns.  -TM        Recommended Precautions and Strategies upright posture during/after eating;small bites of food and sips of liquid  -TM        SLP Rec. for Method of Medication Administration meds whole;with thin liquids  -TM        Monitor for Signs of Aspiration yes;cough;gurgly voice;throat clearing;upper respiratory;pneumonia;right lower lobe infiltrates  -TM        Anticipated Dischage  Disposition skilled nursing facility  -TM           Swallow Goals (SLP)    Oral Nutrition/Hydration Goal Selection (SLP) oral nutrition/hydration, SLP goal 1  -TM           Oral Nutrition/Hydration Goal 1 (SLP)    Oral Nutrition/Hydration Goal 1, SLP Pt will tolerate LRD without overt s/s of aspiration.   -TM        Time Frame (Oral Nutrition/Hydration Goal 1, SLP) by discharge  -TM        Barriers (Oral Nutrition/Hydration Goal 1, SLP) Pain  -TM        Progress/Outcomes (Oral Nutrition/Hydration Goal 1, SLP) goal ongoing  -TM          User Key  (r) = Recorded By, (t) = Taken By, (c) = Cosigned By    Initials Name Effective Dates    TM Viviana Marsh, CCC-SLP 08/02/16 -         EDUCATION  The patient has been educated in the following areas:   Dysphagia (Swallowing Impairment).    SLP Recommendation and Plan  SLP Swallowing Diagnosis: mild, functional oral phase, functional pharyngeal phase  SLP Diet Recommendation: regular textures, thin liquids  Recommended Precautions and Strategies: upright posture during/after eating, small bites of food and sips of liquid     Monitor for Signs of Aspiration: yes, cough, gurgly voice, throat clearing, upper respiratory, pneumonia, right lower lobe infiltrates  Recommended Diagnostics: reassess via clinical swallow evaluation, other (see comments) (To be ordered per MD if change or new concerns.)  Swallow Criteria for Skilled Therapeutic Interventions Met: baseline status, no problems identified which require skilled intervention  Anticipated Dischage Disposition: skilled nursing facility     Therapy Frequency (Swallow): evaluation only          Plan of Care Reviewed With: daughter, other (see comments) (Yoli FALCON)  Plan of Care Review  Plan of Care Reviewed With: daughter, other (see comments) (Yoli FALCON)  Progress:  (Eval)  Outcome Summary: Bedside swallow eval completed. Pt was assessed during consumption of breakfast tray, which consisted to mechanical soft, regular solid,  and thin liquids. Semi-reclined position is maximal level of upright positioning pt was able to tolerate secondary to pain. RN arrived during mid portion of eval and provided PO pain med. Pt was fed each of the presented diet consistencies per SLP, yet often self fed when consuming liquids. Pt was noted to have decreased rotary chew, yet functional, with prolonged oral manipulation of the bolus, though felt to be functional for the pt at this time. This also resulted in delayed oral transit. No noted concerns with laryngeal elevation. Mild oral residue following mech soft and solid trials at times, lingually, without concern for pocketing of residue in the sulci bilaterally. Multiple swallows noted with thin liquids when consumed via straw, yet it must be noted that pt appeared to consume increased bolus size. No overt s/s of aspiration observed. RN ascultated lungs at the bedside at time of eval, reporting no concerns. Educated daughter that ST feels continued services are no longer warranted, as swallow fx appeared to be WFL at time of eval. However, cannot fully r/o aspiration with PO intake. MD to re-consult if change or new concerns.          SLP GOALS     Row Name 11/04/18 0900             Oral Nutrition/Hydration Goal 1 (SLP)    Oral Nutrition/Hydration Goal 1, SLP Pt will tolerate LRD without overt s/s of aspiration.   -TM      Time Frame (Oral Nutrition/Hydration Goal 1, SLP) by discharge  -TM      Barriers (Oral Nutrition/Hydration Goal 1, SLP) Pain  -TM      Progress/Outcomes (Oral Nutrition/Hydration Goal 1, SLP) goal ongoing  -TM        User Key  (r) = Recorded By, (t) = Taken By, (c) = Cosigned By    Initials Name Provider Type    TM Viviana Marsh CCC-SLP Speech and Language Pathologist             SLP Outcome Measures (last 72 hours)      SLP Outcome Measures     Row Name 11/04/18 0928             SLP Outcome Measures    Outcome Measure Used? Adult NOMS  -TM         Adult FCM Scores    FCM Chosen  Swallowing  -TM      Swallowing FCM Score 6  -TM        User Key  (r) = Recorded By, (t) = Taken By, (c) = Cosigned By    Initials Name Effective Dates    TM Viviana Marsh CCC-SLP 08/02/16 -            Time Calculation:         Time Calculation- SLP     Row Name 11/04/18 0955             Time Calculation- SLP    SLP Start Time 0900  -TM      SLP Stop Time 0955  -TM      SLP Time Calculation (min) 55 min  -TM      SLP Received On 11/04/18  -TM      SLP Goal Re-Cert Due Date 11/14/18  -TM        User Key  (r) = Recorded By, (t) = Taken By, (c) = Cosigned By    Initials Name Provider Type    TM Viviana Marsh CCC-SLP Speech and Language Pathologist          Therapy Charges for Today     Code Description Service Date Service Provider Modifiers Qty    30183196593 HC ST SWALLOWING CURRENT STATUS 11/4/2018 Viviana Marsh CCC-SLP GN, CI 1    20583527894 HC ST SWALLOWING PROJECTED 11/4/2018 Viviana Marsh CCC-SLP GN, CI 1    [id number] HC ST SWALLOWING DISCHARGE 11/4/2018 Viviana Marsh CCC-SLP GN, CI 1    [id number] HC ST EVAL ORAL PHARYNG SWALLOW 4 11/4/2018 Viviana Marsh CCC-SLP GN 1          SLP G-Codes  SLP NOMS Used?: Yes  Functional Limitations: Swallowing  Swallow Current Status (): At least 1 percent but less than 20 percent impaired, limited or restricted  Swallow Goal Status (): At least 1 percent but less than 20 percent impaired, limited or restricted  Swallow Discharge Status (): At least 1 percent but less than 20 percent impaired, limited or restricted    JANAE Mcmillan  11/4/2018

## 2018-11-04 NOTE — PLAN OF CARE
Problem: Skin Injury Risk (Adult)  Goal: Skin Health and Integrity  Outcome: Ongoing (interventions implemented as appropriate)      Problem: Patient Care Overview  Goal: Plan of Care Review  Outcome: Ongoing (interventions implemented as appropriate)   11/04/18 9824   Coping/Psychosocial   Plan of Care Reviewed With patient   Plan of Care Review   Progress no change   OTHER   Outcome Summary Pt remains disoriented to place and situation. Pt severe pain with movement. Pt drowsy most of shift pt waking to jerking movements and moaning out in pain. Pt med as ordered. P left hip drsg remains in place, drsg to knee area removed per pt, drsg was replaced although pt cont to take back off. No drsg to the area at this time. Pt cont to keep L leg turned out sanchez pt instructed to straighten leg.       Problem: Fall Risk (Adult)  Goal: Absence of Fall  Outcome: Ongoing (interventions implemented as appropriate)      Problem: Nutrition, Imbalanced: Inadequate Oral Intake (Adult)  Goal: Improved Oral Intake  Outcome: Ongoing (interventions implemented as appropriate)    Goal: Prevent Further Weight Loss  Outcome: Ongoing (interventions implemented as appropriate)      Problem: Hip Arthroplasty (Total, Partial) (Adult)  Goal: Signs and Symptoms of Listed Potential Problems Will be Absent, Minimized or Managed (Hip Arthroplasty)  Outcome: Ongoing (interventions implemented as appropriate)    Goal: Anesthesia/Sedation Recovery  Outcome: Ongoing (interventions implemented as appropriate)

## 2018-11-04 NOTE — PROGRESS NOTES
[facility] Medicine Services  INPATIENT PROGRESS NOTE    Patient Name: Burke Reinoso Jr.  Date of Admission: 11/1/2018  Today's Date: 11/04/18  Length of Stay: 2  Primary Care Physician: Mg Colmenares MD    Subjective   Chief Complaint: left hip pain    HPI   Currently laying in bed, family at bedside.  She was here all night and stated that his night was ok considering.  He would have occasional yell in pain or jerky from muscle spasms.  He is currently laying in bed asking for his coffee.  He continues to be confused which is his baseline.  He seems a little lethargic still likely secondary to medications.     Review of Systems   Constitutional: Positive for activity change and fatigue. Negative for appetite change, chills and fever.   HENT: Negative for hearing loss, nosebleeds, tinnitus and trouble swallowing.    Eyes: Negative for visual disturbance.   Respiratory: Negative for cough, chest tightness, shortness of breath and wheezing.    Cardiovascular: Negative for chest pain, palpitations and leg swelling.   Gastrointestinal: Negative for abdominal distention, abdominal pain, blood in stool, constipation, diarrhea, nausea and vomiting.   Endocrine: Negative for cold intolerance, heat intolerance, polydipsia, polyphagia and polyuria.   Genitourinary: Negative for decreased urine volume, difficulty urinating, dysuria, flank pain, frequency and hematuria.   Musculoskeletal: Positive for gait problem, joint swelling and myalgias. Negative for arthralgias.   Skin: Negative for rash.   Allergic/Immunologic: Negative for immunocompromised state.   Neurological: Positive for weakness. Negative for dizziness, syncope, light-headedness and headaches.   Hematological: Negative for adenopathy. Does not bruise/bleed easily.   Psychiatric/Behavioral: Positive for confusion. Negative for sleep disturbance. The patient is not nervous/anxious.       All pertinent negatives and  positives are as above. All other systems have been reviewed and are negative unless otherwise stated.     Objective    Temp:  [97.7 °F (36.5 °C)-98.9 °F (37.2 °C)] 98 °F (36.7 °C)  Heart Rate:  [] 103  Resp:  [14-20] 18  BP: ()/() 95/73     Physical Exam   Constitutional: He appears well-developed and well-nourished.   Laying in bed. NAD.   HENT:   Head: Normocephalic and atraumatic.   Eyes: Pupils are equal, round, and reactive to light. Conjunctivae and EOM are normal.   Neck: Neck supple. No JVD present. No thyromegaly present.   Cardiovascular: Normal rate, regular rhythm, normal heart sounds and intact distal pulses.  Exam reveals no gallop and no friction rub.    No murmur heard.  Pulmonary/Chest: Effort normal and breath sounds normal. No respiratory distress. He has no wheezes. He has no rales. He exhibits no tenderness.   Abdominal: Soft. Bowel sounds are normal. He exhibits no distension. There is no tenderness. There is no rebound and no guarding.   Genitourinary:   Genitourinary Comments: [place].    Musculoskeletal: Normal range of motion. He exhibits no edema, tenderness or deformity.   Bruising noted to left hip/groin   Lymphadenopathy:     He has no cervical adenopathy.   Neurological: He is alert. He is disoriented.   Skin: Skin is warm and dry. No rash noted.   Psychiatric: He has a normal mood and affect. His behavior is normal. Judgment and thought content normal.   Nursing note and vitals reviewed.    Results Review:  I have reviewed the labs, radiology results, and diagnostic studies.    Laboratory Data:     Results from last 7 days  Lab Units 11/04/18  0755 11/03/18  1616 11/03/18  0749  11/01/18  2337   WBC 10*3/mm3 14.69*  --  10.63  --  14.26*   HEMOGLOBIN g/dL 9.2* 9.5* 8.9*  8.9*  < > 8.7*   HEMATOCRIT % 27.9* 28.6* 26.8*  26.8*  < > 26.1*   PLATELETS 10*3/mm3 337  --  339  --  330   < > = values in this interval not displayed.     Results from last 7 days  Lab Units  11/03/18  0749 11/02/18 0440 11/01/18 2337   SODIUM mmol/L 135 135 135   POTASSIUM mmol/L 3.8 4.4 4.6   CHLORIDE mmol/L 97* 94* 94*   CO2 mmol/L 28.0 30.0 31.0   BUN mg/dL 25* 25* 26*   CREATININE mg/dL 0.82 0.81 0.87   CALCIUM mg/dL 8.5 8.5 8.6   BILIRUBIN mg/dL  --   --  1.2*   ALK PHOS U/L  --   --  80   ALT (SGPT) U/L  --   --  37   AST (SGOT) U/L  --   --  47*   GLUCOSE mg/dL 99 110* 129*       Results from last 7 days  Lab Units 11/03/18  0749 11/02/18 0440 11/01/18 2337   INR  1.26* 1.46* 1.46*     Radiology Data:   Imaging Results (last 24 hours)     Procedure Component Value Units Date/Time    XR Femur 2 View Left [[id number]] Collected:  11/03/18 1254     Updated:  11/03/18 1259    Narrative:       EXAMINATION:   XR FEMUR 2 VW LEFT-  11/3/2018 12:54 PM CDT     HISTORY: Fractured left femur     9 images are obtained in the operating room.     Orthopedic pins present in the left femoral head and neck. Femoral nail  is present. Fracture fragments are relatively aligned. Cerclage wires  are present.     3 minutes 12 seconds of fluoroscopic time was used during this procedure  This report was finalized on 11/03/2018 12:56 by Dr. Donis Andrews MD.    FL C Arm During Surgery [[id number]] Updated:  11/03/18 1247        I have reviewed the patient's current medications.     Assessment/Plan     Assessment:  1.  Acute traumatic comminuted periprosthetic fracture along the proximal diaphysis of the femur  2.  Frequent falls  3.  Recent left intertrochanteric hip fracture with trochanteric fixation nailing on 10/27/18  4.  Acute blood loss anemia from recent surgery   5.  Acutely anticoagulated with Coumadin for DVT prophylaxis secondary to #3  6.  Constipation, chronic  7.  Mild protein malnutrition   8.  Essential hypertension   9.  Leukocytosis, suspected reactive    Plan:  1.  POD #1 From removal of pre-existing trochanteric fixation nail and placement of long trochanteric fixation nail left femur and cabling of  femoral shaft fracture  2.  PT/OT  3.  Transfused 4 units PRBC and 4 units FFP total  4.  Bowel regimen   5.  CBC and BMP in AM  6.  SCDs for DVT prophylaxis  7.  Nutrition consult  8.  PT/INR in AM  9.  Chest xray normal  10.  Speech therapy to see, concern for currently diet consistency    Discharge Planning: I expect the patient to be discharged to [facility] in ? days.    Jacek Kinsey, APRN   11/04/18   9:08 AM

## 2018-11-04 NOTE — PROGRESS NOTES
Patient is postoperative day 1 for a re-fixation of a severe left femur fracture.  He appears stable.  His hemoglobin is 9.2.  He is somewhat confused.  Plan left ovary slow with physical therapy bed to chair only do not believe he is going be able to protect his weightbearing

## 2018-11-04 NOTE — THERAPY EVALUATION
Acute Care - Physical Therapy Initial Evaluation  [facility]     Patient Name: Burke Reinoso Jr.  : 1930  MRN: [id number]  Today's Date: 2018   Onset of Illness/Injury or Date of Surgery: 18  Date of Referral to PT: 18  Referring Physician: Dr. Colon      Admit Date: 2018    Visit Dx:     ICD-10-CM ICD-9-CM   1. Closed displaced comminuted fracture of shaft of left femur, initial encounter (CMS/Formerly [facility]) S72.352A 821.01   2. Dysphagia, unspecified type R13.10 787.20   3. Impaired mobility Z74.09 799.89     Patient Active Problem List   Diagnosis   • Closed displaced intertrochanteric fracture of left femur (CMS/HCC)   • Closed displaced comminuted fracture of shaft of left femur (CMS/HCC)     Past Medical History:   Diagnosis Date   • Arthritis    • Dementia    • GERD (gastroesophageal reflux disease)    • Hip fracture (CMS/Formerly [facility])    • Hypertension      Past Surgical History:   Procedure Laterality Date   • HIP TROCANTERIC NAILING WITH INTRAMEDULLARY HIP SCREW Left 10/27/2018    Procedure: HIP TROCANTERIC NAILING SHORT WITH INTRAMEDULLARY HIP SCREW;  Surgeon: Juan Colon MD;  Location: [facility];  Service: Orthopedics   • NO PAST SURGERIES N/A 10/27/2018    info from pt's daughter        PT ASSESSMENT (last 12 hours)      Physical Therapy Evaluation     Row Name 18 0817          PT Evaluation Time/Intention    Subjective Information complains of;weakness  -RAVI (r) CB (t) RAVI (c)     Document Type evaluation  -RAVI (r) CB (t) RAVI (c)     Mode of Treatment physical therapy  -RAVI (r) CB (t) RAVI (c)     Patient Effort fair  -RAVI (r) CB (t) RAVI (c)     Row Name 18 0817          General Information    Patient Profile Reviewed? yes  -RAVI (r) CB (t) RAVI (c)     Onset of Illness/Injury or Date of Surgery 18  -RAVI (r) CB (t) RAVI (c)     Referring Physician Dr. Colon  -RAVI (r) CB (t) RAVI (c)     Patient Observations lethargic;agree to therapy  -RAVI (r) CB (t) RAVI (c)     Patient/Family  Observations Daughter present  -RAVI (r) CB (t) RAVI (c)     General Observations of Patient Mendoza's, nasal cannula, IV line  -RAVI (r) CB (t) RAVI (c)     Prior Level of Function independent:;all household mobility  -RAVI (r) CB (t) RAVI (c)     Equipment Currently Used at Home none  -RAVI (r) CB (t) RAVI (c)     Pertinent History of Current Functional Problem acute fall w/ fx of L proximal femur, s/p removal of pre-existing trochanteric fixation nail #2 placement of long trochanteric fixation nail left femur and cabling of femoral shaft fracture 11/3. TDWB. HTN  -RAVI (r)  RAVI (c)     Existing Precautions/Restrictions fall;weight bearing   TTWB L LE  -RAVI     Limitations/Impairments safety/cognitive  -RAVI (r) CB (t) RAVI (c)     Risks Reviewed patient and family:;LOB;nausea/vomiting;dizziness;increased discomfort;change in vital signs;lines disloged  -RAVI (r) CB (t) RAVI (c)     Benefits Reviewed patient and family:;improve function;increase independence;increase strength;increase balance;decrease pain;decrease risk of DVT  -RAVI (r) CB (t) ARVI (c)     Barriers to Rehab cognitive status  -RAVI (r) CB (t) RAVI (c)     Row Name 11/04/18 0817          Relationship/Environment    Lives With facility resident  -RAVI (r) CB (t) RAVI (c)     Family Caregiver if Needed child(di), adult  -RAVI (r) CB (t) RAVI (c)     Row Name 11/04/18 0817          Resource/Environmental Concerns    Current Living Arrangements extended care facility  -RAVI (r) CB (t) RAVI (c)     Row Name 11/04/18 0817          Cognitive Assessment/Interventions    Additional Documentation Cognitive Assessment/Intervention (Group)  -RAVI (r) CB (t) RAVI (c)     Row Name 11/04/18 0817          Cognitive Assessment/Intervention- PT/OT    Affect/Mental Status (Cognitive) low arousal/lethargic  -RAVI (r) CB (t) RAVI (c)     Orientation Status (Cognition) oriented to;person;disoriented to;place;time;situation  -RAVI (r) CB (t) RAVI (c)     Follows Commands (Cognition) follows one step commands;50-74% accuracy  -RAVI  (r) CB (t) RAVI (c)     Cognitive Function (Cognitive) unable/difficult to assess  -RAVI (r) CB (t) RAVI (c)     Safety Deficit (Cognitive) ability to follow commands;safety precautions awareness;safety precautions follow-through/compliance  -RAVI (r) CB (t) RAVI (c)     Personal Safety Interventions muscle strengthening facilitated;supervised activity;fall prevention program maintained;nonskid shoes/slippers when out of bed  -RAVI     Row Name 11/04/18 0817          Safety Issues, Functional Mobility    Safety Issues Affecting Function (Mobility) ability to follow commands;insight into deficits/self awareness;other (see comments)   Daughter reports difficulty following previous WB status  -RAVI (r) CB (t) RAVI (c)     Impairments Affecting Function (Mobility) cognition;endurance/activity tolerance;pain;strength  -RAVI     Row Name 11/04/18 0817          Mobility Assessment/Treatment    Extremity Weight-bearing Status left lower extremity  -RAVI     Left Lower Extremity (Weight-bearing Status) toe touch weight-bearing (TTWB);touch down weight-bearing (TDWB)  -RAVI     Row Name 11/04/18 0817          Bed Mobility Assessment/Treatment    Bed Mobility Assessment/Treatment rolling left;rolling right  -RAVI (r) CB (t) RAVI (c)     Comment (Bed Mobility) Pt did not attempt, per nursing max A x2 for rolling  -RAVI (r)  RAVI (c)     Row Name 11/04/18 0817          General ROM    GENERAL ROM COMMENTS RLE PROM WFL  -RAVI     Row Name 11/04/18 0817          MMT (Manual Muscle Testing)    General MMT Comments unable to assess  -RAVI (r) CB (t) RAVI (c)     Row Name 11/04/18 0817          Sensory Assessment/Intervention    Sensory General Assessment no sensation deficits identified  -RAVI (r) CB (t) RAVI (c)     Row Name 11/04/18 0817          Pain Assessment    Additional Documentation Pain Scale: FACES Pre/Post-Treatment (Group)  -RAVI (r) CB (t) RAVI (c)     Row Name 11/04/18 0817          Pain Scale: FACES Pre/Post-Treatment    Pain: FACES Scale, Pretreatment  2-->hurts little bit  -RAVI (r) CB (t) RAVI (c)     Pain: FACES Scale, Post-Treatment 6-->hurts even more  -RAVI (r) CB (t) RAVI (c)     Row Name             Wound 10/27/18 1052 Left hip incision    Wound - Properties Group Date first assessed: 10/27/18  -AD Time first assessed: 1052  -AD Side: Left  -AD Location: hip  -AD Type: incision  -AD    Row Name             Wound 11/03/18 1237 Left leg incision    Wound - Properties Group Date first assessed: 11/03/18  -LC Time first assessed: 1237  -LC Side: Left  -LC Location: leg  -LC Type: incision  -LC    Row Name 11/04/18 0817          Coping    Observed Emotional State calm  -RAVI (r) CB (t) RAVI (c)     Row Name 11/04/18 0817          Plan of Care Review    Plan of Care Reviewed With patient;daughter  -RAVI (r) CB (t) RAVI (c)     Row Name 11/04/18 0817          Physical Therapy Clinical Impression    Date of Referral to PT 11/01/18  -RAVI (r) CB (t) RAVI (c)     Patient/Family Goals Statement (PT Clinical Impression) per daughter, increase safety  -RAVI (r) CB (t) RAVI (c)     Criteria for Skilled Interventions Met (PT Clinical Impression) yes;treatment indicated  -RAVI (r) CB (t) RAVI (c)     Pathology/Pathophysiology Noted (Describe Specifically for Each System) musculoskeletal  -RAVI (r) CB (t) RAVI (c)     Impairments Found (describe specific impairments) arousal, attention, and cognition;ROM;gait, locomotion, and balance  -RAVI     Rehab Potential (PT Clinical Summary) fair, will monitor progress closely  -RAVI (r) CB (t) RAVI (c)     Predicted Duration of Therapy (PT) until d/c  -RAVI (r) CB (t) RAVI (c)     Care Plan Review (PT) evaluation/treatment results reviewed;care plan/treatment goals reviewed;risks/benefits reviewed;current/potential barriers reviewed;patient/other agree to care plan  -RAVI (r) CB (t) RAVI (c)     Care Plan Review, Other Participant (PT Clinical Impression) daughter  -RAVI (r) CB (t) RAVI (c)     Row Name 11/04/18 0817          Physical Therapy Goals    Bed Mobility Goal Selection  (PT) bed mobility, PT goal 1;bed mobility, PT goal 2  -RAVI (r) CB (t) RAVI (c)     Transfer Goal Selection (PT) transfer, PT goal 1  -RAVI (r) CB (t) RAVI (c)     Row Name 11/04/18 0817          Bed Mobility Goal 1 (PT)    Activity/Assistive Device (Bed Mobility Goal 1, PT) rolling to right  -RAVI (r) CB (t) RAVI (c)     [place] Level/Cues Needed (Bed Mobility Goal 1, PT) minimum assist (75% or more patient effort)  -RAVI (r) CB (t) RAVI (c)     Time Frame (Bed Mobility Goal 1, PT) by discharge  -RAVI (r) CB (t) RAVI (c)     Barriers (Bed Mobility Goal 1, PT) pain, cognition  -RAVI (r) CB (t) RAVI (c)     Progress/Outcomes (Bed Mobility Goal 1, PT) goal ongoing  -RAVI (r) CB (t) RAVI (c)     Row Name 11/04/18 0817          Bed Mobility Goal 2 (PT)    Activity/Assistive Device (Bed Mobility Goal 2, PT) sit to supine/supine to sit  -RAVI (r) CB (t) RAVI (c)     [place] Level/Cues Needed (Bed Mobility Goal 2, PT) minimum assist (75% or more patient effort)  -RAVI (r) CB (t) RAVI (c)     Time Frame (Bed Mobility Goal 2, PT) by discharge  -RAVI (r) CB (t) RAVI (c)     Barriers (Bed Mobility Goal 2, PT) pain, cognition  -RAVI (r) CB (t) RAVI (c)     Progress/Outcomes (Bed Mobility Goal 2, PT) goal ongoing  -RAVI (r) CB (t) RAVI (c)     Row Name 11/04/18 0817          Transfer Goal 1 (PT)    Activity/Assistive Device (Transfer Goal 1, PT) sit-to-stand/stand-to-sit  -RAVI (r) CB (t) RAVI (c)     [place] Level/Cues Needed (Transfer Goal 1, PT) moderate assist (50-74% patient effort)  -RAVI (r) CB (t) RAVI (c)     Time Frame (Transfer Goal 1, PT) by discharge  -RAVI (r) CB (t) RAVI (c)     Barriers (Transfers Goal 1, PT) pain, cognition  -RAVI (r) CB (t) RAVI (c)     Progress/Outcome (Transfer Goal 1, PT) goal ongoing  -RAVI (r) CB (t) RAVI (c)     Row Name 11/04/18 0817          Positioning and Restraints    Pre-Treatment Position in bed  -RAVI (r) CB (t) RAVI (c)     Post Treatment Position bed  -RAVI (r) CB (t) RAVI (c)     In Bed fowlers;call light within reach;encouraged to call  for assist;exit alarm on;with family/caregiver;side rails up x3  -RAVI (r) CB (t) RAVI (c)       User Key  (r) = Recorded By, (t) = Taken By, (c) = Cosigned By    Initials Name Provider Type    Kalen Javier, PT DPT Physical Therapist    Luz Lopez, RN Registered Nurse    Fernando Morales RN Registered Nurse    Andrea Pierre, PT Student PT Student          Physical Therapy Education     Title: PT OT SLP Therapies (Active)     Topic: Physical Therapy (Active)     Point: Mobility training (Done)    Learning Progress Summary     Learner Status Readiness Method Response Comment Documented by    Patient Done Acceptance E VU Pt educated on use of bed rails to assist with rolling.  11/04/18 0817                      User Key     Initials Effective Dates Name Provider Type Discipline     10/08/18 -  Andrea Roe, PT Student PT Student PT                PT Recommendation and Plan  Anticipated Discharge Disposition (PT): skilled nursing facility  Therapy Frequency (PT Clinical Impression): 2 times/day  Outcome Summary/Treatment Plan (PT)  Anticipated Discharge Disposition (PT): skilled nursing facility  Plan of Care Reviewed With: patient, daughter  Outcome Summary: PT eval completed. Pt very lethargic, poorly cooperative this AM. Pt educated on use of bed rail to assist with rolling, but would not attempt. Per nursing, pt is max A  x2 for rolling secondary to pain and cognition difficulties. No sensation deficits noted, RLE PROM WFL.  Pt would benefit from skillled PT services to increase strength and safety with functional mobility. Currently anticipating d/c to SNF.          Outcome Measures     Row Name 11/04/18 0817             How much help from another person do you currently need...    Turning from your back to your side while in flat bed without using bedrails? 2  -RAVI (r) CB (t) RAVI (c)      Moving from lying on back to sitting on the side of a flat bed without bedrails? 2  -RAVI (r) CB (t) RAVI  (c)      Moving to and from a bed to a chair (including a wheelchair)? 2  -RAVI (r) CB (t) RAVI (c)      Standing up from a chair using your arms (e.g., wheelchair, bedside chair)? 2  -RAVI (r) CB (t) RAVI (c)      Climbing 3-5 steps with a railing? 1  -RAVI (r) CB (t) RAVI (c)      To walk in hospital room? 2  -RAVI (r) CB (t) RAVI (c)      AM-PAC 6 Clicks Score 11  -RAVI (r) CB (t)         Functional Assessment    Outcome Measure Options AM-PAC 6 Clicks Basic Mobility (PT)  -RAVI (r) CB (t) RAVI (c)        User Key  (r) = Recorded By, (t) = Taken By, (c) = Cosigned By    Initials Name Provider Type    Kalen Javier, PT DPT Physical Therapist    Andrea Pierre, PT Student PT Student           Time Calculation:         PT Charges     Row Name 11/04/18 1105             Time Calculation    Start Time 0817  -RAVI (r) CB (t) RAVI (c)      Stop Time 0900  -RAVI (r) CB (t) RAVI (c)      Time Calculation (min) 43 min  -RAVI (r) CB (t)      PT Received On 11/04/18  -RAVI (r) CB (t) RAVI (c)      PT Goal Re-Cert Due Date 11/14/18  -RAVI (r) CB (t) RAVI (c)        User Key  (r) = Recorded By, (t) = Taken By, (c) = Cosigned By    Initials Name Provider Type    Kalen Javier, PT DPT Physical Therapist    Andrea Pierre, PT Student PT Student        Therapy Suggested Charges     Code   Minutes Charges    None           Therapy Charges for Today     Code Description Service Date Service Provider Modifiers Qty    78833700171 HC PT EVAL LOW COMPLEXITY 3 11/4/2018 Andrea Roe, PT Student GP 1          PT G-Codes  Outcome Measure Options: AM-PAC 6 Clicks Basic Mobility (PT)  AM-PAC 6 Clicks Score: 11  Functional Limitation: Mobility: Walking and moving around  Mobility: Walking and Moving Around Current Status (): At least 60 percent but less than 80 percent impaired, limited or restricted  Mobility: Walking and Moving Around Goal Status (): At least 40 percent but less than 60 percent impaired, limited or restricted      Andrea  Jyothi PT Student  11/4/2018

## 2018-11-04 NOTE — PLAN OF CARE
Problem: Skin Injury Risk (Adult)  Goal: Skin Health and Integrity  Outcome: Ongoing (interventions implemented as appropriate)      Problem: Patient Care Overview  Goal: Plan of Care Review  Outcome: Ongoing (interventions implemented as appropriate)   11/04/18 0340   Coping/Psychosocial   Plan of Care Reviewed With patient   Plan of Care Review   Progress no change   OTHER   Outcome Summary Patient c/o pain, prn pain meds given as ordered. dressing to left hip CDI, PPP, VSS, Safety maintained.       Problem: Fall Risk (Adult)  Goal: Absence of Fall  Outcome: Ongoing (interventions implemented as appropriate)      Problem: Nutrition, Imbalanced: Inadequate Oral Intake (Adult)  Goal: Improved Oral Intake  Outcome: Ongoing (interventions implemented as appropriate)    Goal: Prevent Further Weight Loss  Outcome: Ongoing (interventions implemented as appropriate)      Problem: Hip Arthroplasty (Total, Partial) (Adult)  Goal: Signs and Symptoms of Listed Potential Problems Will be Absent, Minimized or Managed (Hip Arthroplasty)  Outcome: Ongoing (interventions implemented as appropriate)

## 2018-11-05 LAB
ANION GAP SERPL CALCULATED.3IONS-SCNC: 10 MMOL/L (ref 4–13)
BASOPHILS # BLD AUTO: 0.06 10*3/MM3 (ref 0–0.2)
BASOPHILS NFR BLD AUTO: 0.4 % (ref 0–2)
BUN BLD-MCNC: 26 MG/DL (ref 5–21)
BUN/CREAT SERPL: 30.2 (ref 7–25)
CALCIUM SPEC-SCNC: 8.3 MG/DL (ref 8.4–10.4)
CHLORIDE SERPL-SCNC: 94 MMOL/L (ref 98–110)
CO2 SERPL-SCNC: 33 MMOL/L (ref 24–31)
CREAT BLD-MCNC: 0.86 MG/DL (ref 0.5–1.4)
DEPRECATED RDW RBC AUTO: 49.1 FL (ref 40–54)
EOSINOPHIL # BLD AUTO: 0.14 10*3/MM3 (ref 0–0.7)
EOSINOPHIL NFR BLD AUTO: 1 % (ref 0–4)
ERYTHROCYTE [DISTWIDTH] IN BLOOD BY AUTOMATED COUNT: 15.7 % (ref 12–15)
GFR SERPL CREATININE-BSD FRML MDRD: 84 ML/MIN/1.73
GLUCOSE BLD-MCNC: 102 MG/DL (ref 70–100)
HCT VFR BLD AUTO: 26.5 % (ref 40–52)
HGB BLD-MCNC: 8.8 G/DL (ref 14–18)
IMM GRANULOCYTES # BLD: 0.19 10*3/MM3 (ref 0–0.03)
IMM GRANULOCYTES NFR BLD: 1.4 % (ref 0–5)
INR PPP: 1.17 (ref 0.91–1.09)
LYMPHOCYTES # BLD AUTO: 0.64 10*3/MM3 (ref 0.72–4.86)
LYMPHOCYTES NFR BLD AUTO: 4.8 % (ref 15–45)
MCH RBC QN AUTO: 29 PG (ref 28–32)
MCHC RBC AUTO-ENTMCNC: 33.2 G/DL (ref 33–36)
MCV RBC AUTO: 87.5 FL (ref 82–95)
MONOCYTES # BLD AUTO: 0.69 10*3/MM3 (ref 0.19–1.3)
MONOCYTES NFR BLD AUTO: 5.1 % (ref 4–12)
NEUTROPHILS # BLD AUTO: 11.72 10*3/MM3 (ref 1.87–8.4)
NEUTROPHILS NFR BLD AUTO: 87.3 % (ref 39–78)
NRBC BLD MANUAL-RTO: 0 /100 WBC (ref 0–0)
PLATELET # BLD AUTO: 344 10*3/MM3 (ref 130–400)
PMV BLD AUTO: 9.9 FL (ref 6–12)
POTASSIUM BLD-SCNC: 3.2 MMOL/L (ref 3.5–5.3)
PROTHROMBIN TIME: 15.3 SECONDS (ref 11.9–14.6)
RBC # BLD AUTO: 3.03 10*6/MM3 (ref 4.8–5.9)
SODIUM BLD-SCNC: 137 MMOL/L (ref 135–145)
WBC NRBC COR # BLD: 13.44 10*3/MM3 (ref 4.8–10.8)

## 2018-11-05 PROCEDURE — 85025 COMPLETE CBC W/AUTO DIFF WBC: CPT | Performed by: NURSE PRACTITIONER

## 2018-11-05 PROCEDURE — 30233R1 TRANSFUSION OF NONAUTOLOGOUS PLATELETS INTO PERIPHERAL VEIN, PERCUTANEOUS APPROACH: ICD-10-PCS | Performed by: ORTHOPAEDIC SURGERY

## 2018-11-05 PROCEDURE — 94799 UNLISTED PULMONARY SVC/PX: CPT

## 2018-11-05 PROCEDURE — 80048 BASIC METABOLIC PNL TOTAL CA: CPT | Performed by: NURSE PRACTITIONER

## 2018-11-05 PROCEDURE — 94760 N-INVAS EAR/PLS OXIMETRY 1: CPT

## 2018-11-05 PROCEDURE — 85610 PROTHROMBIN TIME: CPT | Performed by: NURSE PRACTITIONER

## 2018-11-05 PROCEDURE — 97110 THERAPEUTIC EXERCISES: CPT

## 2018-11-05 RX ORDER — FAMOTIDINE 20 MG/1
20 TABLET, FILM COATED ORAL DAILY
Status: DISCONTINUED | OUTPATIENT
Start: 2018-11-06 | End: 2018-11-13 | Stop reason: HOSPADM

## 2018-11-05 RX ORDER — POTASSIUM CHLORIDE 750 MG/1
40 CAPSULE, EXTENDED RELEASE ORAL 2 TIMES DAILY WITH MEALS
Status: COMPLETED | OUTPATIENT
Start: 2018-11-05 | End: 2018-11-05

## 2018-11-05 RX ADMIN — Medication 3 ML: at 21:40

## 2018-11-05 RX ADMIN — HYDROCHLOROTHIAZIDE 25 MG: 25 TABLET ORAL at 09:52

## 2018-11-05 RX ADMIN — CARBAMIDE PEROXIDE 6.5% 10 DROP: 6.5 LIQUID AURICULAR (OTIC) at 21:39

## 2018-11-05 RX ADMIN — QUETIAPINE FUMARATE 50 MG: 25 TABLET, FILM COATED ORAL at 21:39

## 2018-11-05 RX ADMIN — POLYETHYLENE GLYCOL 3350 17 G: 17 POWDER, FOR SOLUTION ORAL at 09:52

## 2018-11-05 RX ADMIN — POTASSIUM CHLORIDE 40 MEQ: 750 CAPSULE, EXTENDED RELEASE ORAL at 09:53

## 2018-11-05 RX ADMIN — FAMOTIDINE 20 MG: 10 INJECTION INTRAVENOUS at 09:52

## 2018-11-05 RX ADMIN — HYDROCODONE BITARTRATE AND ACETAMINOPHEN 1 TABLET: 5; 325 TABLET ORAL at 21:57

## 2018-11-05 RX ADMIN — Medication 3 ML: at 09:53

## 2018-11-05 RX ADMIN — POTASSIUM CHLORIDE 40 MEQ: 750 CAPSULE, EXTENDED RELEASE ORAL at 17:35

## 2018-11-05 NOTE — PLAN OF CARE
Problem: Skin Injury Risk (Adult)  Goal: Skin Health and Integrity  Outcome: Ongoing (interventions implemented as appropriate)      Problem: Patient Care Overview  Goal: Plan of Care Review  Outcome: Ongoing (interventions implemented as appropriate)   11/05/18 0017   Coping/Psychosocial   Plan of Care Reviewed With patient   Plan of Care Review   Progress no change   OTHER   Outcome Summary pt confused. picks at dressings. yells when turned. pain meds given as needed. incont. turn Q2 hours     Goal: Individualization and Mutuality  Outcome: Ongoing (interventions implemented as appropriate)    Goal: Discharge Needs Assessment  Outcome: Ongoing (interventions implemented as appropriate)    Goal: Interprofessional Rounds/Family Conf  Outcome: Ongoing (interventions implemented as appropriate)      Problem: Fall Risk (Adult)  Goal: Absence of Fall  Outcome: Ongoing (interventions implemented as appropriate)      Problem: Nutrition, Imbalanced: Inadequate Oral Intake (Adult)  Goal: Improved Oral Intake  Outcome: Ongoing (interventions implemented as appropriate)    Goal: Prevent Further Weight Loss  Outcome: Ongoing (interventions implemented as appropriate)      Problem: Hip Arthroplasty (Total, Partial) (Adult)  Goal: Signs and Symptoms of Listed Potential Problems Will be Absent, Minimized or Managed (Hip Arthroplasty)  Outcome: Ongoing (interventions implemented as appropriate)    Goal: Anesthesia/Sedation Recovery  Outcome: Ongoing (interventions implemented as appropriate)

## 2018-11-05 NOTE — DISCHARGE PLACEMENT REQUEST
"Referral for rehab placement. Please call Karen at [phone] after referral reviewed.   Thanks!      Burke Reinoso (88 y.o. Male)     Date of Birth Social Security Number Address Home Phone MRN    02/02/1930  [id number]  [place]  [place] [address] [phone] [id number]    [employer] Marital Status          [facility]        Admission Date Admission Type Admitting Provider Attending Provider Department, Room/Bed    11/1/18 Emergency Mingo Ramon DO Hancock, John C, DO [facility] 3A, 349/1    Discharge Date Discharge Disposition Discharge Destination                       Attending Provider:  Mingo Ramon DO    Allergies:  No Known Allergies    Isolation:  None   Infection:  None   Code Status:  No CPR    Ht:  182.9 cm (72\")   Wt:  72.8 kg (160 lb 6.4 oz)    Admission Cmt:  None   Principal Problem:  None                Active Insurance as of 11/1/2018     Primary Coverage     Payor Plan Insurance Group Employer/Plan Group    ANTHEM MEDICARE REPLACEMENT ANTHEM MEDICARE ADVANTAGE [id number]     Payor Plan Address Payor Plan Phone Number Effective From Effective To    [address] [phone] 1/1/2018     [facility] [address]       Subscriber Name Subscriber Birth Date Member ID       BURKE REINOSO JR. 2/2/1930 [id number]           Secondary Coverage     Payor Plan Insurance Group Employer/Plan Group    [place]      Payor Plan Address Payor Plan Phone Number Effective From Effective To    [address] [phone] 10/27/2018     [facility] [address]       Subscriber Name Subscriber Birth Date Member ID       BURKE REINOSO JR. 2/2/1930 [id number]                 Emergency Contacts      (Rel.) Home Phone Work Phone Mobile Phone    Holly Jarvis (Daughter) -- -- [phone]            Insurance Information                ANTHEM MEDICARE REPLACEMENT/ANTHEM MEDICARE ADVANTAGE Phone: [phone]    Subscriber: Kemar" Burke WARREN Jr. Subscriber#: [id number]    Group#: [id number] Precert#:         [place] Phone: [phone]    Subscriber: Reinoso Burke WARREN Jr. Subscriber#: [id number]    Group#:  Precert#:              History & Physical      Mario ValdezDO at 11/2/2018 12:56 AM                [facility] Medicine Admission      Date of Admission: 11/1/2018      Primary Care Physician: Mg Colmenares MD    Chief compliant: acute fall    HPI: This is a 88-year-old white male that has a history physical deconditioning and dementia.  The patient also is status post trochanteric fixation and narrowing of the left hip in the past week.  The patient was a discharge and sent to a nursing home in [facility].  The patient this evening had a fall that was unwitnessed.  The patient was brought in for evaluation.  In the ER the patient was found to have a severe fracture of the left proximal femur near the prosthesis.  Orthopedics was called and the patient will be nothing by mouth for further evaluation.    Past Medical History:   Past Medical History:   Diagnosis Date   • Arthritis    • Dementia    • GERD (gastroesophageal reflux disease)    • Hypertension        Past Surgical History:   Past Surgical History:   Procedure Laterality Date   • HIP TROCANTERIC NAILING WITH INTRAMEDULLARY HIP SCREW Left 10/27/2018    Procedure: HIP TROCANTERIC NAILING SHORT WITH INTRAMEDULLARY HIP SCREW;  Surgeon: Juan Colon MD;  Location: [facility];  Service: Orthopedics   • NO PAST SURGERIES N/A 10/27/2018    info from pt's daughter       Family History: History reviewed. No pertinent family history.    Social History:   Social History     Social History   • Marital status:      Social History Main Topics   • Smoking status: Former Smoker     Years: 20.00     Types: Cigarettes, Pipe   • Smokeless tobacco: Never Used      Comment: hAS  BEEN QUIT FOR 30 = YRS   • Alcohol use No   •  Drug use: No   • Sexual activity: Defer     Other Topics Concern   • Not on file       Allergies: No Known Allergies    Medications:   Prior to Admission medications    Medication Sig Start Date End Date Taking? Authorizing Provider   bisacodyl (DULCOLAX) 5 MG EC tablet Take 10 mg by mouth Daily As Needed for Constipation.    Gary Perez MD   carbamide peroxide (DEBROX) 6.5 % otic solution Administer 10 drops into both ears every night at bedtime.    Gary Perez MD   hydrochlorothiazide (HYDRODIURIL) 25 MG tablet Take 25 mg by mouth Daily.    Gary Perez MD   HYDROcodone-acetaminophen (NORCO) 5-325 MG per tablet Take 1 tablet by mouth Every 6 (Six) Hours As Needed for Moderate Pain  for up to 6 days. 10/31/18 11/6/18  Juan Colon MD   magnesium hydroxide (MILK OF MAGNESIA) 400 MG/5ML suspension Take 5 mL by mouth Daily As Needed for Constipation.    Gary Perez MD   polyethylene glycol (MIRALAX) packet Take 17 g by mouth Daily.    Gary Perez MD   QUEtiapine (SEROquel) 50 MG tablet Take 50 mg by mouth Every Night.    Gary Perez MD   vitamin B-12 (CYANOCOBALAMIN) 1000 MCG tablet Take 1,000 mcg by mouth Every 14 (Fourteen) Days. Fridays    Gary Perez MD   vitamin D (ERGOCALCIFEROL) 75833 units capsule capsule Take 50,000 Units by mouth 1 (One) Time Per Week. On Fridays    Gary Perez MD   warfarin (COUMADIN) 3 MG tablet 3mg po q day 10/31/18   Juan Colon MD       Review of Systems:    Full 12 point review systems is not obtainable/accurate as the patient is underlying dementia.    Physical Exam:    Temp:  [97.1 °F (36.2 °C)] 97.1 °F (36.2 °C)  Heart Rate:  [] 97  Resp:  [16] 16  BP: (122-152)/(49-88) 122/76    General:Patient is uncomfortable, no acute distress.  HEENT: Head: Normocephalic and atraumatic.   Eyes: Conjunctivae and EOM are normal. Pupils are equal, round, and reactive to light. Right eye exhibits no  discharge. Left eye exhibits no discharge.   Neck: Normal range of motion. Neck supple. No JVD present. No tracheal deviation present. No thyromegaly present.   heart: Normal rate, regular rhythm, normal heart sounds and intact distal pulses.  Exam reveals no gallop and no friction rub. No murmur heard.  Pulmonary: Effort normal and breath sounds normal. No stridor. No respiratory distress. He has no wheezes. No rales or tenderness.   Abdominal: Soft, Bowel sounds are normal. No distension and no mass. There is no tenderness. There is no rebound and no guarding. No hernia.   Musculoskeletal: The patient has an externally rotated left foot.  The patient has some swelling at the proximal left femur and hip region.   Lymph: No cervical adenopathy.   Skin: Skin is warm. No rash noted. Patient is not diaphoretic.       Nursing note and vitals reviewed.    Results Reviewed:  I have personally reviewed current lab, radiology, and data and agree with results.  Lab Results (last 24 hours)     Procedure Component Value Units Date/Time    Duarte Draw [[id number]] Collected:  11/01/18 2338    Specimen:  Blood Updated:  11/02/18 0045    Narrative:       The following orders were created for panel order Duarte Draw.  Procedure                               Abnormality         Status                     ---------                               -----------         ------                     Light Blue Top[[id number]]                                                              Green Top (Gel)[[id number]]                                                             Lavender Top[[id number]]                                                                Red Top[[id number]]                                          Final result                 Please view results for these tests on the individual orders.    Red Top [[id number]] Collected:  11/01/18 2338    Specimen:  Blood Updated:  11/02/18 0045     Extra Tube Hold for add-ons.     Comment:  Auto resulted.       CBC & Differential [[id number]] Collected:  11/01/18 2337    Specimen:  Blood Updated:  11/02/18 0006    Narrative:       The following orders were created for panel order CBC & Differential.  Procedure                               Abnormality         Status                     ---------                               -----------         ------                     CBC Auto Differential[[id number]]        Abnormal            Final result                 Please view results for these tests on the individual orders.    CBC Auto Differential [[id number]]  (Abnormal) Collected:  11/01/18 2337    Specimen:  Blood Updated:  11/02/18 0006     WBC 14.26 (H) 10*3/mm3      RBC 2.95 (L) 10*6/mm3      Hemoglobin 8.7 (L) g/dL      Hematocrit 26.1 (L) %      MCV 88.5 fL      MCH 29.5 pg      MCHC 33.3 g/dL      RDW 14.6 %      RDW-SD 46.7 fl      MPV 10.5 fL      Platelets 330 10*3/mm3      Neutrophil % 91.6 (H) %      Lymphocyte % 3.2 (L) %      Monocyte % 4.3 %      Eosinophil % 0.0 %      Basophil % 0.2 %      Immature Grans % 0.7 %      Neutrophils, Absolute 13.05 (H) 10*3/mm3      Lymphocytes, Absolute 0.46 (L) 10*3/mm3      Monocytes, Absolute 0.62 10*3/mm3      Eosinophils, Absolute 0.00 10*3/mm3      Basophils, Absolute 0.03 10*3/mm3      Immature Grans, Absolute 0.10 (H) 10*3/mm3      nRBC 0.0 /100 WBC     Comprehensive Metabolic Panel [[id number]]  (Abnormal) Collected:  11/01/18 2337    Specimen:  Blood Updated:  11/01/18 2357     Glucose 129 (H) mg/dL      BUN 26 (H) mg/dL      Creatinine 0.87 mg/dL      Sodium 135 mmol/L      Potassium 4.6 mmol/L      Chloride 94 (L) mmol/L      CO2 31.0 mmol/L      Calcium 8.6 mg/dL      Total Protein 5.9 (L) g/dL      Albumin 3.00 (L) g/dL      ALT (SGPT) 37 U/L      AST (SGOT) 47 (H) U/L      Alkaline Phosphatase 80 U/L      Total Bilirubin 1.2 (H) mg/dL      eGFR Non African Amer 83 mL/min/1.73      Globulin 2.9 gm/dL      A/G Ratio 1.0 (L) g/dL       BUN/Creatinine Ratio 29.9 (H)     Anion Gap 10.0 mmol/L     Narrative:       The MDRD GFR formula is only valid for adults with stable renal function between ages 18 and 70.    Protime-INR [[id number]]  (Abnormal) Collected:  11/01/18 2337    Specimen:  Blood Updated:  11/01/18 2355     Protime 18.2 (H) Seconds      INR 1.46 (H)        Imaging Results (last 24 hours)     Procedure Component Value Units Date/Time    XR Femur 2 View Left [[id number]] Updated:  11/02/18 0023          Assessment/Plan         Active Hospital Problems    Diagnosis   • Closed displaced comminuted fracture of shaft of left femur (CMS/HCC)       Assessment / Plan:  Acute mechanical fall with fracture of left proximal femur - the patient to be nothing by mouth, will consult orthopedic surgery, will treat pain with morphine.  We'll hold his anticoagulation.  His most recent INR is 1.46.     Anemia Unspecified- is likely from recent surgery from a hip fracture in the last week.  His hemoglobin on was around 9.2.  Is currently at 8.7.  We will continue to monitor.  I will order H&H twice per day to monitor.  We will type and screen and prepare her 1 unit of blood in case he drops below 7 and will transfuse at that point.    Anticoagulated on Coumadin - hold his warfarin in view of need for possible surgical intervention.  His most recent INR 1.46.  We will check daily INR.    Mild protein malnutrition - will consult nutrition.    Suspected reactive leukocytosis - I suspect this is likely secondary to his his acute fall.  We will repeat CBC to monitor.  Also we will order UA and chest x-ray to rule out acute infection.    Ongoing constipation - will obtain abdominal series, will add suppository and monitor.    Hypertension - blood pressure is 126/49.  Patient will be nothing by mouth.  If the patient continues to have an elevation in blood pressures will add additional when necessary IV antihypertensives with parameters.    Ethics - DNR  DVT  Prophylaxis- SCDs.    GI prophylaxis - IV PPI.    EMR Dragon/Transcription disclaimer:   Much of this encounter note is an electronic transcription/translation of spoken language to printed text. The electronic translation of spoken language may permit erroneous, or at times, nonsensical words or phrases to be inadvertently transcribed; Although I have reviewed the note for such errors, some may still exist.            This document has been electronically signed by Mario Valdez DO on November 2, 2018 12:56 AM                      Electronically signed by Mario Valdez DO at 11/2/2018  4:40 AM       Hospital Medications (active)       Dose Frequency Start End    bisacodyl (DULCOLAX) EC tablet 10 mg 10 mg Daily PRN 11/2/2018     Sig - Route: Take 2 tablets by mouth Daily As Needed for Constipation. - Oral    bisacodyl (DULCOLAX) suppository 10 mg 10 mg Daily 11/2/2018     Sig - Route: Insert 1 suppository into the rectum Daily. - Rectal    buffered lidocaine syringe 0.5 mL 0.5 mL Once As Needed 11/3/2018     Sig - Route: Inject 0.5 mL as directed 1 (One) Time As Needed (IV Start). - Injection    carbamide peroxide (DEBROX) 6.5 % otic solution 10 drop 10 drop Nightly 11/2/2018     Sig - Route: Administer 10 drops into both ears Every Night. - Both Ears    famotidine (PEPCID) injection 20 mg 20 mg Daily 11/2/2018     Sig - Route: Infuse 2 mL into a venous catheter Daily. - Intravenous    hydrochlorothiazide (HYDRODIURIL) tablet 25 mg 25 mg Daily 11/3/2018     Sig - Route: Take 1 tablet by mouth Daily. - Oral    HYDROcodone-acetaminophen (NORCO) 5-325 MG per tablet 1 tablet 1 tablet Every 4 Hours PRN 11/2/2018 11/12/2018    Sig - Route: Take 1 tablet by mouth Every 4 (Four) Hours As Needed for Moderate Pain . - Oral    midazolam (VERSED) injection 1 mg 1 mg Every 5 Minutes PRN 11/3/2018     Sig - Route: Infuse 1 mL into a venous catheter Every 5 (Five) Minutes As Needed for Anxiety (Max 4mg midazolam  "TOTAL). - Intravenous    Linked Group 1:  \"Or\" Linked Group Details        midazolam (VERSED) injection 2 mg 2 mg Every 5 Minutes PRN 11/3/2018     Sig - Route: Infuse 2 mL into a venous catheter Every 5 (Five) Minutes As Needed for Anxiety (Max 4mg midazolam TOTAL). - Intravenous    Linked Group 1:  \"Or\" Linked Group Details        naloxone (NARCAN) injection 0.4 mg 0.4 mg Every 5 Minutes PRN 11/2/2018     Sig - Route: Infuse 1 mL into a venous catheter Every 5 (Five) Minutes As Needed for Respiratory Depression. - Intravenous    Linked Group 2:  \"And\" Linked Group Details        polyethylene glycol (MIRALAX) powder 17 g 17 g Daily 11/3/2018     Sig - Route: Take 17 g by mouth Daily. - Oral    potassium chloride (MICRO-K) CR capsule 40 mEq 40 mEq 2 Times Daily With Meals 11/5/2018 11/6/2018    Sig - Route: Take 4 capsules by mouth 2 (Two) Times a Day With Meals. - Oral    QUEtiapine (SEROquel) tablet 50 mg 50 mg Nightly 11/2/2018     Sig - Route: Take 2 tablets by mouth Every Night. - Oral    sodium chloride 0.9 % flush 1-10 mL 1-10 mL As Needed 11/3/2018     Sig - Route: Infuse 1-10 mL into a venous catheter As Needed for Line Care. - Intravenous    sodium chloride 0.9 % flush 10 mL 10 mL As Needed 11/1/2018     Sig - Route: Infuse 10 mL into a venous catheter As Needed for Line Care. - Intravenous    Linked Group 3:  \"And\" Linked Group Details        sodium chloride 0.9 % flush 3 mL 3 mL Every 12 Hours Scheduled 11/2/2018     Sig - Route: Infuse 3 mL into a venous catheter Every 12 (Twelve) Hours. - Intravenous    sodium chloride 0.9 % flush 3 mL 3 mL Every 12 Hours Scheduled 11/3/2018     Sig - Route: Infuse 3 mL into a venous catheter Every 12 (Twelve) Hours. - Intravenous    sodium chloride 0.9 % flush 3-10 mL 3-10 mL As Needed 11/2/2018     Sig - Route: Infuse 3-10 mL into a venous catheter As Needed for Line Care. - Intravenous    Morphine sulfate (PF) injection 2 mg (Discontinued) 2 mg Every 4 Hours PRN " "11/2/2018 11/5/2018    Sig - Route: Infuse 1 mL into a venous catheter Every 4 (Four) Hours As Needed for Severe Pain . - Intravenous    Linked Group 2:  \"And\" Linked Group Details                 Operative/Procedure Notes (last 7 days) (Notes from 10/29/2018 11:43 AM through 11/5/2018 11:43 AM)      Juan Colon MD at 11/3/2018 10:00 AM        [facility]  OP NOTE    Patient Name: Burke Reinoso Jr.     Date of Procedure: 11/3/2018     PREOPERATIVE DIAGNOSIS: Fracture left femur periprosthetic     POSTOPERATIVE DIAGNOSIS: Same.     PROCEDURE PERFORMED: #1 removal of pre-existing trochanteric fixation nail #2 placement of long trochanteric fixation nail left femur and cabling of femoral shaft fracture     SURGEON: Juan Colon MD      ANESTHESIA: General.    PREPARATION: Routine.    STAFF: Circulator: Luz Medina RN  Radiology Technologist: Gina Ratliff  Scrub Person: Antionette Chang  Assistant: Nabila Mcfarland    ESTIMATED BLOOD LOSS: 500 mL    SPECIMENS: None    COMPLICATIONS: None    INDICATIONS: Burke Reinoso Jr. is a 88 y.o. male who is post a recent trochanteric fixation nailing of an intertrochanteric fracture of his left hip.  He only recently was discharged and he fell sustaining a comminuted fracture of the shaft of the femur around the previous implant. The indications, risks, and possible complications of the procedure were explained to the patient, who voiced understanding and wished to proceed with surgery.  At surgery and exchange nailing was carried out removing the previous trochanteric fixation nail and replacing it with a 380 x 11 mm long trochanteric fixation nail with a 130 mm blade plate proximally and a 60 mm and 45 mm static distal locking screw with 2 mid shaft cables placed .     PROCEDURE IN DETAIL: The patient was taken to the operating room and placed on the fracture  table insupineosition. After general anesthesia was obtained, the  left hip " and thigh were prepped and draped in a sterile mannePrevious staples were removed and the previous lateral incision was remade and carried down and through the deep fascia and extended distally.  2 smooth pins were then placed around the previous intertrochanteric fracture for temporary stabilization.  The blade plate was then unlocked and removed.  The distal locking screw was then removed.  A long ball-tipped guidewire was then placed down to the knee in the previous trochanteric fixation nail was removed.  The definitive long nail was then impacted into position and the blade plate was then impacted up the previous reamed opening into the head of the femur and locked into position so it could slide the outrigger was then removed.  Attention was then placed distally.  Using the fluoroscopy the 2 distal locking screws were placed from lateral to medial attention was then placed to the mid shaft area of the femur.  2 cables were then passed and secured the fracture alignment overall appeared appropriate wounds were then irrigated.  The deep fascia was closed with Vicryl suture in a running fashion followed by chromic on the subcutaneous incision followed by staples on the skinSterile dressings were applied. The patient tolerated the procedure well. Sponge and needle counts were correct. The patient was then awakened and extubated in the operating room and taken to the recovery room in good condition.    Juan Colon MD  Date: 11/3/2018 Time: 12:28 PM        Electronically signed by Juan Colon MD at 11/5/2018  7:14 AM          Physician Progress Notes (last 24 hours) (Notes from 11/4/2018 11:43 AM through 11/5/2018 11:43 AM)      Juan Colon MD at 11/5/2018  8:20 AM        Patient is 2 days post re-fixation of a left femur fracture.  He is very confused and we will progressed slowly due to the magnitude of this injury.  Tag plan continue with physical therapy very slowly    Electronically  "signed by Juan Colon MD at 11/5/2018  8:21 AM     Jacek Kinsey APRN at 11/5/2018  7:18 AM              [facility] Medicine Services  INPATIENT PROGRESS NOTE    Patient Name: Burke Reinoso Jr.  Date of Admission: 11/1/2018  Today's Date: 11/05/18  Length of Stay: 3  Primary Care Physician: Mg Colmenares MD    Subjective   Chief Complaint: left hip pain    HPI   Sitting up in bed about to eat breakfast with family assistance.  He states, \"I am old and tired.\"  That is all I could get him to say for the most part.  He is baseline dementia.  Family does not want him to go back to [place], they wish to seek placement elsewhere.     Review of Systems   Constitutional: Positive for activity change and fatigue. Negative for appetite change, chills and fever.   HENT: Negative for hearing loss, nosebleeds, tinnitus and trouble swallowing.    Eyes: Negative for visual disturbance.   Respiratory: Negative for cough, chest tightness, shortness of breath and wheezing.    Cardiovascular: Negative for chest pain, palpitations and leg swelling.   Gastrointestinal: Negative for abdominal distention, abdominal pain, blood in stool, constipation, diarrhea, nausea and vomiting.   Endocrine: Negative for cold intolerance, heat intolerance, polydipsia, polyphagia and polyuria.   Genitourinary: Negative for decreased urine volume, difficulty urinating, dysuria, flank pain, frequency and hematuria.   Musculoskeletal: Positive for gait problem, joint swelling and myalgias. Negative for arthralgias.   Skin: Negative for rash.   Allergic/Immunologic: Negative for immunocompromised state.   Neurological: Positive for weakness. Negative for dizziness, syncope, light-headedness and headaches.   Hematological: Negative for adenopathy. Does not bruise/bleed easily.   Psychiatric/Behavioral: Positive for confusion. Negative for sleep disturbance. The patient is not nervous/anxious.       All " pertinent negatives and positives are as above. All other systems have been reviewed and are negative unless otherwise stated.     Objective    Temp:  [98.1 °F (36.7 °C)-99.1 °F (37.3 °C)] 98.6 °F (37 °C)  Heart Rate:  [] 97  Resp:  [18-20] 20  BP: (104-131)/(49-82) 128/62     Physical Exam   Constitutional: He appears well-developed and well-nourished.   Sitting up in bed. NAD.   HENT:   Head: Normocephalic and atraumatic.   Eyes: Pupils are equal, round, and reactive to light. Conjunctivae and EOM are normal.   Neck: Neck supple. No JVD present. No thyromegaly present.   Cardiovascular: Normal rate, regular rhythm, normal heart sounds and intact distal pulses.  Exam reveals no gallop and no friction rub.    No murmur heard.  Pulmonary/Chest: Effort normal and breath sounds normal. No respiratory distress. He has no wheezes. He has no rales. He exhibits no tenderness.   Abdominal: Soft. Bowel sounds are normal. He exhibits no distension. There is no tenderness. There is no rebound and no guarding.   Genitourinary:   Genitourinary Comments: Incontinent     Musculoskeletal: Normal range of motion. He exhibits no edema, tenderness or deformity.   Bruising noted to left hip/groin   Lymphadenopathy:     He has no cervical adenopathy.   Neurological: He is alert. He is disoriented.   Skin: Skin is warm and dry. No rash noted.   Psychiatric: He has a normal mood and affect. His behavior is normal. Judgment and thought content normal.   Nursing note and vitals reviewed.    Results Review:  I have reviewed the labs, radiology results, and diagnostic studies.    Laboratory Data:     Results from last 7 days  Lab Units 11/05/18  0710 11/04/18  0755 11/03/18  1616 11/03/18  0749   WBC 10*3/mm3 13.44* 14.69*  --  10.63   HEMOGLOBIN g/dL 8.8* 9.2* 9.5* 8.9*  8.9*   HEMATOCRIT % 26.5* 27.9* 28.6* 26.8*  26.8*   PLATELETS 10*3/mm3 344 337  --  339        Results from last 7 days  Lab Units 11/05/18  0710 11/04/18  0755  11/03/18  0749  11/01/18  2337   SODIUM mmol/L 137 136 135  < > 135   POTASSIUM mmol/L 3.2* 3.5 3.8  < > 4.6   CHLORIDE mmol/L 94* 95* 97*  < > 94*   CO2 mmol/L 33.0* 30.0 28.0  < > 31.0   BUN mg/dL 26* 28* 25*  < > 26*   CREATININE mg/dL 0.86 0.95 0.82  < > 0.87   CALCIUM mg/dL 8.3* 8.1* 8.5  < > 8.6   BILIRUBIN mg/dL  --   --   --   --  1.2*   ALK PHOS U/L  --   --   --   --  80   ALT (SGPT) U/L  --   --   --   --  37   AST (SGOT) U/L  --   --   --   --  47*   GLUCOSE mg/dL 102* 94 99  < > 129*   < > = values in this interval not displayed.    Results from last 7 days  Lab Units 11/05/18  0710 11/03/18  0749 11/02/18  0440   INR  1.17* 1.26* 1.46*     Radiology Data:   Imaging Results (last 24 hours)     Procedure Component Value Units Date/Time    XR Femur 2 View Left [[id number]] Collected:  11/03/18 1254     Updated:  11/04/18 0939    Narrative:       EXAMINATION:   XR FEMUR 2 VW LEFT-  11/3/2018 12:54 PM CDT     HISTORY: Fractured left femur     9 images are obtained in the operating room.     Orthopedic pins present in the left femoral head and neck. Femoral nail  is present. Fracture fragments are relatively aligned. Cerclage wires  are present.     3 minutes 12 seconds of fluoroscopic time was used during this procedure  This report was finalized on 11/03/2018 12:56 by Dr. Donis Andrews MD.    FL C Arm During Surgery [[id number]] Collected:  11/03/18 1254     Updated:  11/04/18 0939    Narrative:       EXAMINATION:   XR FEMUR 2 VW LEFT-  11/3/2018 12:54 PM CDT     HISTORY: Fractured left femur     9 images are obtained in the operating room.     Orthopedic pins present in the left femoral head and neck. Femoral nail  is present. Fracture fragments are relatively aligned. Cerclage wires  are present.     3 minutes 12 seconds of fluoroscopic time was used during this procedure  This report was finalized on 11/03/2018 12:56 by Dr. Donis Andrews MD.        I have reviewed the patient's current medications.      Assessment/Plan     Assessment:  1.  Acute traumatic comminuted periprosthetic fracture along the proximal diaphysis of the femur on 11/3/18  2.  Frequent falls  3.  Recent left intertrochanteric hip fracture with trochanteric fixation nailing on 10/27/18  4.  Acute blood loss anemia from recent surgery   5.  Acutely anticoagulated with Coumadin for DVT prophylaxis secondary to #3  6.  Constipation, chronic  7.  Mild protein malnutrition   8.  Essential hypertension   9.  Leukocytosis, suspected reactive  10.  Hypokalemia     Plan:  1.  POD #2 From removal of pre-existing trochanteric fixation nail and placement of long trochanteric fixation nail left femur and cabling of femoral shaft fracture  2.  PT/OT, slowly  3.  Transfused 4 units PRBC and 4 units FFP total since admission   4.  Bowel regimen   5.  CBC and BMP in AM  6.  SCDs for DVT prophylaxis  7.   to see, wants to seek other placement.  Possibly swing bed.  8.  PT/INR in AM  9.  Chest xray normal  10.  Has not been out of bed since surgery, Dr. Colon wants slow therapy due to severity of the fracture.  11.  Replace K+    Discharge Planning: I expect the patient to be discharged to rehab/SNF in 1-2 days.    ALKA Doran   11/05/18   9:19 AM      Electronically signed by Jacek Kinsey APRN at 11/5/2018  9:23 AM       Consult Notes (last 24 hours) (Notes from 11/4/2018 11:43 AM through 11/5/2018 11:43 AM)     No notes of this type exist for this encounter.        Nutrition Notes (last 24 hours) (Notes from 11/4/2018 11:43 AM through 11/5/2018 11:43 AM)     No notes of this type exist for this encounter.           Physical Therapy Notes (last 24 hours) (Notes from 11/4/2018 11:43 AM through 11/5/2018 11:43 AM)      Mary Sylvester PTA at 11/5/2018 11:38 AM  Version 1 of 1         Problem: Patient Care Overview  Goal: Plan of Care Review  Outcome: Ongoing (interventions implemented as appropriate)   11/05/18 4182    Coping/Psychosocial   Plan of Care Reviewed With patient   Plan of Care Review   Progress no change   OTHER   Outcome Summary Pt agreeable to exercise in bed. Performed gentle ROM with LLE as tolerated. Noted pt has blister on L heel. Notified nsg. Also notified nsg of pt refusal to move R arm due to pain. Will continue to work with pt as tolerated.           Electronically signed by Mary Sylvester PTA at 11/5/2018 11:38 AM       Occupational Therapy Notes (last 24 hours) (Notes from 11/4/2018 11:43 AM through 11/5/2018 11:43 AM)     No notes of this type exist for this encounter.        Speech Language Pathology Notes (last 24 hours) (Notes from 11/4/2018 11:43 AM through 11/5/2018 11:43 AM)     No notes of this type exist for this encounter.        Respiratory Therapy Notes (last 24 hours) (Notes from 11/4/2018 11:43 AM through 11/5/2018 11:43 AM)     No notes of this type exist for this encounter.

## 2018-11-05 NOTE — PLAN OF CARE
Problem: Patient Care Overview  Goal: Plan of Care Review  Outcome: Ongoing (interventions implemented as appropriate)   11/05/18 7347   Coping/Psychosocial   Plan of Care Reviewed With patient   Plan of Care Review   Progress no change   OTHER   Outcome Summary Pt agreeable to exercise in bed. Performed gentle ROM with LLE as tolerated. Noted pt has blister on L heel. Notified nsg. Also notified nsg of pt refusal to move R arm due to pain. Will continue to work with pt as tolerated.

## 2018-11-05 NOTE — PLAN OF CARE
Problem: Skin Injury Risk (Adult)  Goal: Skin Health and Integrity  Outcome: Ongoing (interventions implemented as appropriate)      Problem: Patient Care Overview  Goal: Plan of Care Review  Outcome: Ongoing (interventions implemented as appropriate)   11/05/18 0033   Coping/Psychosocial   Plan of Care Reviewed With patient   Plan of Care Review   Progress no change   OTHER   Outcome Summary Pt alert and only oriented to self. Pt has pain in LLE and RUE but refuses to take pain medications. Family states pain meds cause increased confusion. Position adjusted and turned Q2. Pt has blister on L heel. Picture taken, elevated off of bed. Incontinent. Tolerating regular diet well. Dressing changed, incision intact. Dressing CDI. Continue to monitor. Safety maintained.      Goal: Individualization and Mutuality  Outcome: Ongoing (interventions implemented as appropriate)      Problem: Fall Risk (Adult)  Goal: Absence of Fall  Outcome: Ongoing (interventions implemented as appropriate)      Problem: Nutrition, Imbalanced: Inadequate Oral Intake (Adult)  Goal: Improved Oral Intake  Outcome: Ongoing (interventions implemented as appropriate)    Goal: Prevent Further Weight Loss  Outcome: Ongoing (interventions implemented as appropriate)      Problem: Hip Arthroplasty (Total, Partial) (Adult)  Goal: Signs and Symptoms of Listed Potential Problems Will be Absent, Minimized or Managed (Hip Arthroplasty)  Outcome: Ongoing (interventions implemented as appropriate)    Goal: Anesthesia/Sedation Recovery  Outcome: Ongoing (interventions implemented as appropriate)

## 2018-11-05 NOTE — PROGRESS NOTES
Continued Stay Note   [place]     Patient Name: Burke Reinoso Jr.  MRN: [id number]  Today's Date: 11/5/2018    Admit Date: 11/1/2018          Discharge Plan     Row Name 11/05/18 1554       Plan    Plan Comments BED OFFERED AT [facility] PENDING PRECERT PER KELSIE ([phone]). PRECERT WAS STARTED TODAY              Discharge Codes    No documentation.           KELBY Price

## 2018-11-05 NOTE — PROGRESS NOTES
Patient is 2 days post re-fixation of a left femur fracture.  He is very confused and we will progressed slowly due to the magnitude of this injury.  Tag plan continue with physical therapy very slowly

## 2018-11-05 NOTE — PROGRESS NOTES
"    [facility] Medicine Services  INPATIENT PROGRESS NOTE    Patient Name: Burke Reinoso Jr.  Date of Admission: 11/1/2018  Today's Date: 11/05/18  Length of Stay: 3  Primary Care Physician: Mg Colmenares MD    Subjective   Chief Complaint: left hip pain    HPI   Sitting up in bed about to eat breakfast with family assistance.  He states, \"I am old and tired.\"  That is all I could get him to say for the most part.  He is baseline dementia.  Family does not want him to go back to [place], they wish to seek placement elsewhere.     Review of Systems   Constitutional: Positive for activity change and fatigue. Negative for appetite change, chills and fever.   HENT: Negative for hearing loss, nosebleeds, tinnitus and trouble swallowing.    Eyes: Negative for visual disturbance.   Respiratory: Negative for cough, chest tightness, shortness of breath and wheezing.    Cardiovascular: Negative for chest pain, palpitations and leg swelling.   Gastrointestinal: Negative for abdominal distention, abdominal pain, blood in stool, constipation, diarrhea, nausea and vomiting.   Endocrine: Negative for cold intolerance, heat intolerance, polydipsia, polyphagia and polyuria.   Genitourinary: Negative for decreased urine volume, difficulty urinating, dysuria, flank pain, frequency and hematuria.   Musculoskeletal: Positive for gait problem, joint swelling and myalgias. Negative for arthralgias.   Skin: Negative for rash.   Allergic/Immunologic: Negative for immunocompromised state.   Neurological: Positive for weakness. Negative for dizziness, syncope, light-headedness and headaches.   Hematological: Negative for adenopathy. Does not bruise/bleed easily.   Psychiatric/Behavioral: Positive for confusion. Negative for sleep disturbance. The patient is not nervous/anxious.       All pertinent negatives and positives are as above. All other systems have been reviewed and are negative unless otherwise " stated.     Objective    Temp:  [98.1 °F (36.7 °C)-99.1 °F (37.3 °C)] 98.6 °F (37 °C)  Heart Rate:  [] 97  Resp:  [18-20] 20  BP: (104-131)/(49-82) 128/62     Physical Exam   Constitutional: He appears well-developed and well-nourished.   Sitting up in bed. NAD.   HENT:   Head: Normocephalic and atraumatic.   Eyes: Pupils are equal, round, and reactive to light. Conjunctivae and EOM are normal.   Neck: Neck supple. No JVD present. No thyromegaly present.   Cardiovascular: Normal rate, regular rhythm, normal heart sounds and intact distal pulses.  Exam reveals no gallop and no friction rub.    No murmur heard.  Pulmonary/Chest: Effort normal and breath sounds normal. No respiratory distress. He has no wheezes. He has no rales. He exhibits no tenderness.   Abdominal: Soft. Bowel sounds are normal. He exhibits no distension. There is no tenderness. There is no rebound and no guarding.   Genitourinary:   Genitourinary Comments: Incontinent     Musculoskeletal: Normal range of motion. He exhibits no edema, tenderness or deformity.   Bruising noted to left hip/groin   Lymphadenopathy:     He has no cervical adenopathy.   Neurological: He is alert. He is disoriented.   Skin: Skin is warm and dry. No rash noted.   Psychiatric: He has a normal mood and affect. His behavior is normal. Judgment and thought content normal.   Nursing note and vitals reviewed.    Results Review:  I have reviewed the labs, radiology results, and diagnostic studies.    Laboratory Data:     Results from last 7 days  Lab Units 11/05/18  0710 11/04/18  0755 11/03/18  1616 11/03/18  0749   WBC 10*3/mm3 13.44* 14.69*  --  10.63   HEMOGLOBIN g/dL 8.8* 9.2* 9.5* 8.9*  8.9*   HEMATOCRIT % 26.5* 27.9* 28.6* 26.8*  26.8*   PLATELETS 10*3/mm3 344 337  --  339        Results from last 7 days  Lab Units 11/05/18  0710 11/04/18  0755 11/03/18  0749  11/01/18  2337   SODIUM mmol/L 137 136 135  < > 135   POTASSIUM mmol/L 3.2* 3.5 3.8  < > 4.6   CHLORIDE  mmol/L 94* 95* 97*  < > 94*   CO2 mmol/L 33.0* 30.0 28.0  < > 31.0   BUN mg/dL 26* 28* 25*  < > 26*   CREATININE mg/dL 0.86 0.95 0.82  < > 0.87   CALCIUM mg/dL 8.3* 8.1* 8.5  < > 8.6   BILIRUBIN mg/dL  --   --   --   --  1.2*   ALK PHOS U/L  --   --   --   --  80   ALT (SGPT) U/L  --   --   --   --  37   AST (SGOT) U/L  --   --   --   --  47*   GLUCOSE mg/dL 102* 94 99  < > 129*   < > = values in this interval not displayed.    Results from last 7 days  Lab Units 11/05/18  0710 11/03/18  0749 11/02/18  0440   INR  1.17* 1.26* 1.46*     Radiology Data:   Imaging Results (last 24 hours)     Procedure Component Value Units Date/Time    XR Femur 2 View Left [[id number]] Collected:  11/03/18 1254     Updated:  11/04/18 0939    Narrative:       EXAMINATION:   XR FEMUR 2 VW LEFT-  11/3/2018 12:54 PM CDT     HISTORY: Fractured left femur     9 images are obtained in the operating room.     Orthopedic pins present in the left femoral head and neck. Femoral nail  is present. Fracture fragments are relatively aligned. Cerclage wires  are present.     3 minutes 12 seconds of fluoroscopic time was used during this procedure  This report was finalized on 11/03/2018 12:56 by Dr. Donis Andrews MD.    FL C Arm During Surgery [[id number]] Collected:  11/03/18 1254     Updated:  11/04/18 0939    Narrative:       EXAMINATION:   XR FEMUR 2 VW LEFT-  11/3/2018 12:54 PM CDT     HISTORY: Fractured left femur     9 images are obtained in the operating room.     Orthopedic pins present in the left femoral head and neck. Femoral nail  is present. Fracture fragments are relatively aligned. Cerclage wires  are present.     3 minutes 12 seconds of fluoroscopic time was used during this procedure  This report was finalized on 11/03/2018 12:56 by Dr. Donis Andrews MD.        I have reviewed the patient's current medications.     Assessment/Plan     Assessment:  1.  Acute traumatic comminuted periprosthetic fracture along the proximal diaphysis of  the femur on 11/3/18  2.  Frequent falls  3.  Recent left intertrochanteric hip fracture with trochanteric fixation nailing on 10/27/18  4.  Acute blood loss anemia from recent surgery   5.  Acutely anticoagulated with Coumadin for DVT prophylaxis secondary to #3  6.  Constipation, chronic  7.  Mild protein malnutrition   8.  Essential hypertension   9.  Leukocytosis, suspected reactive  10.  Hypokalemia     Plan:  1.  POD #2 From removal of pre-existing trochanteric fixation nail and placement of long trochanteric fixation nail left femur and cabling of femoral shaft fracture  2.  PT/OT, slowly  3.  Transfused 4 units PRBC and 4 units FFP total since admission   4.  Bowel regimen   5.  CBC and BMP in AM  6.  SCDs for DVT prophylaxis  7.   to see, wants to seek other placement.  Possibly swing bed.  8.  PT/INR in AM  9.  Chest xray normal  10.  Has not been out of bed since surgery, Dr. Colon wants slow therapy due to severity of the fracture.  11.  Replace K+    Discharge Planning: I expect the patient to be discharged to rehab/SNF in 1-2 days.    ALKA Doran   11/05/18   9:19 AM     I personally evaluated and examined the patient in conjunction with ALKA Ellis and agree with the assessment, treatment plan, and disposition of the patient as recorded by her. My history, exam, and further recommendations are:     He underwent removal of his pre-existing trochanteric fixation now on 11/3.  He then had placement of a longer trochanteric fixation on the same day.    Postoperative (on pre-existing) anemia stable.  He has received blood and FFP since presentation.    Currently not on any chemical DVT prophylaxis.    Patient and family request swing bed placement rather than returning to his prior SNF ([facility]).     Mingo Ramon,   11/05/18  1:38 PM

## 2018-11-05 NOTE — THERAPY TREATMENT NOTE
Acute Care - Physical Therapy Treatment Note  [facility]     Patient Name: Burke Reinoso Jr.  : 1930  MRN: [id number]  Today's Date: 2018  Onset of Illness/Injury or Date of Surgery: 18  Date of Referral to PT: 18  Referring Physician: Dr. Colon    Admit Date: 2018    Visit Dx:    ICD-10-CM ICD-9-CM   1. Closed displaced comminuted fracture of shaft of left femur, initial encounter (CMS/[facility]) S72.352A 821.01   2. Dysphagia, unspecified type R13.10 787.20   3. Impaired mobility Z74.09 799.89     Patient Active Problem List   Diagnosis   • Closed displaced intertrochanteric fracture of left femur (CMS/[facility])   • Closed displaced comminuted fracture of shaft of left femur (CMS/[facility])       Therapy Treatment          Rehabilitation Treatment Summary     Row Name 18 1054             Treatment Time/Intention    Discipline physical therapy assistant  -CW      Document Type therapy note (daily note)  -CW      Subjective Information complains of;pain  -CW      Mode of Treatment physical therapy  -CW      Comment Notified nsg - will not tolerate movement with RUE and has weak  - blister L heel   -CW      Existing Precautions/Restrictions fall;weight bearing   TTWB LLE  -CW      Treatment Considerations/Comments TTWB LLE  -CW      Recorded by [CW] Mary Sylvester PTA 18 1134      Row Name 18 1054             Motor Skills Assessment/Interventions    Additional Documentation Therapeutic Exercise (Group);Therapeutic Exercise Interventions (Group)  -CW      Recorded by [CW] Mary Sylvester PTA 18 1134      Row Name 18 1054             Therapeutic Exercise    Comment (Therapeutic Exercise) ROM as toleraed with LLE - pt keeps leg in one position of comfort per daughter. All ex were P/AA . Pt actively participated with abd/add without grimace. C/O pain with all other ex.  Blister noted L heel. Notified nsg.  -CW      Recorded by [CW] Mary Sylvester PTA  11/05/18 1134      Row Name 11/05/18 1054             Positioning and Restraints    Pre-Treatment Position in bed  -CW      Post Treatment Position bed  -CW      In Bed fowlers;call light within reach;encouraged to call for assist;exit alarm on;with family/caregiver;side rails up x3;LLE elevated;R heel elevated;notified nsg  -CW      Recorded by [CW] Mary Sylvester, PTA 11/05/18 1134      Row Name 11/05/18 1054             Pain Assessment    Additional Documentation Pain Scale 2: FACES Pre/Post-Treatment (Group)  -CW      Recorded by [CW] Mary Sylvester, PTA 11/05/18 1134      Row Name 11/05/18 1054             Pain Scale: Numbers Pre/Post-Treatment    Pain Location - Side Left  -CW      Pain Location - Orientation lower  -CW      Pain Location extremity  -CW      Recorded by [CW] Mary Sylvester, PTA 11/05/18 1134      Row Name 11/05/18 1054             Pain Scale: FACES Pre/Post-Treatment    Pain: FACES Scale, Pretreatment 0-->no hurt  -CW      Pain: FACES Scale, Post-Treatment 8-->hurts whole lot   during exercises  -CW      Recorded by [CW] Mary Sylvester, PTA 11/05/18 1134      Row Name 11/05/18 1054             Pain Scale 2: FACES Pre/Post-Treatment    Pain 2: FACES Scale, Pretreatment 0-->no hurt  -CW      Pain 2: FACES Scale, Post-Treatment 8-->hurts whole lot   with movement  -CW      Pain Location 2 - Side Right  -CW      Pain Location 2 - Orientation upper  -CW      Pain Location 2 extremity  -CW      Pre/Post Treatment Pain 2 Comment Weak  - will not move arm due to pain - notified nsg  -CW      Recorded by [CW] Mary Sylvester, PTA 11/05/18 1134      Row Name                Wound 10/27/18 1052 Left hip incision    Wound - Properties Group Date first assessed: 10/27/18 [AD] Time first assessed: 1052 [AD] Side: Left [AD] Location: hip [AD] Type: incision [AD] Recorded by:  [AD] Fernando Rajput RN 10/27/18 1052    Row Name                Wound 11/03/18 1237 Left leg incision     Wound - Properties Group Date first assessed: 11/03/18 [LC] Time first assessed: 1237 [LC] Side: Left [LC] Location: leg [LC] Type: incision [LC] Recorded by:  [LC] Luz Medina RN 11/03/18 1237      User Key  (r) = Recorded By, (t) = Taken By, (c) = Cosigned By    Initials Name Effective Dates Discipline    CW Mary Sylvester PTA 06/22/15 -  PT    LC Luz Medina RN 08/02/16 -  Nurse    AD Fernando Rajput RN 08/02/17 -  Nurse          Wound 10/27/18 1052 Left hip incision (Active)   Dressing Appearance dry;intact 11/5/2018  9:52 AM   Closure MONICA 11/5/2018  9:52 AM   Drainage Amount none 11/5/2018  9:52 AM   Dressing Care, Wound gauze;low-adherent 11/5/2018  9:52 AM       Wound 11/03/18 1237 Left leg incision (Active)   Dressing Appearance dry;intact 11/5/2018  9:52 AM   Closure MONICA 11/5/2018  9:52 AM   Drainage Amount none 11/5/2018  9:52 AM   Dressing Care, Wound gauze;low-adherent 11/5/2018  9:52 AM             Physical Therapy Education     Title: PT OT SLP Therapies (Active)     Topic: Physical Therapy (Active)     Point: Mobility training (Done)    Learning Progress Summary     Learner Status Readiness Method Response Comment Documented by    Patient Done Acceptance E VU Pt educated on use of bed rails to assist with rolling.  11/04/18 0817          Point: Home exercise program (Active)    Learning Progress Summary     Learner Status Readiness Method Response Comment Documented by    Patient Active Acceptance E,D NR benefit of exercise, plan of caare  11/05/18 1134    Family Active Acceptance E,D NR benefit of exercise, plan of caare  11/05/18 1134                      User Key     Initials Effective Dates Name Provider Type Discipline     06/22/15 -  Mary Sylvester PTA Physical Therapy Assistant PT     10/08/18 -  Andrea Roe, PT Student PT Student PT                    PT Recommendation and Plan     Plan of Care Reviewed With: patient  Progress: no change  Outcome Summary: Pt  agreeable to exercise in bed.  Performed gently ROM with LLE as tolerated. Noted pt has blister on L heel.  Notified nsg.  Also notified nsg of pt refusal to move R arm due to pain.  Will continue to work with pt as tolerated.          Outcome Measures     Row Name 11/05/18 1100 11/04/18 0817          How much help from another person do you currently need...    Turning from your back to your side while in flat bed without using bedrails? 2  -CW 2  -RAVI (r) CB (t) RAVI (c)     Moving from lying on back to sitting on the side of a flat bed without bedrails? 2  -CW 2  -RAVI (r) CB (t) RAVI (c)     Moving to and from a bed to a chair (including a wheelchair)? 2  -CW 2  -RAVI (r) CB (t) RAVI (c)     Standing up from a chair using your arms (e.g., wheelchair, bedside chair)? 2  -CW 2  -RAVI (r) CB (t) RAVI (c)     Climbing 3-5 steps with a railing? 1  -CW 1  -RAVI (r) CB (t) RAVI (c)     To walk in hospital room? 1  -CW 2  -RAVI (r) CB (t) RAVI (c)     AM-PAC 6 Clicks Score 10  -CW 11  -RAVI (r) CB (t)        Functional Assessment    Outcome Measure Options AM-PAC 6 Clicks Basic Mobility (PT)  -CW AM-PAC 6 Clicks Basic Mobility (PT)  -RAVI (r) CB (t) RAVI (c)       User Key  (r) = Recorded By, (t) = Taken By, (c) = Cosigned By    Initials Name Provider Type    Kalen Javier, PT DPT Physical Therapist    CW Mary Sylvester, DELMA Physical Therapy Assistant    CB Andrea Roe, LAURA Student PT Student           Time Calculation:         PT Charges     Row Name 11/05/18 1146             Time Calculation    Start Time 1054  -CW      Stop Time 1110  -CW      Time Calculation (min) 16 min  -CW      PT Received On 11/05/18  -CW      PT Goal Re-Cert Due Date 11/14/18  -CW         Time Calculation- PT    Total Timed Code Minutes- PT 16 minute(s)  -CW         Timed Charges    47563 - PT Therapeutic Exercise Minutes 16  -CW        User Key  (r) = Recorded By, (t) = Taken By, (c) = Cosigned By    Initials Name Provider Type    Mary Escobar  PTA Physical Therapy Assistant        Therapy Suggested Charges     Code   Minutes Charges    44311 (CPT®) Hc Pt Neuromusc Re Education Ea 15 Min      22180 (CPT®) Hc Pt Ther Proc Ea 15 Min 16 1    57641 (CPT®) Hc Gait Training Ea 15 Min      15284 (CPT®) Hc Pt Therapeutic Act Ea 15 Min      82630 (CPT®) Hc Pt Manual Therapy Ea 15 Min      26880 (CPT®) Hc Pt Iontophoresis Ea 15 Min      03836 (CPT®) Hc Pt Elec Stim Ea-Per 15 Min      45960 (CPT®) Hc Pt Ultrasound Ea 15 Min      90682 (CPT®) Hc Pt Self Care/Mgmt/Train Ea 15 Min      85556 (CPT®) Hc Pt Prosthetic (S) Train Initial Encounter, Each 15 Min      56859 (CPT®) Hc Pt Orthotic(S)/Prosthetic(S) Encounter, Each 15 Min      98264 (CPT®) Hc Orthotic(S) Mgmt/Train Initial Encounter, Each 15min      Total  16 1        Therapy Charges for Today     Code Description Service Date Service Provider Modifiers Qty    47922004367 HC PT THER PROC EA 15 MIN 11/5/2018 Mary Sylvester, PTA GP 1          PT G-Codes  Outcome Measure Options: AM-PAC 6 Clicks Basic Mobility (PT)  AM-PAC 6 Clicks Score: 10  Functional Limitation: Mobility: Walking and moving around  Mobility: Walking and Moving Around Current Status (): At least 60 percent but less than 80 percent impaired, limited or restricted  Mobility: Walking and Moving Around Goal Status (): At least 40 percent but less than 60 percent impaired, limited or restricted    Mary Sylvester PTA  11/5/2018

## 2018-11-06 LAB
ABO + RH BLD: NORMAL
ABO + RH BLD: NORMAL
ANION GAP SERPL CALCULATED.3IONS-SCNC: 8 MMOL/L (ref 4–13)
BH BB BLOOD EXPIRATION DATE: NORMAL
BH BB BLOOD EXPIRATION DATE: NORMAL
BH BB BLOOD TYPE BARCODE: 5100
BH BB BLOOD TYPE BARCODE: 5100
BH BB DISPENSE STATUS: NORMAL
BH BB DISPENSE STATUS: NORMAL
BH BB PRODUCT CODE: NORMAL
BH BB PRODUCT CODE: NORMAL
BH BB UNIT NUMBER: NORMAL
BH BB UNIT NUMBER: NORMAL
BUN BLD-MCNC: 28 MG/DL (ref 5–21)
BUN/CREAT SERPL: 32.9 (ref 7–25)
CALCIUM SPEC-SCNC: 8.5 MG/DL (ref 8.4–10.4)
CHLORIDE SERPL-SCNC: 97 MMOL/L (ref 98–110)
CO2 SERPL-SCNC: 33 MMOL/L (ref 24–31)
CREAT BLD-MCNC: 0.85 MG/DL (ref 0.5–1.4)
DEPRECATED RDW RBC AUTO: 50.3 FL (ref 40–54)
ERYTHROCYTE [DISTWIDTH] IN BLOOD BY AUTOMATED COUNT: 15.7 % (ref 12–15)
GFR SERPL CREATININE-BSD FRML MDRD: 85 ML/MIN/1.73
GLUCOSE BLD-MCNC: 105 MG/DL (ref 70–100)
HCT VFR BLD AUTO: 27.3 % (ref 40–52)
HGB BLD-MCNC: 8.7 G/DL (ref 14–18)
MCH RBC QN AUTO: 28.2 PG (ref 28–32)
MCHC RBC AUTO-ENTMCNC: 31.9 G/DL (ref 33–36)
MCV RBC AUTO: 88.3 FL (ref 82–95)
PLATELET # BLD AUTO: 312 10*3/MM3 (ref 130–400)
PMV BLD AUTO: 10.1 FL (ref 6–12)
POTASSIUM BLD-SCNC: 3.5 MMOL/L (ref 3.5–5.3)
RBC # BLD AUTO: 3.09 10*6/MM3 (ref 4.8–5.9)
SODIUM BLD-SCNC: 138 MMOL/L (ref 135–145)
UNIT  ABO: NORMAL
UNIT  ABO: NORMAL
UNIT  RH: NORMAL
UNIT  RH: NORMAL
WBC NRBC COR # BLD: 11.45 10*3/MM3 (ref 4.8–10.8)

## 2018-11-06 PROCEDURE — 97110 THERAPEUTIC EXERCISES: CPT

## 2018-11-06 PROCEDURE — 97530 THERAPEUTIC ACTIVITIES: CPT

## 2018-11-06 PROCEDURE — 85027 COMPLETE CBC AUTOMATED: CPT | Performed by: NURSE PRACTITIONER

## 2018-11-06 PROCEDURE — 80048 BASIC METABOLIC PNL TOTAL CA: CPT | Performed by: NURSE PRACTITIONER

## 2018-11-06 RX ORDER — POTASSIUM CHLORIDE 750 MG/1
40 CAPSULE, EXTENDED RELEASE ORAL DAILY
Status: DISCONTINUED | OUTPATIENT
Start: 2018-11-06 | End: 2018-11-13 | Stop reason: HOSPADM

## 2018-11-06 RX ADMIN — POLYETHYLENE GLYCOL 3350 17 G: 17 POWDER, FOR SOLUTION ORAL at 09:09

## 2018-11-06 RX ADMIN — POTASSIUM CHLORIDE 40 MEQ: 750 CAPSULE, EXTENDED RELEASE ORAL at 14:10

## 2018-11-06 RX ADMIN — HYDROCHLOROTHIAZIDE 25 MG: 25 TABLET ORAL at 09:08

## 2018-11-06 RX ADMIN — APIXABAN 2.5 MG: 2.5 TABLET, FILM COATED ORAL at 14:27

## 2018-11-06 RX ADMIN — CARBAMIDE PEROXIDE 6.5% 10 DROP: 6.5 LIQUID AURICULAR (OTIC) at 20:58

## 2018-11-06 RX ADMIN — HYDROCODONE BITARTRATE AND ACETAMINOPHEN 1 TABLET: 5; 325 TABLET ORAL at 20:58

## 2018-11-06 RX ADMIN — QUETIAPINE FUMARATE 50 MG: 25 TABLET, FILM COATED ORAL at 20:58

## 2018-11-06 RX ADMIN — FAMOTIDINE 20 MG: 20 TABLET, FILM COATED ORAL at 09:09

## 2018-11-06 NOTE — PLAN OF CARE
Problem: Skin Injury Risk (Adult)  Goal: Skin Health and Integrity  Outcome: Ongoing (interventions implemented as appropriate)      Problem: Patient Care Overview  Goal: Plan of Care Review  Outcome: Ongoing (interventions implemented as appropriate)   11/06/18 0737   Coping/Psychosocial   Plan of Care Reviewed With patient   Plan of Care Review   Progress no change   OTHER   Outcome Summary Dressing cdi, Norco given for pain with relief, no new skin breakdown noted, incontinent, turned q2hrs, VSS, screams out in pain with repositioning.        Problem: Fall Risk (Adult)  Goal: Absence of Fall  Outcome: Ongoing (interventions implemented as appropriate)      Problem: Nutrition, Imbalanced: Inadequate Oral Intake (Adult)  Goal: Improved Oral Intake  Outcome: Ongoing (interventions implemented as appropriate)    Goal: Prevent Further Weight Loss  Outcome: Ongoing (interventions implemented as appropriate)      Problem: Hip Arthroplasty (Total, Partial) (Adult)  Goal: Signs and Symptoms of Listed Potential Problems Will be Absent, Minimized or Managed (Hip Arthroplasty)  Outcome: Ongoing (interventions implemented as appropriate)

## 2018-11-06 NOTE — PLAN OF CARE
Problem: Patient Care Overview  Goal: Plan of Care Review  Outcome: Ongoing (interventions implemented as appropriate)   11/06/18 1031   Coping/Psychosocial   Plan of Care Reviewed With patient   Plan of Care Review   Progress no change   OTHER   Outcome Summary PT tx completed. Pt supine in bed. No visible pn at rest. MaxAx 2 sup<>sit. CG sitting edge of bed x 15 minutes. [place]. Followed very few commands for exercise. Recommend SNF for cont'd PT. Progress slowly per Dr. Colon.

## 2018-11-06 NOTE — THERAPY TREATMENT NOTE
Acute Care - Physical Therapy Treatment Note  [facility]     Patient Name: Burke Reinoso Jr.  : 1930  MRN: [id number]  Today's Date: 2018  Onset of Illness/Injury or Date of Surgery: 18  Date of Referral to PT: 18  Referring Physician: Dr. Colon    Admit Date: 2018    Visit Dx:    ICD-10-CM ICD-9-CM   1. Closed displaced comminuted fracture of shaft of left femur, initial encounter (CMS/[facility]) S72.352A 821.01   2. Dysphagia, unspecified type R13.10 787.20   3. Impaired mobility Z74.09 799.89     Patient Active Problem List   Diagnosis   • Closed displaced intertrochanteric fracture of left femur (CMS/[facility])   • Closed displaced comminuted fracture of shaft of left femur (CMS/[facility])       Therapy Treatment          Rehabilitation Treatment Summary     Row Name 18 1454 18 0920          Treatment Time/Intention    Discipline physical therapy assistant  - physical therapy assistant  -KJ     Document Type therapy note (daily note)  -[facility] therapy note (daily note)  -KJ     Subjective Information complains of;pain;weakness  -[facility] complains of;pain  -KJ     Mode of Treatment  -- physical therapy  -KJ     Patient Effort  -- adequate  -KJ     Comment [place],progress slowly per  due to nature of fracture  -[facility] [place];progress very slowly per Dr. Colon due to nature of break  -KJ     Existing Precautions/Restrictions fall;weight bearing;left  -[facility] fall  -KJ     Treatment Considerations/Comments TTWB LLE  -[facility] TTWB LLE  -KJ     Recorded by [] Caren Nation, PTA 18 1455  [AH2] Caren Nation, PTA 18 1517 [KJ] Karen Zimmer, PTA 18 1030     Row Name 18 1454 18 0920          Mobility Assessment/Intervention    Extremity Weight-bearing Status left lower extremity  - left lower extremity  -KJ     Left Lower Extremity (Weight-bearing Status) toe touch weight-bearing (TTWB)  - toe touch weight-bearing (TTWB)  -KJ     Recorded by [AH] Caren Nation,  PTA 11/06/18 1517 [KJ] Karen Zimmer, PTA 11/06/18 1030     Row Name 11/06/18 0920             Bed Mobility Assessment/Treatment    Supine-Sit [place] (Bed Mobility) maximum assist (25% patient effort);2 person assist  -KJ      Sit-Supine [place] (Bed Mobility) dependent (less than 25% patient effort)  -KJ      Comment (Bed Mobility) sit edge of bed x 15 minutes SBA/CG  -KJ      Recorded by [KJ] Karen Zimmer, PTA 11/06/18 1030      Row Name 11/06/18 0920             Gait/Stairs Assessment/Training    Comment (Gait/Stairs) not appropriate  -KJ      Recorded by [KJ] Karen Zimmer, PTA 11/06/18 1030      Row Name 11/06/18 1454 11/06/18 0920          Therapeutic Exercise    Comment (Therapeutic Exercise) A-AAROM RLE, AAROM LLE 10 reps x 2  -AH AROM RLE x 5; would not stay on task for exercise  -KJ     Recorded by [] Caren Nation, PTA 11/06/18 1517 [KJ] Karen Zimmer, PTA 11/06/18 1030     Row Name 11/06/18 1454 11/06/18 0920          Positioning and Restraints    Pre-Treatment Position in bed  -AH in bed  -KJ     Post Treatment Position bed  -AH bed  -KJ     In Bed fowlers;call light within reach;encouraged to call for assist;exit alarm on;L heel elevated;SCD pump applied  -  --     Recorded by [] Caren Nation, PTA 11/06/18 1517 [KJ] Karen Zimmer, PTA 11/06/18 1030     Row Name 11/06/18 1454             Pain Assessment    Additional Documentation Pain Scale: FACES Pre/Post-Treatment (Group)  -AH      Recorded by [] aCren Nation, PTA 11/06/18 1517      Row Name 11/06/18 1454 11/06/18 0920          Pain Scale: Numbers Pre/Post-Treatment    Pain Location - Side Left  -AH Left  -KJ     Pain Location - Orientation lower  -AH lower  -KJ     Pain Location extremity  -AH extremity  -KJ     Recorded by [] Caren Nation, PTA 11/06/18 1517 [KJ] Karen Zimmer, PTA 11/06/18 1030     Row Name 11/06/18 1454             Pain Scale: FACES Pre/Post-Treatment    Pain: FACES Scale, Pretreatment  6-->hurts even more  -AH      Pre/Post Treatment Pain Comment with ROM  -AH      Recorded by [AH] Caren Nation, PTA 11/06/18 1517      Row Name 11/06/18 0920             Pain Scale 2: FACES Pre/Post-Treatment    Pain 2: FACES Scale, Pretreatment 6-->hurts even more  -KJ      Pain 2: FACES Scale, Post-Treatment 8-->hurts whole lot  -KJ      Pain Location 2 - Side Left  -KJ      Pain Location 2 - Orientation lower  -KJ      Pain Location 2 extremity  -KJ      Recorded by [KJ] Karen Zimmer, PTA 11/06/18 1030      Row Name                Wound 10/27/18 1052 Left hip incision    Wound - Properties Group Date first assessed: 10/27/18 [AD] Time first assessed: 1052 [AD] Side: Left [AD] Location: hip [AD] Type: incision [AD] Recorded by:  [AD] Fernando Rajput RN 10/27/18 1052    Row Name                Wound 11/03/18 1237 Left leg incision    Wound - Properties Group Date first assessed: 11/03/18 [LC] Time first assessed: 1237 [LC] Side: Left [LC] Location: leg [LC] Type: incision [LC] Recorded by:  [LC] Luz Medina RN 11/03/18 1237    Row Name                Wound 11/05/18 0952 Left heel blisters    Wound - Properties Group Date first assessed: 11/05/18 [AW] Time first assessed: 0952 [AW] Present On Admission : picture taken;no [AW] Side: Left [AW] Location: heel [AW] Type: blisters [AW] Recorded by:  [AW] Jaclyn Martinez RN 11/05/18 1520      User Key  (r) = Recorded By, (t) = Taken By, (c) = Cosigned By    Initials Name Effective Dates Discipline     Caren Nation, PTA 08/02/16 -  PT    Karen Brooks, PTA 08/02/16 -  PT    Luz Lopez RN 08/02/16 -  Nurse    AD Fernando Rajput RN 08/02/17 -  Nurse    Jaclyn Pineda RN 01/12/18 -  Nurse          Wound 10/27/18 1052 Left hip incision (Active)   Dressing Appearance dry;intact;no drainage 11/6/2018  9:10 AM   Closure [place] 11/6/2018  9:10 AM   Dressing Care, Wound open to air 11/6/2018  9:10 AM       Wound 11/03/18 1237 Left leg  incision (Active)   Dressing Appearance dry;intact;no drainage 11/6/2018  9:10 AM   Closure MONICA 11/5/2018  7:37 PM   Base dressing in place, unable to visualize 11/5/2018  7:37 PM   Drainage Amount none 11/6/2018  9:10 AM   Dressing Care, Wound gauze;low-adherent 11/6/2018  9:10 AM       Wound 11/05/18 0952 Left heel blisters (Active)   Dressing Appearance open to air 11/6/2018  9:10 AM   Drainage Amount none 11/6/2018  9:10 AM   Dressing Care, Wound open to air 11/5/2018  9:57 PM             Physical Therapy Education     Title: PT OT SLP Therapies (Active)     Topic: Physical Therapy (Active)     Point: Mobility training (Done)    Learning Progress Summary     Learner Status Readiness Method Response Comment Documented by    Patient Done Acceptance E VU Pt educated on use of bed rails to assist with rolling. CB 11/04/18 0817          Point: Home exercise program (Active)    Learning Progress Summary     Learner Status Readiness Method Response Comment Documented by    Patient Active Acceptance E,D NR benefit of exercise, plan of caare  11/05/18 1134    Family Active Acceptance E,D NR benefit of exercise, plan of caare  11/05/18 1134                      User Key     Initials Effective Dates Name Provider Type Discipline     06/22/15 -  Mary Sylvester PTA Physical Therapy Assistant PT    CB 10/08/18 -  Andrea Roe PT Student PT Student PT                    PT Recommendation and Plan                Outcome Measures     Row Name 11/06/18 1000 11/05/18 1100 11/04/18 0817       How much help from another person do you currently need...    Turning from your back to your side while in flat bed without using bedrails? 1  -KJ 2  -CW 2  -RAVI (r) CB (t) RAVI (c)    Moving from lying on back to sitting on the side of a flat bed without bedrails? 1  -KJ 2  -CW 2  -RAVI (r) CB (t) RAVI (c)    Moving to and from a bed to a chair (including a wheelchair)? 1  -KJ 2  -CW 2  -RAVI (r) CB (t) RAVI (c)    Standing up from a  chair using your arms (e.g., wheelchair, bedside chair)? 1  -KJ 2  -CW 2  -RAVI (r) CB (t) RAVI (c)    Climbing 3-5 steps with a railing? 1  -KJ 1  -CW 1  -RAVI (r) CB (t) RAVI (c)    To walk in hospital room? 1  -KJ 1  -CW 2  -RAVI (r) CB (t) RAVI (c)    AM-PAC 6 Clicks Score 6  -KJ 10  -CW 11  -RAVI (r) CB (t)       Functional Assessment    Outcome Measure Options AM-PAC 6 Clicks Basic Mobility (PT)  -KJ AM-PAC 6 Clicks Basic Mobility (PT)  -CW AM-PAC 6 Clicks Basic Mobility (PT)  -RAVI (r) CB (t) RAVI (c)      User Key  (r) = Recorded By, (t) = Taken By, (c) = Cosigned By    Initials Name Provider Type    Karen Brooks, PTA Physical Therapy Assistant    Kalen Javier, PT DPT Physical Therapist    CW Mary Sylvester, PTA Physical Therapy Assistant    Andrea Pierre, PT Student PT Student           Time Calculation:         PT Charges     Row Name 11/06/18 1517 11/06/18 1030          Time Calculation    Start Time 1454  -AH 0920  -KJ     Stop Time 1517  -AH 0950  -KJ     Time Calculation (min) 23 min  -AH 30 min  -KJ     PT Received On  -- 11/06/18  -KJ     PT Goal Re-Cert Due Date  -- 11/14/18  -KJ        Time Calculation- PT    Total Timed Code Minutes- PT 23 minute(s)  -AH 30 minute(s)  -KJ        Timed Charges    97053 - PT Therapeutic Exercise Minutes 23  -AH  --       User Key  (r) = Recorded By, (t) = Taken By, (c) = Cosigned By    Initials Name Provider Type     Caren Nation, PTA Physical Therapy Assistant    Karen Brooks, PTA Physical Therapy Assistant        Therapy Suggested Charges     Code   Minutes Charges    68462 (CPT®) Hc Pt Neuromusc Re Education Ea 15 Min      75241 (CPT®) Hc Pt Ther Proc Ea 15 Min 23 2    81171 (CPT®) Hc Gait Training Ea 15 Min      51441 (CPT®) Hc Pt Therapeutic Act Ea 15 Min      54362 (CPT®) Hc Pt Manual Therapy Ea 15 Min      59445 (CPT®) Hc Pt Iontophoresis Ea 15 Min      91770 (CPT®) Hc Pt Elec Stim Ea-Per 15 Min      81623 (CPT®) Hc Pt Ultrasound Ea 15 Min       89266 (CPT®) Hc Pt Self Care/Mgmt/Train Ea 15 Min      24749 (CPT®) Hc Pt Prosthetic (S) Train Initial Encounter, Each 15 Min      61305 (CPT®) Hc Pt Orthotic(S)/Prosthetic(S) Encounter, Each 15 Min      04480 (CPT®) Hc Orthotic(S) Mgmt/Train Initial Encounter, Each 15min      Total  23 2        Therapy Charges for Today     Code Description Service Date Service Provider Modifiers Qty    38450921792 HC PT THER PROC EA 15 MIN 11/6/2018 Caren Nation, PTA GP 2          PT G-Codes  Outcome Measure Options: AM-PAC 6 Clicks Basic Mobility (PT)  AM-PAC 6 Clicks Score: 6  Functional Limitation: Mobility: Walking and moving around  Mobility: Walking and Moving Around Current Status (): At least 60 percent but less than 80 percent impaired, limited or restricted  Mobility: Walking and Moving Around Goal Status (): At least 40 percent but less than 60 percent impaired, limited or restricted    Caren Nation, PTA  11/6/2018

## 2018-11-06 NOTE — PROGRESS NOTES
"    [facility] Medicine Services  INPATIENT PROGRESS NOTE    Patient Name: Burke Reinoso Jr.  Date of Admission: 11/1/2018  Today's Date: 11/06/18  Length of Stay: 4  Primary Care Physician: Mg Colmenares MD    Subjective   Chief Complaint: left hip pain    HPI   Sitting up in bed about to eat breakfast with family assistance.  He states, \"I am old and tired.\"  That is all I could get him to say for the most part.  He is baseline dementia.  Family does not want him to go back to [place], they wish to seek placement elsewhere.     Review of Systems   Constitutional: Positive for activity change and fatigue. Negative for appetite change, chills and fever.   HENT: Negative for hearing loss, nosebleeds, tinnitus and trouble swallowing.    Eyes: Negative for visual disturbance.   Respiratory: Negative for cough, chest tightness, shortness of breath and wheezing.    Cardiovascular: Negative for chest pain, palpitations and leg swelling.   Gastrointestinal: Negative for abdominal distention, abdominal pain, blood in stool, constipation, diarrhea, nausea and vomiting.   Endocrine: Negative for cold intolerance, heat intolerance, polydipsia, polyphagia and polyuria.   Genitourinary: Negative for decreased urine volume, difficulty urinating, dysuria, flank pain, frequency and hematuria.   Musculoskeletal: Positive for gait problem, joint swelling and myalgias. Negative for arthralgias.   Skin: Negative for rash.   Allergic/Immunologic: Negative for immunocompromised state.   Neurological: Positive for weakness. Negative for dizziness, syncope, light-headedness and headaches.   Hematological: Negative for adenopathy. Does not bruise/bleed easily.   Psychiatric/Behavioral: Positive for confusion. Negative for sleep disturbance. The patient is not nervous/anxious.       All pertinent negatives and positives are as above. All other systems have been reviewed and are negative unless otherwise " stated.     Objective    Temp:  [97.8 °F (36.6 °C)-98.5 °F (36.9 °C)] 97.8 °F (36.6 °C)  Heart Rate:  [] 85  Resp:  [16-18] 16  BP: (112-137)/(49-86) 137/57     Physical Exam   Constitutional: He appears well-developed and well-nourished. He appears lethargic.   Lying in bed. NAD.   HENT:   Head: Normocephalic and atraumatic.   Eyes: Pupils are equal, round, and reactive to light. Conjunctivae and EOM are normal.   Neck: Neck supple. No JVD present. No thyromegaly present.   Cardiovascular: Normal rate, regular rhythm, normal heart sounds and intact distal pulses.  Exam reveals no gallop and no friction rub.    No murmur heard.  Pulmonary/Chest: Effort normal and breath sounds normal. No respiratory distress. He has no wheezes. He has no rales. He exhibits no tenderness.   Abdominal: Soft. Bowel sounds are normal. He exhibits no distension. There is no tenderness. There is no rebound and no guarding.   Genitourinary:   Genitourinary Comments: Incontinent     Musculoskeletal: Normal range of motion. He exhibits no edema, tenderness or deformity.   Bruising noted to left hip/groin   Lymphadenopathy:     He has no cervical adenopathy.   Neurological: He appears lethargic. He is disoriented.   Skin: Skin is warm and dry. No rash noted.   Psychiatric: He has a normal mood and affect. His behavior is normal. Judgment and thought content normal.   Nursing note and vitals reviewed.    Results Review:  I have reviewed the labs, radiology results, and diagnostic studies.    Laboratory Data:     Results from last 7 days  Lab Units 11/06/18  0437 11/05/18  0710 11/04/18  0755   WBC 10*3/mm3 11.45* 13.44* 14.69*   HEMOGLOBIN g/dL 8.7* 8.8* 9.2*   HEMATOCRIT % 27.3* 26.5* 27.9*   PLATELETS 10*3/mm3 312 344 337        Results from last 7 days  Lab Units 11/06/18  0437 11/05/18  0710 11/04/18  0755  11/01/18  2337   SODIUM mmol/L 138 137 136  < > 135   POTASSIUM mmol/L 3.5 3.2* 3.5  < > 4.6   CHLORIDE mmol/L 97* 94* 95*  < >  94*   CO2 mmol/L 33.0* 33.0* 30.0  < > 31.0   BUN mg/dL 28* 26* 28*  < > 26*   CREATININE mg/dL 0.85 0.86 0.95  < > 0.87   CALCIUM mg/dL 8.5 8.3* 8.1*  < > 8.6   BILIRUBIN mg/dL  --   --   --   --  1.2*   ALK PHOS U/L  --   --   --   --  80   ALT (SGPT) U/L  --   --   --   --  37   AST (SGOT) U/L  --   --   --   --  47*   GLUCOSE mg/dL 105* 102* 94  < > 129*   < > = values in this interval not displayed.    Results from last 7 days  Lab Units 11/05/18  0710 11/03/18  0749 11/02/18  0440   INR  1.17* 1.26* 1.46*     Radiology Data:   Imaging Results (last 24 hours)     ** No results found for the last 24 hours. **        I have reviewed the patient's current medications.     Assessment/Plan     Assessment:  1.  Acute traumatic comminuted periprosthetic fracture along the proximal diaphysis of the femur on 11/3/18  2.  Frequent falls  3.  Recent left intertrochanteric hip fracture with trochanteric fixation nailing on 10/27/18  4.  Acute blood loss anemia from recent surgery   5.  Acutely anticoagulated with Coumadin for DVT prophylaxis secondary to #3  6.  Constipation, chronic  7.  Mild protein malnutrition   8.  Essential hypertension   9.  Leukocytosis, suspected reactive  10.  Hypokalemia     Plan:  1.  POD #3 From removal of pre-existing trochanteric fixation nail and placement of long trochanteric fixation nail left femur and cabling of femoral shaft fracture  2.  PT/OT, slowly  3.  Transfused 4 units PRBC and 4 units FFP total since admission   4.  Bowel regimen   5.  CBC and BMP in AM  6.  SCDs for DVT prophylaxis  7.   following: [facility] - precert  8.  PT/INR in AM  9.  Chest xray normal  10.  Has not been out of bed since surgery, Dr. Colon wants slow therapy due to severity of the fracture.  Patient doesn't follow directions well.  11.  Replace K+  12.  Currently not on any DVT prophylaxis, Dr. Colon has not made recommendations.  Patient did have a lot of blood loss  in surgery that required transfusion.     Discharge Planning: I expect the patient to be discharged to rehab/SNF in 1-2 days.    ALKA Doran   11/06/18   12:02 PM     I personally evaluated and examined the patient in conjunction with ALKA Ellis and agree with the assessment, treatment plan, and disposition of the patient as recorded by her. My history, exam, and further recommendations are:     Discussed with his nurse, Carley.    Medically ready for discharge. Awaiting insurance to allow for placement.     Eliquis 2.5 mg BID for DVT prophylaxis.     Mingo Ramon DO  11/06/18  1:40 PM

## 2018-11-06 NOTE — PLAN OF CARE
Problem: Skin Injury Risk (Adult)  Goal: Skin Health and Integrity  Outcome: Ongoing (interventions implemented as appropriate)      Problem: Patient Care Overview  Goal: Plan of Care Review  Outcome: Ongoing (interventions implemented as appropriate)   11/06/18 7332   Coping/Psychosocial   Plan of Care Reviewed With patient   Plan of Care Review   Progress no change   OTHER   Outcome Summary Oriented to person. Confused. No neuro changes. Safety maintained. Turning. Both skin tear dressings changed today. Incontinent. Turning. Very [place]. No c/o pain today. When turning patient did not scream out during this shift. Left hip dressing CDI. Will continue to monitor.      Goal: Individualization and Mutuality  Outcome: Ongoing (interventions implemented as appropriate)    Goal: Discharge Needs Assessment  Outcome: Ongoing (interventions implemented as appropriate)    Goal: Interprofessional Rounds/Family Conf  Outcome: Ongoing (interventions implemented as appropriate)      Problem: Fall Risk (Adult)  Goal: Absence of Fall  Outcome: Ongoing (interventions implemented as appropriate)      Problem: Nutrition, Imbalanced: Inadequate Oral Intake (Adult)  Goal: Improved Oral Intake  Outcome: Ongoing (interventions implemented as appropriate)    Goal: Prevent Further Weight Loss  Outcome: Ongoing (interventions implemented as appropriate)      Problem: Hip Arthroplasty (Total, Partial) (Adult)  Goal: Signs and Symptoms of Listed Potential Problems Will be Absent, Minimized or Managed (Hip Arthroplasty)  Outcome: Ongoing (interventions implemented as appropriate)    Goal: Anesthesia/Sedation Recovery  Outcome: Ongoing (interventions implemented as appropriate)

## 2018-11-06 NOTE — THERAPY TREATMENT NOTE
Acute Care - Physical Therapy Treatment Note  [facility]     Patient Name: Burke Reinoso Jr.  : 1930  MRN: [id number]  Today's Date: 2018  Onset of Illness/Injury or Date of Surgery: 18  Date of Referral to PT: 18  Referring Physician: Dr. Colon    Admit Date: 2018    Visit Dx:    ICD-10-CM ICD-9-CM   1. Closed displaced comminuted fracture of shaft of left femur, initial encounter (CMS/[facility]) S72.352A 821.01   2. Dysphagia, unspecified type R13.10 787.20   3. Impaired mobility Z74.09 799.89     Patient Active Problem List   Diagnosis   • Closed displaced intertrochanteric fracture of left femur (CMS/[facility])   • Closed displaced comminuted fracture of shaft of left femur (CMS/[facility])       Therapy Treatment          Rehabilitation Treatment Summary     Row Name 18 0920             Treatment Time/Intention    Discipline physical therapy assistant  -KJ      Document Type therapy note (daily note)  -KJ      Subjective Information complains of;pain  -KJ      Mode of Treatment physical therapy  -KJ      Patient Effort adequate  -KJ      Comment [place];progress very slowly per Dr. Colon due to nature of break  -KJ      Existing Precautions/Restrictions fall  -KJ      Treatment Considerations/Comments TTWB LLE  -KJ      Recorded by [KJ] Karen Zimmer PTA 18 1030      Row Name 18 0920             Mobility Assessment/Intervention    Extremity Weight-bearing Status left lower extremity  -KJ      Left Lower Extremity (Weight-bearing Status) toe touch weight-bearing (TTWB)  -KJ      Recorded by [KJ] Karen Zimmer PTA 18 1030      Row Name 18 0920             Bed Mobility Assessment/Treatment    Supine-Sit [place] (Bed Mobility) maximum assist (25% patient effort);2 person assist  -KJ      Sit-Supine [place] (Bed Mobility) dependent (less than 25% patient effort)  -KJ      Comment (Bed Mobility) sit edge of bed x 15 minutes SBA/CG  -KJ      Recorded by [KJ]  Karen Zimmer, PTA 11/06/18 1030      Row Name 11/06/18 0920             Gait/Stairs Assessment/Training    Comment (Gait/Stairs) not appropriate  -KJ      Recorded by [KJ] Karen Zimmer, PTA 11/06/18 1030      Row Name 11/06/18 0920             Therapeutic Exercise    Comment (Therapeutic Exercise) AROM RLE x 5; would not stay on task for exercise  -KJ      Recorded by [KJ] Karen Zimmer, PTA 11/06/18 1030      Row Name 11/06/18 0920             Positioning and Restraints    Pre-Treatment Position in bed  -KJ      Post Treatment Position bed  -KJ      Recorded by [KJ] Karen Zimmer, PTA 11/06/18 1030      Row Name 11/06/18 0920             Pain Scale: Numbers Pre/Post-Treatment    Pain Location - Side Left  -KJ      Pain Location - Orientation lower  -KJ      Pain Location extremity  -KJ      Recorded by [KJ] Karen Zimmer, PTA 11/06/18 1030      Row Name 11/06/18 0920             Pain Scale 2: FACES Pre/Post-Treatment    Pain 2: FACES Scale, Pretreatment 6-->hurts even more  -KJ      Pain 2: FACES Scale, Post-Treatment 8-->hurts whole lot  -KJ      Pain Location 2 - Side Left  -KJ      Pain Location 2 - Orientation lower  -KJ      Pain Location 2 extremity  -KJ      Recorded by [KJ] Karen Zimmer, PTA 11/06/18 1030      Row Name                Wound 10/27/18 1052 Left hip incision    Wound - Properties Group Date first assessed: 10/27/18 [AD] Time first assessed: 1052 [AD] Side: Left [AD] Location: hip [AD] Type: incision [AD] Recorded by:  [AD] Fernando Rajput RN 10/27/18 1052    Row Name                Wound 11/03/18 1237 Left leg incision    Wound - Properties Group Date first assessed: 11/03/18 [LC] Time first assessed: 1237 [LC] Side: Left [LC] Location: leg [LC] Type: incision [LC] Recorded by:  [LC] Luz Medina RN 11/03/18 1237    Row Name                Wound 11/05/18 0952 Left heel blisters    Wound - Properties Group Date first assessed: 11/05/18 [AW] Time first assessed: 0952 [AW]  Present On Admission : picture taken;no [AW] Side: Left [AW] Location: heel [AW] Type: blisters [AW] Recorded by:  [AW] Jaclyn Martinez RN 11/05/18 1520      User Key  (r) = Recorded By, (t) = Taken By, (c) = Cosigned By    Initials Name Effective Dates Discipline    Karen Brooks, DELMA 08/02/16 -  PT    Luz Lopez RN 08/02/16 -  Nurse    Fernando Morales RN 08/02/17 -  Nurse    Jaclyn Pineda RN 01/12/18 -  Nurse          Wound 10/27/18 1052 Left hip incision (Active)   Dressing Appearance dry;intact;no drainage 11/5/2018 12:26 PM   Closure Staples 11/5/2018 12:26 PM   Drainage Amount none 11/5/2018 12:26 PM   Care, Wound cleansed with;soap and water 11/5/2018 12:26 PM   Dressing Care, Wound dressing changed;gauze;low-adherent 11/5/2018 12:26 PM       Wound 11/03/18 1237 Left leg incision (Active)   Dressing Appearance dry;intact;no drainage 11/5/2018  7:37 PM   Closure MONICA 11/5/2018  7:37 PM   Base dressing in place, unable to visualize 11/5/2018  7:37 PM   Drainage Amount none 11/5/2018  7:37 PM   Care, Wound cleansed with;soap and water 11/5/2018 12:26 PM   Dressing Care, Wound gauze 11/5/2018  7:37 PM       Wound 11/05/18 0952 Left heel blisters (Active)   Dressing Appearance open to air 11/5/2018  9:57 PM   Drainage Amount none 11/5/2018  9:57 PM   Dressing Care, Wound open to air 11/5/2018  9:57 PM             Physical Therapy Education     Title: PT OT SLP Therapies (Active)     Topic: Physical Therapy (Active)     Point: Mobility training (Done)    Learning Progress Summary     Learner Status Readiness Method Response Comment Documented by    Patient Done Acceptance E VU Pt educated on use of bed rails to assist with rolling. CB 11/04/18 0817          Point: Home exercise program (Active)    Learning Progress Summary     Learner Status Readiness Method Response Comment Documented by    Patient Active Acceptance E,D NR benefit of exercise, plan of caare CW 11/05/18 1134    Family  Active Acceptance E,D NR benefit of exercise, plan of caare  11/05/18 1134                      User Key     Initials Effective Dates Name Provider Type Discipline     06/22/15 -  Mary Sylvester PTA Physical Therapy Assistant PT    CB 10/08/18 -  Andrea Roe PT Student PT Student PT                    PT Recommendation and Plan     Plan of Care Reviewed With: patient  Progress: no change  Outcome Summary: PT tx completed. Pt supine in bed. No visible pn at rest. MaxAx 2 sup<>sit. CG sitting edge of bed  x 15 minutes. [place]. Followed very few commands for exercise. Recommend SNF for cont'd PT. Progress slowly per Dr. Colon.          Outcome Measures     Row Name 11/06/18 1000 11/05/18 1100 11/04/18 0817       How much help from another person do you currently need...    Turning from your back to your side while in flat bed without using bedrails? 1  -KJ 2  -CW 2  -RAVI (r) CB (t) RAVI (c)    Moving from lying on back to sitting on the side of a flat bed without bedrails? 1  -KJ 2  -CW 2  -RAVI (r) CB (t) RAVI (c)    Moving to and from a bed to a chair (including a wheelchair)? 1  -KJ 2  -CW 2  -RAVI (r) CB (t) RAVI (c)    Standing up from a chair using your arms (e.g., wheelchair, bedside chair)? 1  -KJ 2  -CW 2  -RAVI (r) CB (t) RAVI (c)    Climbing 3-5 steps with a railing? 1  -KJ 1  -CW 1  -RAVI (r) CB (t) RAVI (c)    To walk in hospital room? 1  -KJ 1  -CW 2  -RAVI (r) CB (t) RAVI (c)    AM-PAC 6 Clicks Score 6  -KJ 10  -CW 11  -RAVI (r) CB (t)       Functional Assessment    Outcome Measure Options AM-PAC 6 Clicks Basic Mobility (PT)  -KJ AM-PAC 6 Clicks Basic Mobility (PT)  -CW AM-PAC 6 Clicks Basic Mobility (PT)  -RAVI (r) CB (t) RAVI (c)      User Key  (r) = Recorded By, (t) = Taken By, (c) = Cosigned By    Initials Name Provider Type    Karen Brooks, PTA Physical Therapy Assistant    Kalen Javier, PT DPT Physical Therapist    Mary Escobar, PTA Physical Therapy Assistant    Andrea Pierre, PT  Student PT Student           Time Calculation:         PT Charges     Row Name 11/06/18 1030             Time Calculation    Start Time 0920  -KJ      Stop Time 0950  -KJ      Time Calculation (min) 30 min  -KJ      PT Received On 11/06/18  -KJ      PT Goal Re-Cert Due Date 11/14/18  -KJ         Time Calculation- PT    Total Timed Code Minutes- PT 30 minute(s)  -KJ        User Key  (r) = Recorded By, (t) = Taken By, (c) = Cosigned By    Initials Name Provider Type    Karen Brooks, PTA Physical Therapy Assistant        Therapy Suggested Charges     Code   Minutes Charges    87909 (CPT®) Hc Pt Neuromusc Re Education Ea 15 Min      54008 (CPT®) Hc Pt Ther Proc Ea 15 Min 16 1    70747 (CPT®) Hc Gait Training Ea 15 Min      33439 (CPT®) Hc Pt Therapeutic Act Ea 15 Min      97080 (CPT®) Hc Pt Manual Therapy Ea 15 Min      94451 (CPT®) Hc Pt Iontophoresis Ea 15 Min      36454 (CPT®) Hc Pt Elec Stim Ea-Per 15 Min      76752 (CPT®) Hc Pt Ultrasound Ea 15 Min      68208 (CPT®) Hc Pt Self Care/Mgmt/Train Ea 15 Min      03963 (CPT®) Hc Pt Prosthetic (S) Train Initial Encounter, Each 15 Min      88691 (CPT®) Hc Pt Orthotic(S)/Prosthetic(S) Encounter, Each 15 Min      14258 (CPT®) Hc Orthotic(S) Mgmt/Train Initial Encounter, Each 15min      Total  16 1        Therapy Charges for Today     Code Description Service Date Service Provider Modifiers Qty    79856301996 HC PT THERAPEUTIC ACT EA 15 MIN 11/6/2018 Karen Zimmer, DELMA GP 2          PT G-Codes  Outcome Measure Options: AM-PAC 6 Clicks Basic Mobility (PT)  AM-PAC 6 Clicks Score: 6  Functional Limitation: Mobility: Walking and moving around  Mobility: Walking and Moving Around Current Status (): At least 60 percent but less than 80 percent impaired, limited or restricted  Mobility: Walking and Moving Around Goal Status (): At least 40 percent but less than 60 percent impaired, limited or restricted    Karen Zimmer PTA  11/6/2018

## 2018-11-06 NOTE — PROGRESS NOTES
Spaces post internal fixation of a severe left femur fracture.  He appears to be progressing satisfactorily.  His swelling in his extremity is improving.  Plan can continue with physical therapy slowly

## 2018-11-07 LAB
ANION GAP SERPL CALCULATED.3IONS-SCNC: 11 MMOL/L (ref 4–13)
BASOPHILS # BLD AUTO: 0.06 10*3/MM3 (ref 0–0.2)
BASOPHILS NFR BLD AUTO: 0.5 % (ref 0–2)
BUN BLD-MCNC: 23 MG/DL (ref 5–21)
BUN/CREAT SERPL: 30.7 (ref 7–25)
CALCIUM SPEC-SCNC: 8.9 MG/DL (ref 8.4–10.4)
CHLORIDE SERPL-SCNC: 96 MMOL/L (ref 98–110)
CO2 SERPL-SCNC: 31 MMOL/L (ref 24–31)
CREAT BLD-MCNC: 0.75 MG/DL (ref 0.5–1.4)
DEPRECATED RDW RBC AUTO: 49.1 FL (ref 40–54)
EOSINOPHIL # BLD AUTO: 0.2 10*3/MM3 (ref 0–0.7)
EOSINOPHIL NFR BLD AUTO: 1.7 % (ref 0–4)
ERYTHROCYTE [DISTWIDTH] IN BLOOD BY AUTOMATED COUNT: 15.4 % (ref 12–15)
GFR SERPL CREATININE-BSD FRML MDRD: 98 ML/MIN/1.73
GLUCOSE BLD-MCNC: 98 MG/DL (ref 70–100)
HCT VFR BLD AUTO: 28.1 % (ref 40–52)
HGB BLD-MCNC: 9 G/DL (ref 14–18)
IMM GRANULOCYTES # BLD: 0.23 10*3/MM3 (ref 0–0.03)
IMM GRANULOCYTES NFR BLD: 1.9 % (ref 0–5)
LYMPHOCYTES # BLD AUTO: 0.81 10*3/MM3 (ref 0.72–4.86)
LYMPHOCYTES NFR BLD AUTO: 6.7 % (ref 15–45)
MCH RBC QN AUTO: 28 PG (ref 28–32)
MCHC RBC AUTO-ENTMCNC: 32 G/DL (ref 33–36)
MCV RBC AUTO: 87.3 FL (ref 82–95)
MONOCYTES # BLD AUTO: 0.52 10*3/MM3 (ref 0.19–1.3)
MONOCYTES NFR BLD AUTO: 4.3 % (ref 4–12)
NEUTROPHILS # BLD AUTO: 10.24 10*3/MM3 (ref 1.87–8.4)
NEUTROPHILS NFR BLD AUTO: 84.9 % (ref 39–78)
NRBC BLD MANUAL-RTO: 0 /100 WBC (ref 0–0)
PLATELET # BLD AUTO: 434 10*3/MM3 (ref 130–400)
PMV BLD AUTO: 10.1 FL (ref 6–12)
POTASSIUM BLD-SCNC: 4.7 MMOL/L (ref 3.5–5.3)
RBC # BLD AUTO: 3.22 10*6/MM3 (ref 4.8–5.9)
SODIUM BLD-SCNC: 138 MMOL/L (ref 135–145)
WBC NRBC COR # BLD: 12.06 10*3/MM3 (ref 4.8–10.8)

## 2018-11-07 PROCEDURE — 80048 BASIC METABOLIC PNL TOTAL CA: CPT | Performed by: NURSE PRACTITIONER

## 2018-11-07 PROCEDURE — 97530 THERAPEUTIC ACTIVITIES: CPT

## 2018-11-07 PROCEDURE — 97110 THERAPEUTIC EXERCISES: CPT

## 2018-11-07 PROCEDURE — 85025 COMPLETE CBC W/AUTO DIFF WBC: CPT | Performed by: NURSE PRACTITIONER

## 2018-11-07 RX ORDER — ONDANSETRON 2 MG/ML
4 INJECTION INTRAMUSCULAR; INTRAVENOUS EVERY 6 HOURS PRN
Status: DISCONTINUED | OUTPATIENT
Start: 2018-11-07 | End: 2018-11-13 | Stop reason: HOSPADM

## 2018-11-07 RX ADMIN — HYDROCHLOROTHIAZIDE 25 MG: 25 TABLET ORAL at 09:36

## 2018-11-07 RX ADMIN — POLYETHYLENE GLYCOL 3350 17 G: 17 POWDER, FOR SOLUTION ORAL at 09:36

## 2018-11-07 RX ADMIN — POTASSIUM CHLORIDE 40 MEQ: 750 CAPSULE, EXTENDED RELEASE ORAL at 09:36

## 2018-11-07 RX ADMIN — FAMOTIDINE 20 MG: 20 TABLET, FILM COATED ORAL at 09:36

## 2018-11-07 RX ADMIN — CARBAMIDE PEROXIDE 6.5% 10 DROP: 6.5 LIQUID AURICULAR (OTIC) at 21:02

## 2018-11-07 RX ADMIN — APIXABAN 2.5 MG: 2.5 TABLET, FILM COATED ORAL at 06:39

## 2018-11-07 RX ADMIN — APIXABAN 2.5 MG: 2.5 TABLET, FILM COATED ORAL at 18:28

## 2018-11-07 RX ADMIN — BISACODYL 10 MG: 5 TABLET, COATED ORAL at 06:39

## 2018-11-07 RX ADMIN — BISACODYL 10 MG: 10 SUPPOSITORY RECTAL at 10:19

## 2018-11-07 RX ADMIN — HYDROCODONE BITARTRATE AND ACETAMINOPHEN 1 TABLET: 5; 325 TABLET ORAL at 09:37

## 2018-11-07 RX ADMIN — QUETIAPINE FUMARATE 50 MG: 25 TABLET, FILM COATED ORAL at 21:02

## 2018-11-07 NOTE — THERAPY TREATMENT NOTE
Acute Care - Physical Therapy Treatment Note  [facility]     Patient Name: Burke Reinoso Jr.  : 1930  MRN: [id number]  Today's Date: 2018  Onset of Illness/Injury or Date of Surgery: 18  Date of Referral to PT: 18  Referring Physician: Dr. Colon    Admit Date: 2018    Visit Dx:    ICD-10-CM ICD-9-CM   1. Closed displaced comminuted fracture of shaft of left femur, initial encounter (CMS/[facility]) S72.352A 821.01   2. Dysphagia, unspecified type R13.10 787.20   3. Impaired mobility Z74.09 799.89     Patient Active Problem List   Diagnosis   • Closed displaced intertrochanteric fracture of left femur (CMS/[facility])   • Closed displaced comminuted fracture of shaft of left femur (CMS/[facility])       Therapy Treatment          Rehabilitation Treatment Summary     Row Name 18 1009 18 0900          Treatment Time/Intention    Discipline physical therapy assistant  - physical therapy assistant  -     Document Type therapy note (daily note)  -[facility]  --     Subjective Information complains of;weakness;fatigue;pain  -[facility]  --     Comment  -- pt eating  -     Existing Precautions/Restrictions fall;left;weight bearing   TTWB LLE, progress slowly per   -2  --     Recorded by [] Caren Nation, PTA 18 1010  [2] Caren Nation, PTA 18 1101 [] Caren Nation, PTA 18 0914     Row Name 18 1009             Mobility Assessment/Intervention    Extremity Weight-bearing Status left lower extremity  -      Left Lower Extremity (Weight-bearing Status) toe touch weight-bearing (TTWB)  -      Recorded by [] Caren Nation, PTA 18 1101      Row Name 18 1009             Bed Mobility Assessment/Treatment    Supine-Sit [place] (Bed Mobility) maximum assist (25% patient effort);moderate assist (50% patient effort);2 person assist;verbal cues  -      Sit-Supine [place] (Bed Mobility) maximum assist (25% patient effort);2 person  assist;verbal cues  -      Recorded by [] Caren Nation, PTA 11/07/18 1103      Row Name 11/07/18 1009             Transfer Assessment/Treatment    Comment (Transfers) not appropriate to attempt  -      Recorded by [] Caren Nation, PTA 11/07/18 1109      Row Name 11/07/18 1009             Therapeutic Exercise    Comment (Therapeutic Exercise) A-AAROM RLE, P-AAROM LLE sitting EOB  -      Recorded by [] Caren Nation, PTA 11/07/18 1109      Row Name 11/07/18 1009             Positioning and Restraints    Pre-Treatment Position in bed  -      Post Treatment Position bed  -AH      In Bed fowlers;call light within reach;encouraged to call for assist;exit alarm on;with family/caregiver;SCD pump applied;heels elevated  -      Recorded by [] Caren Nation, PTA 11/07/18 1109      Row Name 11/07/18 1009             Pain Assessment    Additional Documentation Pain Scale: FACES Pre/Post-Treatment (Group)  -      Recorded by [] Caren Nation, PTA 11/07/18 1109      Row Name 11/07/18 1009             Pain Scale: Numbers Pre/Post-Treatment    Pain Location - Side Left  -AH      Pain Location - Orientation lower  -AH      Pain Location extremity  -      Recorded by [] Caren Nation, PTA 11/07/18 1109      Row Name 11/07/18 1009             Pain Scale: FACES Pre/Post-Treatment    Pain: FACES Scale, Pretreatment 6-->hurts even more  -AH      Pre/Post Treatment Pain Comment with bed mobility  -      Recorded by [] Caren Nation, PTA 11/07/18 1109      Row Name                Wound 10/27/18 1052 Left hip incision    Wound - Properties Group Date first assessed: 10/27/18 [AD] Time first assessed: 1052 [AD] Side: Left [AD] Location: hip [AD] Type: incision [AD] Recorded by:  [AD] Fernando Rajput RN 10/27/18 1052    Row Name                Wound 11/03/18 1237 Left leg incision    Wound - Properties Group Date first assessed: 11/03/18 [LC] Time first assessed: 1237 [LC] Side: Left [LC]  Location: leg [LC] Type: incision [LC] Recorded by:  [LC] Luz Medina RN 11/03/18 1237    Row Name                Wound 11/05/18 0952 Left heel blisters    Wound - Properties Group Date first assessed: 11/05/18 [AW] Time first assessed: 0952 [AW] Present On Admission : picture taken;no [AW] Side: Left [AW] Location: heel [AW] Type: blisters [AW] Recorded by:  [AW] Jaclyn Martinez RN 11/05/18 1520    Row Name                Wound 11/07/18 0439 Left lateral heel blisters    Wound - Properties Group Date first assessed: 11/07/18 [EL] Time first assessed: 0439 [EL] Present On Admission : no;picture taken [EL] Side: Left [EL] Orientation: lateral [EL] Location: heel [EL] Type: blisters [EL] Recorded by:  [EL] Alissa Armendariz, RNA 11/07/18 0440    Row Name                Wound 11/07/18 0440 Left lateral foot blisters    Wound - Properties Group Date first assessed: 11/07/18 [EL] Time first assessed: 0440 [EL] Present On Admission : no;picture taken [EL] Side: Left [EL] Orientation: lateral [EL] Location: foot [EL] Type: blisters [EL] Recorded by:  [EL] Alissa Armendariz, RNA 11/07/18 0440      User Key  (r) = Recorded By, (t) = Taken By, (c) = Cosigned By    Initials Name Effective Dates Discipline     Caren Nation, DELMA 08/02/16 -  PT    Luz Lopez, RN 08/02/16 -  Nurse    Alissa Holt, RNA 06/09/17 -  Nurse    Fernando Morales RN 08/02/17 -  Nurse    Jaclyn Pineda RN 01/12/18 -  Nurse          Wound 10/27/18 1052 Left hip incision (Active)   Dressing Appearance dry;intact;no drainage 11/6/2018  8:55 PM   Closure Staples 11/6/2018  8:55 PM   Drainage Amount none 11/6/2018  8:55 PM   Dressing Care, Wound open to air 11/6/2018  8:55 PM       Wound 11/03/18 1237 Left leg incision (Active)   Dressing Appearance dry;intact;no drainage 11/6/2018  8:55 PM   Closure MONICA 11/6/2018  8:55 PM   Base dressing in place, unable to visualize 11/6/2018  8:55 PM   Drainage Amount none  11/6/2018  8:55 PM   Dressing Care, Wound gauze 11/6/2018  8:55 PM       Wound 11/05/18 0952 Left heel blisters (Active)   Dressing Appearance open to air 11/6/2018  8:55 PM   Periwound redness 11/6/2018  8:55 PM   Drainage Amount none 11/6/2018  8:55 PM   Dressing Care, Wound open to air 11/6/2018  8:55 PM       Wound 11/07/18 0439 Left lateral heel blisters (Active)   Wound Image    11/7/2018  4:41 AM       Wound 11/07/18 0440 Left lateral foot blisters (Active)   Wound Image   11/7/2018  4:41 AM             Physical Therapy Education     Title: PT OT SLP Therapies (Active)     Topic: Physical Therapy (Active)     Point: Mobility training (Done)    Learning Progress Summary     Learner Status Readiness Method Response Comment Documented by    Patient Done Acceptance E VU Pt educated on use of bed rails to assist with rolling.  11/04/18 0817          Point: Home exercise program (Active)    Learning Progress Summary     Learner Status Readiness Method Response Comment Documented by    Patient Active Acceptance E,D NR benefit of exercise, plan of [facility] 11/05/18 1134    Family Active Acceptance E,D NR benefit of exercise, plan of [facility] 11/05/18 1134          Point: Precautions (Active)    Learning Progress Summary     Learner Status Readiness Method Response Comment Documented by    Patient Active Acceptance E NR   11/07/18 0539    Family Done Acceptance E,TB,D VU,DU off loading heels  11/07/18 1109                      User Key     Initials Effective Dates Name Provider Type Discipline     08/02/16 -  Caren Nation PTA Physical Therapy Assistant PT     06/22/15 -  Mary Sylvester PTA Physical Therapy Assistant PT     06/09/17 -  Alissa Armendariz RNA Registered Nurse Nurse     10/08/18 -  Andrea Roe, LAURA Student PT Student PT                    PT Recommendation and Plan     Plan of Care Reviewed With: patient  Progress: no change  Outcome Summary: pt trans to EOB MOD-MAX OF 2,  sat EOB 20 minutes,performed BLE ROM,trans back to bed max of 2. pt will need SNF          Outcome Measures     Row Name 11/07/18 1100 11/06/18 1000 11/05/18 1100       How much help from another person do you currently need...    Turning from your back to your side while in flat bed without using bedrails? 2  -AH 1  -KJ 2  -CW    Moving from lying on back to sitting on the side of a flat bed without bedrails? 2  - 1  -KJ 2  -CW    Moving to and from a bed to a chair (including a wheelchair)? 1  - 1  -KJ 2  -CW    Standing up from a chair using your arms (e.g., wheelchair, bedside chair)? 1  - 1  -KJ 2  -CW    Climbing 3-5 steps with a railing? 1  - 1  -KJ 1  -CW    To walk in hospital room? 1  - 1  -KJ 1  -CW    AM-PAC 6 Clicks Score 8  - 6  -KJ 10  -CW       Functional Assessment    Outcome Measure Options AM-PAC 6 Clicks Basic Mobility (PT)  - AM-PAC 6 Clicks Basic Mobility (PT)  -KJ AM-PAC 6 Clicks Basic Mobility (PT)  -CW      User Key  (r) = Recorded By, (t) = Taken By, (c) = Cosigned By    Initials Name Provider Type    Caren Ellington, DELMA Physical Therapy Assistant    Karen Brooks, PTA Physical Therapy Assistant    Mary Escobar, PTA Physical Therapy Assistant           Time Calculation:         PT Charges     Row Name 11/07/18 1112             Time Calculation    Start Time 1009  -      Stop Time 1039  -      Time Calculation (min) 30 min  -      PT Received On 11/07/18  -      PT Goal Re-Cert Due Date 11/14/18  -         Time Calculation- PT    Total Timed Code Minutes- PT 30 minute(s)  -         Timed Charges    63730 - PT Therapeutic Activity Minutes 30  -        User Key  (r) = Recorded By, (t) = Taken By, (c) = Cosigned By    Initials Name Provider Type    Caren Ellington PTA Physical Therapy Assistant        Therapy Suggested Charges     Code   Minutes Charges    67929 (CPT®) Hc Pt Neuromusc Re Education Ea 15 Min      17924 (CPT®) Hc Pt Ther Proc Ea  15 Min      17858 (CPT®) Hc Gait Training Ea 15 Min      94632 (CPT®) Hc Pt Therapeutic Act Ea 15 Min 30 2    34654 (CPT®) Hc Pt Manual Therapy Ea 15 Min      11556 (CPT®) Hc Pt Iontophoresis Ea 15 Min      94809 (CPT®) Hc Pt Elec Stim Ea-Per 15 Min      04136 (CPT®) Hc Pt Ultrasound Ea 15 Min      49144 (CPT®) Hc Pt Self Care/Mgmt/Train Ea 15 Min      60677 (CPT®) Hc Pt Prosthetic (S) Train Initial Encounter, Each 15 Min      85108 (CPT®) Hc Pt Orthotic(S)/Prosthetic(S) Encounter, Each 15 Min      25016 (CPT®) Hc Orthotic(S) Mgmt/Train Initial Encounter, Each 15min      Total  30 2        Therapy Charges for Today     Code Description Service Date Service Provider Modifiers Qty    75410495444 HC PT THER PROC EA 15 MIN 11/6/2018 Caren Nation, PTA GP 2    27248910066 HC PT THERAPEUTIC ACT EA 15 MIN 11/7/2018 Caren Nation, PTA GP 2          PT G-Codes  Outcome Measure Options: AM-PAC 6 Clicks Basic Mobility (PT)  AM-PAC 6 Clicks Score: 8  Functional Limitation: Mobility: Walking and moving around  Mobility: Walking and Moving Around Current Status (): At least 60 percent but less than 80 percent impaired, limited or restricted  Mobility: Walking and Moving Around Goal Status (): At least 40 percent but less than 60 percent impaired, limited or restricted    Caren Nation PTA  11/7/2018

## 2018-11-07 NOTE — PROGRESS NOTES
Continued Stay Note   [facility]     Patient Name: Burke Reinoso Jr.  MRN: [id number]  Today's Date: 11/7/2018    Admit Date: 11/1/2018          Discharge Plan     Row Name 11/07/18 1156       Plan    Plan [facility] pending precert    Patient/Family in Agreement with Plan yes    Plan Comments Spoke with Jenna at [facility] [phone]. She has been in contact with insurance and is still waiting to hear a decision.              Discharge Codes    No documentation.           FAZAL Chan

## 2018-11-07 NOTE — PLAN OF CARE
Problem: Patient Care Overview  Goal: Plan of Care Review  Outcome: Ongoing (interventions implemented as appropriate)   11/07/18 6043   Coping/Psychosocial   Plan of Care Reviewed With patient;family   Plan of Care Review   Progress no change   OTHER   Outcome Summary Pts family reports pt is eating fairly well. States he eats close to 50% of each meal, maybe more for dinner. Family states pt does not like the Boost Plus however might like it mixed with ice cream, adjusted supplement. Encouraged intake, will continue to follow.

## 2018-11-07 NOTE — PROGRESS NOTES
[facility] Medicine Services  INPATIENT PROGRESS NOTE    Patient Name: Burke Reinoso Jr.  Date of Admission: 11/1/2018  Today's Date: 11/07/18  Length of Stay: 5  Primary Care Physician: Mg Colmenares MD    Subjective   Chief Complaint: left hip pain    HPI   Laying in bed.  No family present.  Asleep peacefully.  Awoken patient for assessment.  He shakes his head yes to being ok and denies any pain.  Wants his coffee with breakfast.  He is not having any bleeding.     Review of Systems   Constitutional: Positive for activity change and fatigue. Negative for appetite change, chills and fever.   HENT: Negative for hearing loss, nosebleeds, tinnitus and trouble swallowing.    Eyes: Negative for visual disturbance.   Respiratory: Negative for cough, chest tightness, shortness of breath and wheezing.    Cardiovascular: Negative for chest pain, palpitations and leg swelling.   Gastrointestinal: Negative for abdominal distention, abdominal pain, blood in stool, constipation, diarrhea, nausea and vomiting.   Endocrine: Negative for cold intolerance, heat intolerance, polydipsia, polyphagia and polyuria.   Genitourinary: Negative for decreased urine volume, difficulty urinating, dysuria, flank pain, frequency and hematuria.   Musculoskeletal: Positive for gait problem, joint swelling and myalgias. Negative for arthralgias.   Skin: Negative for rash.   Allergic/Immunologic: Negative for immunocompromised state.   Neurological: Positive for weakness. Negative for dizziness, syncope, light-headedness and headaches.   Hematological: Negative for adenopathy. Does not bruise/bleed easily.   Psychiatric/Behavioral: Positive for confusion. Negative for sleep disturbance. The patient is not nervous/anxious.       All pertinent negatives and positives are as above. All other systems have been reviewed and are negative unless otherwise stated.     Objective    Temp:  [97.1 °F (36.2 °C)-98.4  °F (36.9 °C)] 97.1 °F (36.2 °C)  Heart Rate:  [82-98] 83  Resp:  [18-20] 20  BP: (108-166)/() 166/100     Physical Exam   Constitutional: He appears well-developed and well-nourished. He appears lethargic.   Lying in bed. NAD.   HENT:   Head: Normocephalic and atraumatic.   Eyes: Pupils are equal, round, and reactive to light. Conjunctivae and EOM are normal.   Neck: Neck supple. No JVD present. No thyromegaly present.   Cardiovascular: Normal rate, regular rhythm, normal heart sounds and intact distal pulses.  Exam reveals no gallop and no friction rub.    No murmur heard.  Pulmonary/Chest: Effort normal and breath sounds normal. No respiratory distress. He has no wheezes. He has no rales. He exhibits no tenderness.   Abdominal: Soft. Bowel sounds are normal. He exhibits no distension. There is no tenderness. There is no rebound and no guarding.   Genitourinary:   Genitourinary Comments: Incontinent     Musculoskeletal: Normal range of motion. He exhibits no edema, tenderness or deformity.   Bruising noted to left hip/groin   Lymphadenopathy:     He has no cervical adenopathy.   Neurological: He appears lethargic. He is disoriented.   Skin: Skin is warm and dry. No rash noted.   Psychiatric: He has a normal mood and affect. His behavior is normal. Judgment and thought content normal.   Nursing note and vitals reviewed.    Results Review:  I have reviewed the labs, radiology results, and diagnostic studies.    Laboratory Data:     Results from last 7 days  Lab Units 11/07/18  0823 11/06/18 0437 11/05/18  0710   WBC 10*3/mm3 12.06* 11.45* 13.44*   HEMOGLOBIN g/dL 9.0* 8.7* 8.8*   HEMATOCRIT % 28.1* 27.3* 26.5*   PLATELETS 10*3/mm3 434* 312 344        Results from last 7 days  Lab Units 11/07/18  0823 11/06/18 0437 11/05/18  0710  11/01/18  2337   SODIUM mmol/L 138 138 137  < > 135   POTASSIUM mmol/L 4.7 3.5 3.2*  < > 4.6   CHLORIDE mmol/L 96* 97* 94*  < > 94*   CO2 mmol/L 31.0 33.0* 33.0*  < > 31.0   BUN  mg/dL 23* 28* 26*  < > 26*   CREATININE mg/dL 0.75 0.85 0.86  < > 0.87   CALCIUM mg/dL 8.9 8.5 8.3*  < > 8.6   BILIRUBIN mg/dL  --   --   --   --  1.2*   ALK PHOS U/L  --   --   --   --  80   ALT (SGPT) U/L  --   --   --   --  37   AST (SGOT) U/L  --   --   --   --  47*   GLUCOSE mg/dL 98 105* 102*  < > 129*   < > = values in this interval not displayed.    Results from last 7 days  Lab Units 11/05/18  0710 11/03/18  0749 11/02/18  0440   INR  1.17* 1.26* 1.46*     Radiology Data:   Imaging Results (last 24 hours)     ** No results found for the last 24 hours. **        I have reviewed the patient's current medications.     Assessment/Plan     Assessment:  1.  Acute traumatic comminuted periprosthetic fracture along the proximal diaphysis of the femur on 11/3/18  2.  Frequent falls  3.  Recent left intertrochanteric hip fracture with trochanteric fixation nailing on 10/27/18  4.  Acute blood loss anemia from recent surgery   5.  Acutely anticoagulated with Eliquis for DVT prophylaxis secondary to #3  6.  Constipation, chronic  7.  Mild protein malnutrition   8.  Essential hypertension   9.  Leukocytosis, suspected reactive  10.  Hypokalemia     Plan:  1.  POD #4 From removal of pre-existing trochanteric fixation nail and placement of long trochanteric fixation nail left femur and cabling of femoral shaft fracture  2.  PT/OT, slowly  3.  Transfused 4 units PRBC and 4 units FFP total since admission   4.  Bowel regimen   5.  CBC and BMP in AM  6.  SCDs for DVT prophylaxis  7.   following: [facility] - precert  8.  PT/INR in AM  9.  Chest xray normal  10.  Has not been out of bed since surgery, Dr. Colon wants slow therapy due to severity of the fracture.  Patient doesn't follow directions well.  11.  Replace K+  12.  Eliqius 2.5 mg BID for DVT prophylaxis     Discharge Planning: I expect the patient to be discharged to rehab/SNF in 1-2 days.    Jacek Kinsey, APRN   11/07/18   9:23  AM     I personally evaluated and examined the patient in conjunction with ALKA Ellis and agree with the assessment, treatment plan, and disposition of the patient as recorded by her. My history, exam, and further recommendations are:     Discussed with his nurse, Carley.     Started Eliquis yesterday for prophylaxis. No problems with the operative site. Dr. Colon looked today as well according to his note.     Hemoglobin stable/improving.     Still awaiting insurance decision for placement.     Mingo Ramon,   11/07/18  1:22 PM

## 2018-11-07 NOTE — PROGRESS NOTES
Patient's post fixation of a severe left femoral shaft fracture.  His wound is looking good swelling is improving and is able set the side of the bed continue with physical therapy

## 2018-11-07 NOTE — THERAPY TREATMENT NOTE
Acute Care - Physical Therapy Treatment Note  [facility]     Patient Name: Burke Reinoso Jr.  : 1930  MRN: [id number]  Today's Date: 2018  Onset of Illness/Injury or Date of Surgery: 18  Date of Referral to PT: 18  Referring Physician: Dr. Colon    Admit Date: 2018    Visit Dx:    ICD-10-CM ICD-9-CM   1. Closed displaced comminuted fracture of shaft of left femur, initial encounter (CMS/[facility]) S72.352A 821.01   2. Dysphagia, unspecified type R13.10 787.20   3. Impaired mobility Z74.09 799.89     Patient Active Problem List   Diagnosis   • Closed displaced intertrochanteric fracture of left femur (CMS/[facility])   • Closed displaced comminuted fracture of shaft of left femur (CMS/[facility])       Therapy Treatment          Rehabilitation Treatment Summary     Row Name 18 1440 18 1009 18 0900       Treatment Time/Intention    Discipline physical therapy assistant  - physical therapy assistant  - physical therapy assistant  -    Document Type therapy note (daily note)  -[facility] therapy note (daily note)  -[facility]  --    Subjective Information complains of;weakness;fatigue;pain  -[facility] complains of;weakness;fatigue;pain  -[facility]  --    Comment  --  -- pt eating  -    Existing Precautions/Restrictions fall;weight bearing;left   TTWB LLE, [facility] slowly [facility];left;weight bearing   TTWB LLE, [facility]  --    Treatment Considerations/Comments TTWB LLE,progress slowly per   [facility]  --  --    Recorded by [] Caren Nation, PTA 18 1441  [2] Caren Nation, PTA 18 1503 [] Caren Nation, PTA 18 1010  [2] Caren Nation, PTA 18 1101 [] Caren Nation, PTA 18 0914    Row Name 18 1440 18 1009          Mobility Assessment/Intervention    Extremity Weight-bearing Status left lower extremity  - left lower extremity  -AH     Left Lower Extremity (Weight-bearing Status) toe touch  weight-bearing (TTWB)  - toe touch weight-bearing (TTWB)  -     Recorded by [] Caren Nation, PTA 11/07/18 1503 [] Caren Nation, PTA 11/07/18 1101     Row Name 11/07/18 1009             Bed Mobility Assessment/Treatment    Supine-Sit [place] (Bed Mobility) maximum assist (25% patient effort);moderate assist (50% patient effort);2 person assist;verbal cues  -      Sit-Supine [place] (Bed Mobility) maximum assist (25% patient effort);2 person assist;verbal cues  -      Recorded by [] Caren Nation, PTA 11/07/18 1103      Row Name 11/07/18 1009             Transfer Assessment/Treatment    Comment (Transfers) not appropriate to attempt  -      Recorded by [] Caren Nation, PTA 11/07/18 1109      Row Name 11/07/18 1440             Motor Skills Assessment/Interventions    Additional Documentation Therapeutic Exercise (Group)  -      Recorded by [] Caren Nation, PTA 11/07/18 1503      Row Name 11/07/18 1440             Therapeutic Exercise    Therapeutic Exercise supine, lower extremities  -      Additional Documentation Therapeutic Exercise (Row)  -      02951 - PT Therapeutic Exercise Minutes 23  -AH      Recorded by [] Caren Nation, PTA 11/07/18 1503      Row Name 11/07/18 1440             Lower Extremity Supine Therapeutic Exercise    Performed, Supine Lower Extremity (Therapeutic Exercise) hip abduction/adduction;SAQ (short arc quad) over bolster;quadriceps sets;ankle pumps;heel slides  -      Exercise Type, Supine Lower Extremity (Therapeutic Exercise) AROM (active range of motion);AAROM (active assistive range of motion)  -      Sets/Reps Detail, Supine Lower Extremity (Therapeutic Exercise) 20  -      Comment, Supine Lower Extremity (Therapeutic Exercise) BLE, A-AAROM RLE, AAROM LLE  -      Recorded by [] Caren Nation, PTA 11/07/18 1503      Row Name 11/07/18 1009             Therapeutic Exercise    Comment (Therapeutic Exercise) A-AAROM RLE, P-AAROM  LLE sitting EOB  -AH      Recorded by [] Caren Nation, PTA 11/07/18 1109      Row Name 11/07/18 1440 11/07/18 1009          Positioning and Restraints    Pre-Treatment Position in bed  -AH in bed  -AH     Post Treatment Position bed  -AH bed  -AH     In Bed fowlers;call light within reach;encouraged to call for assist;exit alarm on;with family/caregiver;SCD pump applied;waffle boots/both  -AH fowlers;call light within reach;encouraged to call for assist;exit alarm on;with family/caregiver;SCD pump applied;heels elevated  -AH     Recorded by [] Caren Nation, PTA 11/07/18 1503 [] Caren Nation, PTA 11/07/18 1109     Row Name 11/07/18 1009             Pain Assessment    Additional Documentation Pain Scale: FACES Pre/Post-Treatment (Group)  -      Recorded by [] Caren Nation, PTA 11/07/18 1109      Row Name 11/07/18 1440 11/07/18 1009          Pain Scale: Numbers Pre/Post-Treatment    Pain Location - Side Left  -AH Left  -AH     Pain Location - Orientation lower  -AH lower  -AH     Pain Location extremity  -AH extremity  -AH     Recorded by [] Caren Nation, PTA 11/07/18 1503 [] Caren Nation, PTA 11/07/18 1109     Row Name 11/07/18 1440 11/07/18 1009          Pain Scale: FACES Pre/Post-Treatment    Pain: FACES Scale, Pretreatment 6-->hurts even more  -AH 6-->hurts even more  -AH     Pre/Post Treatment Pain Comment with ROM  -AH with bed mobility  -     Recorded by [] Caren Nation, PTA 11/07/18 1503 [] Caren Nation, PTA 11/07/18 1109     Row Name                Wound 10/27/18 1052 Left hip incision    Wound - Properties Group Date first assessed: 10/27/18 [AD] Time first assessed: 1052 [AD] Side: Left [AD] Location: hip [AD] Type: incision [AD] Recorded by:  [AD] Fernando Rajput RN 10/27/18 1052    Row Name                Wound 11/03/18 1237 Left leg incision    Wound - Properties Group Date first assessed: 11/03/18 [LC] Time first assessed: 1237 [LC] Side: Left [LC] Location:  leg [LC] Type: incision [LC] Recorded by:  [LC] Luz Medina RN 11/03/18 1237    Row Name                Wound 11/05/18 0952 Left heel blisters    Wound - Properties Group Date first assessed: 11/05/18 [AW] Time first assessed: 0952 [AW] Present On Admission : picture taken;no [AW] Side: Left [AW] Location: heel [AW] Type: blisters [AW] Recorded by:  [AW] Jaclyn Martinez RN 11/05/18 1520    Row Name                Wound 11/07/18 0439 Left lateral heel blisters    Wound - Properties Group Date first assessed: 11/07/18 [EL] Time first assessed: 0439 [EL] Present On Admission : no;picture taken [EL] Side: Left [EL] Orientation: lateral [EL] Location: heel [EL] Type: blisters [EL] Recorded by:  [EL] Alissa Armendariz, RNA 11/07/18 0440    Row Name                Wound 11/07/18 0440 Left lateral foot blisters    Wound - Properties Group Date first assessed: 11/07/18 [EL] Time first assessed: 0440 [EL] Present On Admission : no;picture taken [EL] Side: Left [EL] Orientation: lateral [EL] Location: foot [EL] Type: blisters [EL] Recorded by:  [EL] Alissa Armendariz, RNA 11/07/18 0440      User Key  (r) = Recorded By, (t) = Taken By, (c) = Cosigned By    Initials Name Effective Dates Discipline     Caren Nation, DELMA 08/02/16 -  PT    Luz Lopez, RN 08/02/16 -  Nurse    Alissa Holt, RNA 06/09/17 -  Nurse    Fernando Morales RN 08/02/17 -  Nurse    Jaclyn Pineda RN 01/12/18 -  Nurse          Wound 10/27/18 1052 Left hip incision (Active)   Dressing Appearance dry;intact;no drainage 11/7/2018  9:37 AM   Closure [place] 11/7/2018  9:37 AM   Drainage Amount none 11/7/2018  9:37 AM   Dressing Care, Wound open to air 11/7/2018  9:37 AM       Wound 11/03/18 1237 Left leg incision (Active)   Dressing Appearance dry;intact;no drainage 11/7/2018  9:37 AM   Closure MONICA 11/6/2018  8:55 PM   Base dressing in place, unable to visualize 11/6/2018  8:55 PM   Drainage Amount none 11/7/2018   9:37 AM   Dressing Care, Wound dressing changed;gauze;low-adherent 11/7/2018  9:37 AM       Wound 11/05/18 0952 Left heel blisters (Active)   Dressing Appearance open to air 11/7/2018  9:37 AM   Periwound redness 11/7/2018  9:37 AM   Drainage Amount none 11/7/2018  9:37 AM   Dressing Care, Wound open to air 11/6/2018  8:55 PM       Wound 11/07/18 0439 Left lateral heel blisters (Active)   Wound Image    11/7/2018  4:41 AM   Drainage Amount none 11/7/2018  9:37 AM   Dressing Care, Wound open to air 11/7/2018  9:37 AM       Wound 11/07/18 0440 Left lateral foot blisters (Active)   Wound Image   11/7/2018  4:41 AM   Drainage Amount none 11/7/2018  9:37 AM   Dressing Care, Wound open to air 11/7/2018  9:37 AM             Physical Therapy Education     Title: PT OT SLP Therapies (Active)     Topic: Physical Therapy (Active)     Point: Mobility training (Done)    Learning Progress Summary     Learner Status Readiness Method Response Comment Documented by    Patient Done Acceptance E VU Pt educated on use of bed rails to assist with rolling.  11/04/18 0817          Point: Home exercise program (Active)    Learning Progress Summary     Learner Status Readiness Method Response Comment Documented by    Patient Active Acceptance E,D NR benefit of exercise, plan of [facility] 11/05/18 1134    Family Active Acceptance E,D NR benefit of exercise, plan of [facility] 11/05/18 1134          Point: Precautions (Active)    Learning Progress Summary     Learner Status Readiness Method Response Comment Documented by    Patient Active Acceptance E NR   11/07/18 0539    Family Done Acceptance E,TB,D VU,DU off loading heels  11/07/18 1109                      User Key     Initials Effective Dates Name Provider Type Discipline     08/02/16 -  Caren Nation PTA Physical Therapy Assistant PT     06/22/15 -  Mary Sylvester PTA Physical Therapy Assistant PT     06/09/17 -  Alissa Armendariz RNA Registered Nurse Nurse     DARIEL 10/08/18 -  Andrea Roe, PT Student PT Student PT                    PT Recommendation and Plan     Plan of Care Reviewed With: patient  Progress: no change  Outcome Summary: pt trans to EOB MOD-MAX OF 2, sat EOB 20 minutes,performed BLE ROM,trans back to bed max of 2. pt will need SNF          Outcome Measures     Row Name 11/07/18 1100 11/06/18 1000 11/05/18 1100       How much help from another person do you currently need...    Turning from your back to your side while in flat bed without using bedrails? 2  -AH 1  -KJ 2  -CW    Moving from lying on back to sitting on the side of a flat bed without bedrails? 2  - 1  -KJ 2  -CW    Moving to and from a bed to a chair (including a wheelchair)? 1  - 1  -KJ 2  -CW    Standing up from a chair using your arms (e.g., wheelchair, bedside chair)? 1  - 1  -KJ 2  -CW    Climbing 3-5 steps with a railing? 1  - 1  -KJ 1  -CW    To walk in hospital room? 1  - 1  -KJ 1  -CW    AM-PAC 6 Clicks Score 8  - 6  -KJ 10  -CW       Functional Assessment    Outcome Measure Options AM-PAC 6 Clicks Basic Mobility (PT)  -AH AM-PAC 6 Clicks Basic Mobility (PT)  -KJ AM-PAC 6 Clicks Basic Mobility (PT)  -CW      User Key  (r) = Recorded By, (t) = Taken By, (c) = Cosigned By    Initials Name Provider Type     Caren Nation, PTA Physical Therapy Assistant    Karen Brooks, PTA Physical Therapy Assistant    Mary Escobar, PTA Physical Therapy Assistant           Time Calculation:         PT Charges     Row Name 11/07/18 1503 11/07/18 1440 11/07/18 1112       Time Calculation    Start Time 1440  -  -- 1009  -    Stop Time 1503  -  -- 1039  -    Time Calculation (min) 23 min  -  -- 30 min  -    PT Received On  --  -- 11/07/18  [facility]    PT Goal Re-Cert Due Date  --  -- 11/14/18  -       Time Calculation- PT    Total Timed Code Minutes- PT 23 minute(s)  -  -- 30 minute(s)  -       Timed Charges    24373 - PT Therapeutic Exercise Minutes  -- 23   -  --    20344 - PT Therapeutic Activity Minutes  --  -- 30  -      User Key  (r) = Recorded By, (t) = Taken By, (c) = Cosigned By    Initials Name Provider Type     Caren Nation PTA Physical Therapy Assistant        Therapy Suggested Charges     Code   Minutes Charges    88839 (CPT®) Hc Pt Neuromusc Re Education Ea 15 Min      31193 (CPT®) Hc Pt Ther Proc Ea 15 Min 23 2    03309 (CPT®) Hc Gait Training Ea 15 Min      36095 (CPT®) Hc Pt Therapeutic Act Ea 15 Min      14164 (CPT®) Hc Pt Manual Therapy Ea 15 Min      41606 (CPT®) Hc Pt Iontophoresis Ea 15 Min      20092 (CPT®) Hc Pt Elec Stim Ea-Per 15 Min      96521 (CPT®) Hc Pt Ultrasound Ea 15 Min      89059 (CPT®) Hc Pt Self Care/Mgmt/Train Ea 15 Min      84361 (CPT®) Hc Pt Prosthetic (S) Train Initial Encounter, Each 15 Min      67418 (CPT®) Hc Pt Orthotic(S)/Prosthetic(S) Encounter, Each 15 Min      17742 (CPT®) Hc Orthotic(S) Mgmt/Train Initial Encounter, Each 15min      Total  23 2        Therapy Charges for Today     Code Description Service Date Service Provider Modifiers Qty    88239638889 HC PT THER PROC EA 15 MIN 11/6/2018 Caren Nation, PTA GP 2    15823124723 HC PT THERAPEUTIC ACT EA 15 MIN 11/7/2018 Caren Nation, PTA GP 2    83092128195 HC PT THER PROC EA 15 MIN 11/7/2018 Caren Nation, PTA GP 2          PT G-Codes  Outcome Measure Options: AM-PAC 6 Clicks Basic Mobility (PT)  AM-PAC 6 Clicks Score: 8  Functional Limitation: Mobility: Walking and moving around  Mobility: Walking and Moving Around Current Status (): At least 60 percent but less than 80 percent impaired, limited or restricted  Mobility: Walking and Moving Around Goal Status (): At least 40 percent but less than 60 percent impaired, limited or restricted    Caren Nation PTA  11/7/2018

## 2018-11-07 NOTE — PLAN OF CARE
Problem: Skin Injury Risk (Adult)  Goal: Skin Health and Integrity  Outcome: Ongoing (interventions implemented as appropriate)      Problem: Patient Care Overview  Goal: Plan of Care Review  Outcome: Ongoing (interventions implemented as appropriate)   11/07/18 1399   Coping/Psychosocial   Plan of Care Reviewed With patient   Plan of Care Review   Progress no change   OTHER   Outcome Summary Oriented to person. Confused. No neuro changes. Safety maintained. PRN pain medication given once, with relief of symptoms. Prevalon boots applied today, mepilexs placed on bilat heels. Left heel blisters. Right heel pressure injury picture taken. Incontinent. Suppository given, BM today. Left hip dressing changed CDI. Will continue to monitor.      Goal: Individualization and Mutuality  Outcome: Ongoing (interventions implemented as appropriate)    Goal: Discharge Needs Assessment  Outcome: Ongoing (interventions implemented as appropriate)    Goal: Interprofessional Rounds/Family Conf  Outcome: Ongoing (interventions implemented as appropriate)      Problem: Fall Risk (Adult)  Goal: Absence of Fall  Outcome: Ongoing (interventions implemented as appropriate)      Problem: Nutrition, Imbalanced: Inadequate Oral Intake (Adult)  Goal: Improved Oral Intake  Outcome: Ongoing (interventions implemented as appropriate)    Goal: Prevent Further Weight Loss  Outcome: Ongoing (interventions implemented as appropriate)      Problem: Hip Arthroplasty (Total, Partial) (Adult)  Goal: Signs and Symptoms of Listed Potential Problems Will be Absent, Minimized or Managed (Hip Arthroplasty)  Outcome: Ongoing (interventions implemented as appropriate)    Goal: Anesthesia/Sedation Recovery  Outcome: Ongoing (interventions implemented as appropriate)

## 2018-11-07 NOTE — PLAN OF CARE
Problem: Patient Care Overview  Goal: Plan of Care Review  Outcome: Ongoing (interventions implemented as appropriate)   11/07/18 1110   Coping/Psychosocial   Plan of Care Reviewed With patient   Plan of Care Review   Progress no change   OTHER   Outcome Summary pt trans to EOB MOD-MAX OF 2, sat EOB 20 minutes,performed BLE ROM,trans back to bed max of 2. pt will need SNF

## 2018-11-07 NOTE — PLAN OF CARE
Problem: Skin Injury Risk (Adult)  Goal: Skin Health and Integrity  Outcome: Ongoing (interventions implemented as appropriate)      Problem: Patient Care Overview  Goal: Plan of Care Review  Outcome: Ongoing (interventions implemented as appropriate)   11/07/18 0539   Coping/Psychosocial   Plan of Care Reviewed With patient   Plan of Care Review   Progress improving   OTHER   Outcome Summary Pt. able to turn better, blisters noted to left lateral heel and left lateral foot-wound nurse consult ordered, dressing cdi, refuses SCD, last BM 11/3-PO Dulcolax to be given this AM.        Problem: Fall Risk (Adult)  Goal: Absence of Fall  Outcome: Ongoing (interventions implemented as appropriate)      Problem: Nutrition, Imbalanced: Inadequate Oral Intake (Adult)  Goal: Improved Oral Intake  Outcome: Ongoing (interventions implemented as appropriate)    Goal: Prevent Further Weight Loss  Outcome: Ongoing (interventions implemented as appropriate)      Problem: Hip Arthroplasty (Total, Partial) (Adult)  Goal: Signs and Symptoms of Listed Potential Problems Will be Absent, Minimized or Managed (Hip Arthroplasty)  Outcome: Ongoing (interventions implemented as appropriate)

## 2018-11-08 LAB
HCT VFR BLD AUTO: 27.3 % (ref 40–52)
HGB BLD-MCNC: 8.9 G/DL (ref 14–18)

## 2018-11-08 PROCEDURE — 85018 HEMOGLOBIN: CPT | Performed by: INTERNAL MEDICINE

## 2018-11-08 PROCEDURE — 97530 THERAPEUTIC ACTIVITIES: CPT

## 2018-11-08 PROCEDURE — 85014 HEMATOCRIT: CPT | Performed by: INTERNAL MEDICINE

## 2018-11-08 PROCEDURE — 97110 THERAPEUTIC EXERCISES: CPT

## 2018-11-08 RX ADMIN — POLYETHYLENE GLYCOL 3350 17 G: 17 POWDER, FOR SOLUTION ORAL at 08:14

## 2018-11-08 RX ADMIN — APIXABAN 2.5 MG: 2.5 TABLET, FILM COATED ORAL at 06:30

## 2018-11-08 RX ADMIN — HYDROCHLOROTHIAZIDE 25 MG: 25 TABLET ORAL at 08:14

## 2018-11-08 RX ADMIN — MAGNESIUM HYDROXIDE 10 ML: 2400 SUSPENSION ORAL at 11:48

## 2018-11-08 RX ADMIN — Medication 3 ML: at 20:30

## 2018-11-08 RX ADMIN — FAMOTIDINE 20 MG: 20 TABLET, FILM COATED ORAL at 08:14

## 2018-11-08 RX ADMIN — APIXABAN 2.5 MG: 2.5 TABLET, FILM COATED ORAL at 17:46

## 2018-11-08 RX ADMIN — POTASSIUM CHLORIDE 40 MEQ: 750 CAPSULE, EXTENDED RELEASE ORAL at 08:14

## 2018-11-08 RX ADMIN — CARBAMIDE PEROXIDE 6.5% 10 DROP: 6.5 LIQUID AURICULAR (OTIC) at 20:30

## 2018-11-08 RX ADMIN — QUETIAPINE FUMARATE 50 MG: 25 TABLET, FILM COATED ORAL at 20:29

## 2018-11-08 NOTE — PLAN OF CARE
Problem: Skin Injury Risk (Adult)  Goal: Skin Health and Integrity  Outcome: Ongoing (interventions implemented as appropriate)      Problem: Patient Care Overview  Goal: Plan of Care Review  Outcome: Ongoing (interventions implemented as appropriate)   11/08/18 3475   Coping/Psychosocial   Plan of Care Reviewed With patient;family   Plan of Care Review   Progress no change   OTHER   Outcome Summary VSS. Pain when turned, but no visible signs when still. Patient hip incision looks slightly red. I looked at the incision with the surgeon. He will keep the patient here to further evaluate the incision. Patient is confused to everything but self. Patient is incont of bowel and urine. Patient bottom is red. Patient heels have multiple blisters. Turned q2h.      Goal: Individualization and Mutuality  Outcome: Ongoing (interventions implemented as appropriate)    Goal: Discharge Needs Assessment  Outcome: Ongoing (interventions implemented as appropriate)    Goal: Interprofessional Rounds/Family Conf  Outcome: Ongoing (interventions implemented as appropriate)      Problem: Fall Risk (Adult)  Goal: Absence of Fall  Outcome: Ongoing (interventions implemented as appropriate)      Problem: Nutrition, Imbalanced: Inadequate Oral Intake (Adult)  Goal: Improved Oral Intake  Outcome: Ongoing (interventions implemented as appropriate)    Goal: Prevent Further Weight Loss  Outcome: Ongoing (interventions implemented as appropriate)      Problem: Hip Arthroplasty (Total, Partial) (Adult)  Goal: Signs and Symptoms of Listed Potential Problems Will be Absent, Minimized or Managed (Hip Arthroplasty)  Outcome: Ongoing (interventions implemented as appropriate)

## 2018-11-08 NOTE — PLAN OF CARE
Problem: Patient Care Overview  Goal: Plan of Care Review  Outcome: Ongoing (interventions implemented as appropriate)   11/08/18 1116   Coping/Psychosocial   Plan of Care Reviewed With patient   Plan of Care Review   Progress no change   OTHER   Outcome Summary Pt requires max assist of 2 for all bed mobility. SB/CGA to maintain sitting balance EOB. Gentle AAROM exercises with BLE as tolerated. Will continue to benefit from therapy to improve functional mobility..

## 2018-11-08 NOTE — PROGRESS NOTES
Patient is post re-fixation of a severe left femur fracture.  He is progressing much better.  Plan continue with physical therapy probable discharge after we can

## 2018-11-08 NOTE — THERAPY TREATMENT NOTE
Acute Care - Physical Therapy Treatment Note  [facility]     Patient Name: Burke Reinoso Jr.  : 1930  MRN: [id number]  Today's Date: 2018  Onset of Illness/Injury or Date of Surgery: 18  Date of Referral to PT: 18  Referring Physician: Dr. Colon    Admit Date: 2018    Visit Dx:    ICD-10-CM ICD-9-CM   1. Closed displaced comminuted fracture of shaft of left femur, initial encounter (CMS/[facility]) S72.352A 821.01   2. Dysphagia, unspecified type R13.10 787.20   3. Impaired mobility Z74.09 799.89     Patient Active Problem List   Diagnosis   • Closed displaced intertrochanteric fracture of left femur (CMS/[facility])   • Closed displaced comminuted fracture of shaft of left femur (CMS/[facility])       Therapy Treatment          Rehabilitation Treatment Summary     Row Name 18 1024             Treatment Time/Intention    Discipline physical therapy assistant  -CW      Document Type therapy note (daily note)  -CW2      Subjective Information complains of;pain  -CW2      Mode of Treatment physical therapy  -CW2      Existing Precautions/Restrictions fall;weight bearing   TTWB LLE, progress slowly per Dr Isaiah SANDERS      Treatment Considerations/Comments TTWB LLE; progress slowly per Dr Colon  -PHILOMENA      Recorded by [CW] Mary Sylvester, PTA 18 1026  [CW2] Mary Sylvester, PTA 18 1115      Row Name 18 1024             Mobility Assessment/Intervention    Extremity Weight-bearing Status left lower extremity  -CW      Left Lower Extremity (Weight-bearing Status) toe touch weight-bearing (TTWB)  -CW      Recorded by [CW] Mary Sylvester, PTA 18 1115      Row Name 18 1024             Bed Mobility Assessment/Treatment    Supine-Sit [place] (Bed Mobility) maximum assist (25% patient effort);2 person assist;verbal cues  -CW      Sit-Supine [place] (Bed Mobility) maximum assist (25% patient effort);2 person assist;verbal cues  -CW      Bed Mobility,  Safety Issues decreased use of legs for bridging/pushing  -CW      Assistive Device (Bed Mobility) draw sheet  -CW      Comment (Bed Mobility) SBA/CGA to sit EOB  -CW      Recorded by [CW] Mary Sylvester, PTA 11/08/18 1115      Row Name 11/08/18 1024             Therapeutic Exercise    Exercise Type (Therapeutic Exercise) AAROM (active assistive range of motion);AROM (active range of motion)  -CW      Position (Therapeutic Exercise) seated  -CW      Sets/Reps (Therapeutic Exercise) 10  -CW      Recorded by [CW] Mary Sylvester, PTA 11/08/18 1115      Row Name 11/08/18 1024             Positioning and Restraints    Pre-Treatment Position in bed  -CW      Post Treatment Position bed  -CW      In Bed fowlers;call light within reach;encouraged to call for assist;exit alarm on;SCD pump applied;waffle boots/both  -CW      Recorded by [CW] Mary Sylvester, PTA 11/08/18 1115      Row Name 11/08/18 1024             Pain Scale: Numbers Pre/Post-Treatment    Pain Location - Side Left  -CW      Pain Location - Orientation lower  -CW      Pain Location extremity  -CW      Pain Intervention(s) Repositioned  -CW      Recorded by [CW] Mary Sylvester, PTA 11/08/18 1115      Row Name 11/08/18 1024             Pain Scale: FACES Pre/Post-Treatment    Pain: FACES Scale, Pretreatment 0-->no hurt  -CW      Pain: FACES Scale, Post-Treatment 4-->hurts little more  -CW      Recorded by [CW] Mary Sylvester, PTA 11/08/18 1115      Row Name                Wound 10/27/18 1052 Left hip incision    Wound - Properties Group Date first assessed: 10/27/18 [AD] Time first assessed: 1052 [AD] Side: Left [AD] Location: hip [AD] Type: incision [AD] Recorded by:  [AD] Fernando Rajput RN 10/27/18 1052    Row Name                Wound 11/03/18 1237 Left leg incision    Wound - Properties Group Date first assessed: 11/03/18 [LC] Time first assessed: 1237 [LC] Side: Left [LC] Location: leg [LC] Type: incision [LC] Recorded by:  [LC]  Luz Medina RN 11/03/18 1237    Row Name                Wound 11/05/18 0952 Left heel blisters    Wound - Properties Group Date first assessed: 11/05/18 [AW] Time first assessed: 0952 [AW] Present On Admission : picture taken;no [AW] Side: Left [AW] Location: heel [AW] Type: blisters [AW] Recorded by:  [AW] Jaclyn Martinez RN 11/05/18 1520    Row Name                Wound 11/07/18 0439 Left lateral heel blisters    Wound - Properties Group Date first assessed: 11/07/18 [EL] Time first assessed: 0439 [EL] Present On Admission : no;picture taken [EL] Side: Left [EL] Orientation: lateral [EL] Location: heel [EL] Type: blisters [EL] Recorded by:  [EL] Alissa Armendariz RNA 11/07/18 0440    Row Name                Wound 11/07/18 0440 Left lateral foot blisters    Wound - Properties Group Date first assessed: 11/07/18 [EL] Time first assessed: 0440 [EL] Present On Admission : no;picture taken [EL] Side: Left [EL] Orientation: lateral [EL] Location: foot [EL] Type: blisters [EL] Recorded by:  [EL] Alissa Armendariz RNA 11/07/18 0440    Row Name                Wound 11/07/18 0700 Right heel blisters    Wound - Properties Group Date first assessed: 11/07/18 [DB] Time first assessed: 0700 [DB] Present On Admission : no;picture taken [DB] Side: Right [DB] Location: heel [DB] Type: blisters [DB2] Recorded by:  [DB] Carley White L, RNA 11/07/18 1840 [DB2] Carley White, RNA 11/07/18 1841      User Key  (r) = Recorded By, (t) = Taken By, (c) = Cosigned By    Initials Name Effective Dates Discipline    CW Mary Sylvester, PTA 06/22/15 -  PT    DB Carley White L, RNA 06/02/17 -  Nurse    Luz Lopez RN 08/02/16 -  Nurse    Alissa Holt RNA 06/09/17 -  Nurse    Fernando Morales RN 08/02/17 -  Nurse    Jaclyn Pineda RN 01/12/18 -  Nurse          Wound 10/27/18 1052 Left hip incision (Active)   Dressing Appearance dry;intact;no drainage 11/7/2018  8:00 PM   Closure  [place] 11/8/2018  8:04 AM   Drainage Amount none 11/8/2018  8:04 AM   Care, Wound cleansed with;soap and water 11/8/2018  8:04 AM   Dressing Care, Wound open to air 11/8/2018  8:04 AM       Wound 11/03/18 1237 Left leg incision (Active)   Dressing Appearance dry;intact;no drainage 11/8/2018  8:04 AM   Closure MONICA 11/8/2018  8:04 AM   Base dressing in place, unable to visualize 11/8/2018  8:04 AM   Dressing Care, Wound dressing changed;gauze;low-adherent 11/8/2018  8:04 AM       Wound 11/05/18 0952 Left heel blisters (Active)   Dressing Appearance open to air 11/8/2018  8:04 AM   Closure Open to air 11/8/2018  8:04 AM   Periwound redness 11/7/2018  8:00 PM   Dressing Care, Wound open to air 11/8/2018  8:04 AM       Wound 11/07/18 0439 Left lateral heel blisters (Active)   Drainage Amount none 11/7/2018  8:00 PM   Dressing Care, Wound open to air 11/8/2018  8:04 AM       Wound 11/07/18 0440 Left lateral foot blisters (Active)   Drainage Amount none 11/7/2018  8:00 PM   Dressing Care, Wound open to air 11/8/2018  8:04 AM             Physical Therapy Education     Title: PT OT SLP Therapies (Active)     Topic: Physical Therapy (Active)     Point: Mobility training (Active)    Learning Progress Summary     Learner Status Readiness Method Response Comment Documented by    Patient Active Acceptance E,D NR bed mobility, benefit of activity, plan of care  11/08/18 1115     Done Acceptance E VU Pt educated on use of bed rails to assist with rolling.  11/04/18 0817          Point: Home exercise program (Active)    Learning Progress Summary     Learner Status Readiness Method Response Comment Documented by    Patient Active Acceptance E,D NR bed mobility, benefit of activity, plan of care  11/08/18 1115     Active Acceptance E,D NR benefit of exercise[facility]  11/05/18 1134    Family Active Acceptance E,D NR benefit of exercise, plan of caare  11/05/18 1134          Point: Body mechanics (Active)    Learning  Progress Summary     Learner Status Readiness Method Response Comment Documented by    Patient Active Acceptance E,D NR bed mobility, benefit of activity, plan of care  11/08/18 1115          Point: Precautions (Active)    Learning Progress Summary     Learner Status Readiness Method Response Comment Documented by    Patient Active Acceptance E NR   11/07/18 0539    Family Done Acceptance E,TB,D ABLERTOLUPE off loading heels  11/07/18 1109                      User Key     Initials Effective Dates Name Provider Type Discipline     08/02/16 -  Caren Nation PTA Physical Therapy Assistant PT     06/22/15 -  Mary Sylvester PTA Physical Therapy Assistant PT     06/09/17 -  Alissa Armendariz RNA Registered Nurse Nurse     10/08/18 -  Andrea Roe, LAURA Student PT Student PT                    PT Recommendation and Plan     Plan of Care Reviewed With: patient  Progress: no change  Outcome Summary: Pt requires max assist of 2 for all bed mobility.  SB/CGA to maintain sitting balance EOB.  Gentle AAROM exercises with BLE as tolerated.  Will continue to benefit from therapy to improve functional mobility..          Outcome Measures     Row Name 11/08/18 1100 11/07/18 1100 11/06/18 1000       How much help from another person do you currently need...    Turning from your back to your side while in flat bed without using bedrails? 2  -CW 2  -AH 1  -KJ    Moving from lying on back to sitting on the side of a flat bed without bedrails? 2  -CW 2  -AH 1  -KJ    Moving to and from a bed to a chair (including a wheelchair)? 1  -CW 1  -AH 1  -KJ    Standing up from a chair using your arms (e.g., wheelchair, bedside chair)? 1  -CW 1  -AH 1  -KJ    Climbing 3-5 steps with a railing? 1  -CW 1  -AH 1  -KJ    To walk in hospital room? 1  -CW 1  -AH 1  -KJ    AM-PAC 6 Clicks Score 8  - 8  - 6  -KJ       Functional Assessment    Outcome Measure Options AM-PAC 6 Clicks Basic Mobility (PT)  -CW AM-PAC 6 Clicks  Basic Mobility (PT)  - AM-PAC 6 Clicks Basic Mobility (PT)  -KJ      User Key  (r) = Recorded By, (t) = Taken By, (c) = Cosigned By    Initials Name Provider Type     Caren Nation, PTA Physical Therapy Assistant    KJ Karen Zimmer, PTA Physical Therapy Assistant    CW Mary Sylvester, DELMA Physical Therapy Assistant           Time Calculation:         PT Charges     Row Name 11/08/18 1118             Time Calculation    Start Time 1024  -CW      Stop Time 1047  -CW      Time Calculation (min) 23 min  -CW      PT Received On 11/08/18  -CW      PT Goal Re-Cert Due Date 11/14/18  -CW         Time Calculation- PT    Total Timed Code Minutes- PT 23 minute(s)  -CW         Timed Charges    63754 - PT Therapeutic Activity Minutes 23  -CW        User Key  (r) = Recorded By, (t) = Taken By, (c) = Cosigned By    Initials Name Provider Type    Mary Escobar, DELMA Physical Therapy Assistant        Therapy Suggested Charges     Code   Minutes Charges    98071 (CPT®) Hc Pt Neuromusc Re Education Ea 15 Min      03558 (CPT®) Hc Pt Ther Proc Ea 15 Min      39914 (CPT®) Hc Gait Training Ea 15 Min      31637 (CPT®) Hc Pt Therapeutic Act Ea 15 Min 23 2    67857 (CPT®) Hc Pt Manual Therapy Ea 15 Min      88545 (CPT®) Hc Pt Iontophoresis Ea 15 Min      72215 (CPT®) Hc Pt Elec Stim Ea-Per 15 Min      21706 (CPT®) Hc Pt Ultrasound Ea 15 Min      41590 (CPT®) Hc Pt Self Care/Mgmt/Train Ea 15 Min      91336 (CPT®) Hc Pt Prosthetic (S) Train Initial Encounter, Each 15 Min      51842 (CPT®) Hc Pt Orthotic(S)/Prosthetic(S) Encounter, Each 15 Min      36946 (CPT®) Hc Orthotic(S) Mgmt/Train Initial Encounter, Each 15min      Total  23 2        Therapy Charges for Today     Code Description Service Date Service Provider Modifiers Qty    [id number] HC PT THERAPEUTIC ACT EA 15 MIN 11/8/2018 Mary Sylvester PTA GP 2          PT G-Codes  Outcome Measure Options: AM-PAC 6 Clicks Basic Mobility (PT)  AM-PAC 6 Clicks Score:  8  Functional Limitation: Mobility: Walking and moving around  Mobility: Walking and Moving Around Current Status (): At least 60 percent but less than 80 percent impaired, limited or restricted  Mobility: Walking and Moving Around Goal Status (): At least 40 percent but less than 60 percent impaired, limited or restricted    Mary Sylvester, PTA  11/8/2018

## 2018-11-08 NOTE — PROGRESS NOTES
Continued Stay Note  [facility]     Patient Name: Burke Reinoso Jr.  MRN: [id number]  Today's Date: 11/8/2018    Admit Date: 11/1/2018          Discharge Plan     Row Name 11/08/18 1340       Plan    Patient/Family in Agreement with Plan yes    Plan Comments [facility] PT may dc to SNF when medically ready. [phone] phone [phone] fax.    Final Discharge Disposition Code 03 - skilled nursing facility (SNF)    Row Name 11/08/18 1128       Plan    Plan Comments Continuing to await pre-cert. Spoke with staff at [facility] [phone], they have sent all requested clinicals but have not received an answer yet.               Discharge Codes    No documentation.           CASSIDY Koch

## 2018-11-08 NOTE — PLAN OF CARE
Problem: Skin Injury Risk (Adult)  Goal: Skin Health and Integrity  Outcome: Ongoing (interventions implemented as appropriate)      Problem: Patient Care Overview  Goal: Plan of Care Review  Outcome: Ongoing (interventions implemented as appropriate)   11/08/18 0452   Coping/Psychosocial   Plan of Care Reviewed With patient   Plan of Care Review   Progress no change   OTHER   Outcome Summary Oriented to self. . No pain meds requested this shift. has slept inbetween care. Prevalon boots on both feet, Mepilex dressing s to both heels. Incont. of bowel and bladder this shift. Left hip dressing CDI. Will cont. to monitor       Problem: Fall Risk (Adult)  Goal: Absence of Fall  Outcome: Ongoing (interventions implemented as appropriate)      Problem: Nutrition, Imbalanced: Inadequate Oral Intake (Adult)  Goal: Improved Oral Intake  Outcome: Ongoing (interventions implemented as appropriate)    Goal: Prevent Further Weight Loss  Outcome: Ongoing (interventions implemented as appropriate)      Problem: Hip Arthroplasty (Total, Partial) (Adult)  Goal: Signs and Symptoms of Listed Potential Problems Will be Absent, Minimized or Managed (Hip Arthroplasty)  Outcome: Ongoing (interventions implemented as appropriate)    Goal: Anesthesia/Sedation Recovery  Outcome: Outcome(s) achieved Date Met: 11/08/18

## 2018-11-08 NOTE — PROGRESS NOTES
[facility] Medicine Services  INPATIENT PROGRESS NOTE    Length of Stay: 6  Date of Admission: 11/1/2018  Primary Care Physician: Mg Colmenares MD    Subjective   Chief Complaint: follow up left hip pain  HPI   Lying in bed.  Son in law in room.  Patient correctly identifies family member.  He denies nausea, vomiting or abdominal pain.  Abdomen slightly distended.  He thought he was in [place] earlier.  He reports left hip discomfort.  He stood with physical therapy yesterday.    Review of Systems   Constitutional: Positive for activity change (After fall) and fatigue.   HENT: Negative for congestion and trouble swallowing.    Eyes: Negative for photophobia and visual disturbance.   Respiratory: Negative for cough, shortness of breath and wheezing.    Cardiovascular: Positive for leg swelling (Left hip). Negative for chest pain and palpitations.   Gastrointestinal: Positive for constipation. Negative for diarrhea, nausea and vomiting.   Endocrine: Negative for cold intolerance, heat intolerance and polyuria.   Genitourinary: Negative for dysuria and urgency.   Musculoskeletal: Positive for gait problem and myalgias.   Skin: Positive for wound (Left hip).   Allergic/Immunologic: Negative for immunocompromised state.   Neurological: Positive for weakness.   Hematological: Negative for adenopathy. Does not bruise/bleed easily.   Psychiatric/Behavioral: Positive for confusion.      All pertinent negatives and positives are as above. All other systems have been reviewed and are negative unless otherwise stated.     Objective    Temp:  [97.6 °F (36.4 °C)-98.7 °F (37.1 °C)] 98.7 °F (37.1 °C)  Heart Rate:  [79-97] 97  Resp:  [14-20] 16  BP: (105-151)/(55-91) 130/55  Physical Exam   Constitutional: He appears well-developed and well-nourished.   HENT:   Head: Normocephalic and atraumatic.   Hard of hearing   Eyes: Pupils are equal, round, and reactive to light. EOM are normal.    Eyeglasses in place   Neck: Normal range of motion. Neck supple.   Cardiovascular: Normal rate, regular rhythm, normal heart sounds and intact distal pulses.  Exam reveals no gallop and no friction rub.    No murmur heard.  Pulmonary/Chest: Effort normal and breath sounds normal. No respiratory distress. He has no wheezes. He has no rales.   No oxygen in use   Abdominal: Soft. Bowel sounds are normal. He exhibits distension (Slight abdominal distention). There is no tenderness.   Genitourinary:   Genitourinary Comments: Incontinent.  Diaper in place   Musculoskeletal: He exhibits edema (Left lower extremity) and tenderness (Left hip).   SCDs bilateral lower extremities   Neurological:   Identifies self and family members.   Skin: Skin is warm and dry.   Staples intact left hip.  Some redness at staple line.   Psychiatric: He has a normal mood and affect. His behavior is normal. Judgment and thought content normal.     Results Review:  I have reviewed the labs, radiology results, and diagnostic studies.    Results from last 7 days  Lab Units 11/08/18  0434 11/07/18 0823 11/06/18 0437 11/05/18  0710   WBC 10*3/mm3  --  12.06* 11.45* 13.44*   HEMOGLOBIN g/dL 8.9* 9.0* 8.7* 8.8*   HEMATOCRIT % 27.3* 28.1* 27.3* 26.5*   PLATELETS 10*3/mm3  --  434* 312 344          Results from last 7 days  Lab Units 11/07/18  0823 11/06/18 0437 11/05/18  0710  11/01/18  2337   SODIUM mmol/L 138 138 137  < > 135   POTASSIUM mmol/L 4.7 3.5 3.2*  < > 4.6   CHLORIDE mmol/L 96* 97* 94*  < > 94*   CO2 mmol/L 31.0 33.0* 33.0*  < > 31.0   BUN mg/dL 23* 28* 26*  < > 26*   CREATININE mg/dL 0.75 0.85 0.86  < > 0.87   CALCIUM mg/dL 8.9 8.5 8.3*  < > 8.6   BILIRUBIN mg/dL  --   --   --   --  1.2*   ALK PHOS U/L  --   --   --   --  80   ALT (SGPT) U/L  --   --   --   --  37   AST (SGOT) U/L  --   --   --   --  47*   GLUCOSE mg/dL 98 105* 102*  < > 129*   < > = values in this interval not displayed.     Imaging Results (all)     Procedure  Component Value Units Date/Time    XR Femur 2 View Left [[id number]] Collected:  11/03/18 1254     Updated:  11/04/18 0939    Narrative:       EXAMINATION:   XR FEMUR 2 VW LEFT-  11/3/2018 12:54 PM CDT     HISTORY: Fractured left femur     9 images are obtained in the operating room.     Orthopedic pins present in the left femoral head and neck. Femoral nail  is present. Fracture fragments are relatively aligned. Cerclage wires  are present.     3 minutes 12 seconds of fluoroscopic time was used during this procedure  This report was finalized on 11/03/2018 12:56 by Dr. Donis Andrews MD.    FL C Arm During Surgery [[id number]] Collected:  11/03/18 1254     Updated:  11/04/18 0939    Narrative:       EXAMINATION:   XR FEMUR 2 VW LEFT-  11/3/2018 12:54 PM CDT     HISTORY: Fractured left femur     9 images are obtained in the operating room.     Orthopedic pins present in the left femoral head and neck. Femoral nail  is present. Fracture fragments are relatively aligned. Cerclage wires  are present.     3 minutes 12 seconds of fluoroscopic time was used during this procedure  This report was finalized on 11/03/2018 12:56 by Dr. Donis Andrews MD.    XR Abdomen KUB [[id number]] Collected:  11/02/18 0815     Updated:  11/02/18 0822    Narrative:       EXAMINATION: KUB 11/20/2018     HISTORY: Belly pain and leukocytosis     FINDINGS: KUB radiograph demonstrates mild constipation with a  nonspecific bowel gas pattern. There is no obstruction or free air.  There is arthritic change of the right hip and previous fixation for a  left hip fracture. A Singleton catheter is in place.       Impression:       . Mild constipation. There is a nonspecific bowel gas  pattern. There is no obstruction or free air.  This report was finalized on 11/02/2018 08:19 by Dr. Harvey Landry MD.    XR Femur 2 View Left [[id number]] Collected:  11/02/18 0710     Updated:  11/02/18 0718    Narrative:       LEFT FEMUR, 2 VIEWS 11/2/2018 12:12 AM CDT      HISTORY: fall, pain; S72.352A-Displaced comminuted fracture of shaft of  left femur, initial encounter for closed fracture     COMPARISON: Intraoperative fluoroscopic images dated 10/27/2018     FINDINGS:     Frontal and lateral radiographs of the left femur were obtained.     TFN recently placed. New periprosthetic fracture with comminution of the  proximal diaphysis. Large displaced butterfly fragments are present and  there is foreshortening up to 7 cm. Distal femur appears intact.       Impression:       1. New comminuted periprosthetic fracture along the proximal diaphysis  of femur.  This report was finalized on 11/02/2018 07:15 by Dr Babak Franklin, .    XR Chest 1 View [[id number]] Collected:  11/02/18 0657     Updated:  11/02/18 0701    Narrative:       EXAMINATION:   XR CHEST 1 VW-  11/2/2018 6:57 AM CDT     HISTORY: Preop     Frontal upright radiograph of the chest 11/2/2018 1:31 AM CDT     COMPARISON: October 27, 2018.     FINDINGS:   The lungs are clear. The cardiac silhouette is normal. Vascular  calcifications present aortic arch..      The osseous structures and surrounding soft tissues demonstrate no acute  abnormality.       Impression:       1. No radiographic evidence of acute cardiopulmonary process.        This report was finalized on 11/02/2018 06:57 by Dr. Donis Andrews MD.        Intake/Output    Intake/Output Summary (Last 24 hours) at 11/08/18 0948  Last data filed at 11/08/18 0900   Gross per 24 hour   Intake              360 ml   Output                0 ml   Net              360 ml       Scheduled Meds    apixaban 2.5 mg Oral Q12H   bisacodyl 10 mg Rectal Daily   carbamide peroxide 10 drop Both Ears Nightly   famotidine 20 mg Oral Daily   hydrochlorothiazide 25 mg Oral Daily   magnesium hydroxide 10 mL Oral Once   polyethylene glycol 17 g Oral Daily   potassium chloride 40 mEq Oral Daily   QUEtiapine 50 mg Oral Nightly   sodium chloride 3 mL Intravenous Q12H   sodium chloride 3 mL  Intravenous Q12H       I have reviewed the patient current medications.     Assessment/Plan     Assessment:  1.  Acute traumatic comminuted periprosthetic fracture along the proximal diaphysis of the femur with removal of pre-existing trochanteric fixation nail  and placement of long trochanteric fixation nail left femur and cable of femoral shaft fracture 11/3/18  2.  Frequent falls  3.  Recent left intertrochanteric hip fracture with trochanteric fixation nailing on 10/27/18  4.  Acute postop blood loss anemia   5.  Acutely anticoagulated with Eliquis for deep vein thrombosis prophylaxis secondary to #3  6.  Constipation, chronic  7.  Mild protein malnutrition   8.  Essential hypertension   9.  Leukocytosis, suspected reactive  10.  Hypokalemia      Plan:  1.  Removal of pre-existing trochanteric fixation nail and placement of long trochanteric fixation nail left femur and cabling of femoral shaft fracture 11/3/18  2.   Physical therapy occupational therapy slowly per orthopedics. Stood at bedside with physical therapy yesterday.  3.  Transfused 4 units PRBC and 4 units FFP total since admission   4.  Milk of magnesia today.  5.  SCDs and Eliquis for deep vein thrombosis prophylaxis  6.  H/H 8.9/27.3 today  7.  Social service consult for discharge planning.  [facility] and rehabilitation awaiting insurance approval and decision.  Dr. Colon indicates he would like for patient to stay through the weekend as there is some redness at incision line.  8.  Potassium 4.7.  Received replacement 11/7    His daughter, Holly is his surrogate decision maker  The above documentation resulted from a face-to-face encounter by me Alejandra RUCKER, [facility].      Discharge Planning: I expect patient to be discharged to SNF when insurance approves and bed offers.  Dr. Colon wants to monitor over the weekend.    ALKA Abdul   11/08/18   9:48 AM    I personally evaluated and examined the patient in conjunction  with ALKA Segal and agree with the assessment, treatment plan, and disposition of the patient as recorded by her. My history, exam, and further recommendations are:     Discussed with his nurse, Sander.     Hemoglobin is stable.     Still awaiting pre-cert for rehab placement. Dr. Colon was said to want to monitor him over the weekend, but his note from this morning did not make mention of that. I will discuss with him.     Mingo Ramon, DO  11/08/18  1:01 PM

## 2018-11-08 NOTE — PROGRESS NOTES
Continued Stay Note  [facility]     Patient Name: Burke Reinoso Jr.  MRN: [id number]  Today's Date: 11/8/2018    Admit Date: 11/1/2018          Discharge Plan     Row Name 11/08/18 1128       Plan    Plan Comments Continuing to await pre-cert. Spoke with staff at [facility] and [facility] [phone], they have sent all requested clinicals but have not received an answer yet.               Discharge Codes    No documentation.           CASSIDY Koch

## 2018-11-09 PROCEDURE — 97530 THERAPEUTIC ACTIVITIES: CPT

## 2018-11-09 PROCEDURE — 97110 THERAPEUTIC EXERCISES: CPT

## 2018-11-09 RX ADMIN — APIXABAN 2.5 MG: 2.5 TABLET, FILM COATED ORAL at 21:03

## 2018-11-09 RX ADMIN — POLYETHYLENE GLYCOL 3350 17 G: 17 POWDER, FOR SOLUTION ORAL at 09:18

## 2018-11-09 RX ADMIN — POTASSIUM CHLORIDE 40 MEQ: 750 CAPSULE, EXTENDED RELEASE ORAL at 09:18

## 2018-11-09 RX ADMIN — CARBAMIDE PEROXIDE 6.5% 10 DROP: 6.5 LIQUID AURICULAR (OTIC) at 21:04

## 2018-11-09 RX ADMIN — HYDROCHLOROTHIAZIDE 25 MG: 25 TABLET ORAL at 09:18

## 2018-11-09 RX ADMIN — FAMOTIDINE 20 MG: 20 TABLET, FILM COATED ORAL at 09:18

## 2018-11-09 RX ADMIN — APIXABAN 2.5 MG: 2.5 TABLET, FILM COATED ORAL at 09:18

## 2018-11-09 RX ADMIN — QUETIAPINE FUMARATE 50 MG: 25 TABLET, FILM COATED ORAL at 21:01

## 2018-11-09 NOTE — PLAN OF CARE
Problem: Patient Care Overview  Goal: Plan of Care Review  Outcome: Ongoing (interventions implemented as appropriate)   11/09/18 3068   Coping/Psychosocial   Plan of Care Reviewed With patient;family   Plan of Care Review   Progress no change   OTHER   Outcome Summary Pts famlily report pt has been very tired over the last couple days and has not been eating as well. States pt is not drinking the milkshakes however they would still like them to be sent. Family would also like to try Magic cup, ordered daily with dinner. Will continue to follow.

## 2018-11-09 NOTE — PLAN OF CARE
Problem: Patient Care Overview  Goal: Plan of Care Review  Outcome: Ongoing (interventions implemented as appropriate)   11/09/18 0111   Coping/Psychosocial   Plan of Care Reviewed With patient   Plan of Care Review   Progress no change   OTHER   Outcome Summary Pt completed supine gentle BLE A/AArom exercises. Will continue to benefit from therapy to increase strength and improve functional mobility.

## 2018-11-09 NOTE — PLAN OF CARE
Problem: Skin Injury Risk (Adult)  Goal: Skin Health and Integrity  Outcome: Ongoing (interventions implemented as appropriate)      Problem: Patient Care Overview  Goal: Plan of Care Review  Outcome: Ongoing (interventions implemented as appropriate)   11/09/18 0244   Coping/Psychosocial   Plan of Care Reviewed With patient   Plan of Care Review   Progress no change   OTHER   Outcome Summary Patient confused, denies pain, screams when turned. Free from falls. Inc in tact. cont to monitor.        Problem: Fall Risk (Adult)  Goal: Absence of Fall  Outcome: Ongoing (interventions implemented as appropriate)      Problem: Nutrition, Imbalanced: Inadequate Oral Intake (Adult)  Goal: Improved Oral Intake  Outcome: Ongoing (interventions implemented as appropriate)      Problem: Hip Arthroplasty (Total, Partial) (Adult)  Goal: Signs and Symptoms of Listed Potential Problems Will be Absent, Minimized or Managed (Hip Arthroplasty)  Outcome: Ongoing (interventions implemented as appropriate)

## 2018-11-09 NOTE — THERAPY TREATMENT NOTE
Acute Care - Physical Therapy Treatment Note  [facility]     Patient Name: Burke Reinoso Jr.  : 1930  MRN: [id number]  Today's Date: 2018  Onset of Illness/Injury or Date of Surgery: 18  Date of Referral to PT: 18  Referring Physician: Dr. Colon    Admit Date: 2018    Visit Dx:    ICD-10-CM ICD-9-CM   1. Closed displaced comminuted fracture of shaft of left femur, initial encounter (CMS/[facility]) S72.352A 821.01   2. Dysphagia, unspecified type R13.10 787.20   3. Impaired mobility Z74.09 799.89     Patient Active Problem List   Diagnosis   • Closed displaced intertrochanteric fracture of left femur (CMS/[facility])   • Closed displaced comminuted fracture of shaft of left femur (CMS/[facility])       Therapy Treatment          Rehabilitation Treatment Summary     Row Name 1816             Treatment Time/Intention    Discipline physical therapy assistant  -CW      Document Type therapy note (daily note)  -CW2      Subjective Information complains of;pain  -CW2      Mode of Treatment physical therapy  -CW2      Existing Precautions/Restrictions fall;weight bearing   TTWB LLE, progress slowly per Dr Colon  -HU2      Treatment Considerations/Comments TTWB LLE; progress slowly per Dr Colon  -CW2      Recorded by [CW] Mary Sylvester, PTA 18  [CW2] Mary Sylvester, PTA 18      Row Name 18 0816             Mobility Assessment/Intervention    Extremity Weight-bearing Status left lower extremity  -CW      Left Lower Extremity (Weight-bearing Status) toe touch weight-bearing (TTWB)  -CW      Recorded by [CW] Mary Sylvester, PTA 18      Row Name 18 0816             Lower Extremity Supine Therapeutic Exercise    Performed, Supine Lower Extremity (Therapeutic Exercise) hip abduction/adduction;SAQ (short arc quad) over bolster;heel slides;ankle pumps  -CW      Exercise Type, Supine Lower Extremity (Therapeutic Exercise) AAROM (active assistive  range of motion);AROM (active range of motion)  -CW      Sets/Reps Detail, Supine Lower Extremity (Therapeutic Exercise) 20  -CW      Comment, Supine Lower Extremity (Therapeutic Exercise) BLE  -CW      Recorded by [CW] Mary Sylvester, PTA 11/09/18 0915      Row Name 11/09/18 0816             Positioning and Restraints    Pre-Treatment Position in bed  -CW      Post Treatment Position bed  -CW      In Bed fowlers;call light within reach;encouraged to call for assist;exit alarm on;with family/caregiver;SCD pump applied;heels elevated  -CW      Recorded by [CW] Mary Sylvester, PTA 11/09/18 0915      Row Name 11/09/18 0816             Pain Scale: Numbers Pre/Post-Treatment    Pain Location - Side Left  -CW      Pain Location - Orientation lower  -CW      Pain Location extremity  -CW      Recorded by [CW] Mary Sylvester, PTA 11/09/18 0915      Row Name 11/09/18 0816             Pain Scale: FACES Pre/Post-Treatment    Pain: FACES Scale, Pretreatment 0-->no hurt  -CW      Pain: FACES Scale, Post-Treatment 4-->hurts little more   during movement  -CW      Recorded by [CW] Mary Sylvester, PTA 11/09/18 0915      Row Name                Wound 10/27/18 1052 Left hip incision    Wound - Properties Group Date first assessed: 10/27/18 [AD] Time first assessed: 1052 [AD] Side: Left [AD] Location: hip [AD] Type: incision [AD] Recorded by:  [AD] Fernando Rajput RN 10/27/18 1052    Row Name                Wound 11/03/18 1237 Left leg incision    Wound - Properties Group Date first assessed: 11/03/18 [LC] Time first assessed: 1237 [LC] Side: Left [LC] Location: leg [LC] Type: incision [LC] Recorded by:  [LC] Luz Medina RN 11/03/18 1237    Row Name                Wound 11/05/18 0952 Left heel blisters    Wound - Properties Group Date first assessed: 11/05/18 [AW] Time first assessed: 0952 [AW] Present On Admission : picture taken;no [AW] Side: Left [AW] Location: heel [AW] Type: blisters [AW] Recorded by:   [AW] Jaclyn Martinez RN 11/05/18 1520    Row Name                Wound 11/07/18 0439 Left lateral heel blisters    Wound - Properties Group Date first assessed: 11/07/18 [EL] Time first assessed: 0439 [EL] Present On Admission : no;picture taken [EL] Side: Left [EL] Orientation: lateral [EL] Location: heel [EL] Type: blisters [EL] Recorded by:  [EL] Alissa Armendariz, RNA 11/07/18 0440    Row Name                Wound 11/07/18 0440 Left lateral foot blisters    Wound - Properties Group Date first assessed: 11/07/18 [EL] Time first assessed: 0440 [EL] Present On Admission : no;picture taken [EL] Side: Left [EL] Orientation: lateral [EL] Location: foot [EL] Type: blisters [EL] Recorded by:  [EL] Alissa Armendariz, RNA 11/07/18 0440    Row Name                Wound 11/07/18 0700 Right heel blisters    Wound - Properties Group Date first assessed: 11/07/18 [DB] Time first assessed: 0700 [DB] Present On Admission : no;picture taken [DB] Side: Right [DB] Location: heel [DB] Type: blisters [DB2] Recorded by:  [DB] Carley White, RNA 11/07/18 1840 [DB2] Carley White, RNA 11/07/18 1841      User Key  (r) = Recorded By, (t) = Taken By, (c) = Cosigned By    Initials Name Effective Dates Discipline    CW Mary Sylvester, PTA 06/22/15 -  PT    DB Carley White, RNA 06/02/17 -  Nurse    Luz Lopez RN 08/02/16 -  Nurse    Alissa Holt, RNA 06/09/17 -  Nurse    Fernando Morales RN 08/02/17 -  Nurse    Jaclyn Pineda RN 01/12/18 -  Nurse          Wound 10/27/18 1052 Left hip incision (Active)   Dressing Appearance dry;intact;no drainage 11/8/2018  8:00 PM   Closure Staples 11/8/2018  8:00 PM       Wound 11/03/18 1237 Left leg incision (Active)   Dressing Appearance dry;intact;no drainage 11/8/2018  8:00 PM   Base dressing in place, unable to visualize 11/8/2018  8:00 PM       Wound 11/05/18 0952 Left heel blisters (Active)   Dressing Appearance open to air 11/8/2018   8:00 PM   Dressing Care, Wound open to air 11/8/2018  8:00 PM       Wound 11/07/18 0439 Left lateral heel blisters (Active)   Dressing Care, Wound open to air 11/8/2018  8:00 PM       Wound 11/07/18 0440 Left lateral foot blisters (Active)   Dressing Care, Wound open to air 11/8/2018  8:00 PM             Physical Therapy Education     Title: PT OT SLP Therapies (Active)     Topic: Physical Therapy (Active)     Point: Mobility training (Active)    Learning Progress Summary     Learner Status Readiness Method Response Comment Documented by    Patient Active Acceptance E,D NR bed mobility, benefit of activity, plan of care CW 11/08/18 1115     Done Acceptance E VU Pt educated on use of bed rails to assist with rolling. CB 11/04/18 0817          Point: Home exercise program (Active)    Learning Progress Summary     Learner Status Readiness Method Response Comment Documented by    Patient Active Acceptance E,D NR HEP, benefits of activity, plan of care CW 11/09/18 0915     Active Acceptance E,D NR bed mobility, benefit of activity, plan of care CW 11/08/18 1115     Active Acceptance E,D NR benefit of exercise, plan of caare  11/05/18 1134    Family Active Acceptance E,D NR HEP, benefits of activity, plan of care CW 11/09/18 0915     Active Acceptance E,D NR benefit of exercise, plan of caare CW 11/05/18 1134          Point: Body mechanics (Active)    Learning Progress Summary     Learner Status Readiness Method Response Comment Documented by    Patient Active Acceptance E,D NR HEP, benefits of activity, plan of care CW 11/09/18 0915     Active Acceptance E,D NR bed mobility, benefit of activity, plan of care CW 11/08/18 1115    Family Active Acceptance E,D NR HEP, benefits of activity, plan of care CW 11/09/18 0915          Point: Precautions (Active)    Learning Progress Summary     Learner Status Readiness Method Response Comment Documented by    Patient Active Acceptance E NR  EL 11/07/18 0539    Family Done  Acceptance E,TB,D VU,DU off loading heels  11/07/18 1109                      User Key     Initials Effective Dates Name Provider Type Discipline     08/02/16 -  Caren Nation PTA Physical Therapy Assistant PT     06/22/15 -  Mary Sylvester PTA Physical Therapy Assistant PT     06/09/17 -  Alissa Armendariz RNA Registered Nurse Nurse     10/08/18 -  Andrea Roe PT Student PT Student PT                    PT Recommendation and Plan     Plan of Care Reviewed With: patient  Progress: no change  Outcome Summary: Pt completed supine gentle BLE A/AArom exercises.  Will continue to benefit from therapy to increase strength and improve functional mmobility.          Outcome Measures     Row Name 11/09/18 0900 11/08/18 1100 11/07/18 1100       How much help from another person do you currently need...    Turning from your back to your side while in flat bed without using bedrails? 2  -CW 2  -CW 2  -AH    Moving from lying on back to sitting on the side of a flat bed without bedrails? 2  -CW 2  -CW 2  -AH    Moving to and from a bed to a chair (including a wheelchair)? 1  -CW 1  -CW 1  -AH    Standing up from a chair using your arms (e.g., wheelchair, bedside chair)? 1  -CW 1  -CW 1  -AH    Climbing 3-5 steps with a railing? 1  -CW 1  -CW 1  -AH    To walk in hospital room? 1  -CW 1  -CW 1  -AH    AM-PAC 6 Clicks Score 8  -CW 8  -CW 8  -AH       Functional Assessment    Outcome Measure Options AM-PAC 6 Clicks Basic Mobility (PT)  -CW AM-PAC 6 Clicks Basic Mobility (PT)  -CW AM-PAC 6 Clicks Basic Mobility (PT)  -AH    Row Name 11/06/18 1000             How much help from another person do you currently need...    Turning from your back to your side while in flat bed without using bedrails? 1  -KJ      Moving from lying on back to sitting on the side of a flat bed without bedrails? 1  -KJ      Moving to and from a bed to a chair (including a wheelchair)? 1  -KJ      Standing up from a chair using  your arms (e.g., wheelchair, bedside chair)? 1  -KJ      Climbing 3-5 steps with a railing? 1  -KJ      To walk in hospital room? 1  -KJ      AM-PAC 6 Clicks Score 6  -KJ         Functional Assessment    Outcome Measure Options AM-PAC 6 Clicks Basic Mobility (PT)  -KJ        User Key  (r) = Recorded By, (t) = Taken By, (c) = Cosigned By    Initials Name Provider Type     Caren Nation, PTA Physical Therapy Assistant    Karen Brooks, PTA Physical Therapy Assistant    Mary Escobar, DELMA Physical Therapy Assistant           Time Calculation:         PT Charges     Row Name 11/09/18 0917             Time Calculation    Start Time 0816  -CW      Stop Time 0840  -CW      Time Calculation (min) 24 min  -CW      PT Received On 11/09/18  -CW      PT Goal Re-Cert Due Date 11/14/18  -CW         Time Calculation- PT    Total Timed Code Minutes- PT 24 minute(s)  -CW         Timed Charges    23357 - PT Therapeutic Exercise Minutes 24  -CW        User Key  (r) = Recorded By, (t) = Taken By, (c) = Cosigned By    Initials Name Provider Type    Mary Escobar, DELMA Physical Therapy Assistant        Therapy Suggested Charges     Code   Minutes Charges    49867 (CPT®) Hc Pt Neuromusc Re Education Ea 15 Min      69327 (CPT®) Hc Pt Ther Proc Ea 15 Min 24 2    66275 (CPT®) Hc Gait Training Ea 15 Min      03695 (CPT®) Hc Pt Therapeutic Act Ea 15 Min      56736 (CPT®) Hc Pt Manual Therapy Ea 15 Min      68256 (CPT®) Hc Pt Iontophoresis Ea 15 Min      61264 (CPT®) Hc Pt Elec Stim Ea-Per 15 Min      38245 (CPT®) Hc Pt Ultrasound Ea 15 Min      25425 (CPT®) Hc Pt Self Care/Mgmt/Train Ea 15 Min      81634 (CPT®) Hc Pt Prosthetic (S) Train Initial Encounter, Each 15 Min      23830 (CPT®) Hc Pt Orthotic(S)/Prosthetic(S) Encounter, Each 15 Min      65076 (CPT®) Hc Orthotic(S) Mgmt/Train Initial Encounter, Each 15min      Total  24 2        Therapy Charges for Today     Code Description Service Date Service Provider  Modifiers Qty    87966862368 HC PT THERAPEUTIC ACT EA 15 MIN 11/8/2018 Mary Sylvester, DELMA GP 2    09840623776 HC PT THER PROC EA 15 MIN 11/9/2018 Mray Sylvester PTA GP 2          PT G-Codes  Outcome Measure Options: AM-PAC 6 Clicks Basic Mobility (PT)  AM-PAC 6 Clicks Score: 8  Functional Limitation: Mobility: Walking and moving around  Mobility: Walking and Moving Around Current Status (): At least 60 percent but less than 80 percent impaired, limited or restricted  Mobility: Walking and Moving Around Goal Status (): At least 40 percent but less than 60 percent impaired, limited or restricted    Mary Sylvester PTA  11/9/2018

## 2018-11-09 NOTE — THERAPY TREATMENT NOTE
Acute Care - Physical Therapy Treatment Note  [facility]     Patient Name: Burke Reinoso Jr.  : 1930  MRN: [id number]  Today's Date: 2018  Onset of Illness/Injury or Date of Surgery: 18  Date of Referral to PT: 18  Referring Physician: Dr. Colon    Admit Date: 2018    Visit Dx:    ICD-10-CM ICD-9-CM   1. Closed displaced comminuted fracture of shaft of left femur, initial encounter (CMS/[facility]) S72.352A 821.01   2. Dysphagia, unspecified type R13.10 787.20   3. Impaired mobility Z74.09 799.89     Patient Active Problem List   Diagnosis   • Closed displaced intertrochanteric fracture of left femur (CMS/[facility])   • Closed displaced comminuted fracture of shaft of left femur (CMS/[facility])       Therapy Treatment          Rehabilitation Treatment Summary     Row Name 18 1336 18 0816          Treatment Time/Intention    Discipline physical therapy assistant  -CW physical therapy assistant  -CW     Document Type therapy note (daily note)  -CW2 therapy note (daily note)  -CW2     Subjective Information complains of;pain   with movement  -CW2 complains of;pain  -CW2     Mode of Treatment physical therapy  -CW2 physical therapy  -CW2     Existing Precautions/Restrictions fall;weight bearing   TTWB LLE; progress slowly per Dr Isaiah SANDERS fall;weight bearing   TTWB LLE, progress slowly per Dr Isaiah SANDERS     Treatment Considerations/Comments TTWB LLE; progress slowly per Dr Isaiah SANDERS TTWB LLE; progress slowly per Dr Isaiah SANDERS     Recorded by [CW] Mary Sylvester, PTA 18 1351  [CW2] Mary Sylvester, PTA 18 1404 [CW] Mary Sylvester, PTA 18 0816  [CW2] Mary Sylvester, PTA 18 0915     Row Name 18 0816             Mobility Assessment/Intervention    Extremity Weight-bearing Status left lower extremity  -CW      Left Lower Extremity (Weight-bearing Status) toe touch weight-bearing (TTWB)  -CW      Recorded by [CW] Mary Sylvester, PTA  11/09/18 0915      Row Name 11/09/18 1336             Bed Mobility Assessment/Treatment    Supine-Sit [place] (Bed Mobility) maximum assist (25% patient effort);2 person assist;verbal cues  -CW      Sit-Supine [place] (Bed Mobility) maximum assist (25% patient effort);2 person assist;verbal cues  -CW      Bed Mobility, Safety Issues decreased use of legs for bridging/pushing  -CW      Assistive Device (Bed Mobility) draw sheet  -CW      Comment (Bed Mobility) SBA/CGA to maintain balance sitting EOB  -CW      Recorded by [CW] Mary Sylvester, PTA 11/09/18 1404      Row Name 11/09/18 0816             Lower Extremity Supine Therapeutic Exercise    Performed, Supine Lower Extremity (Therapeutic Exercise) hip abduction/adduction;SAQ (short arc quad) over bolster;heel slides;ankle pumps  -CW      Exercise Type, Supine Lower Extremity (Therapeutic Exercise) AAROM (active assistive range of motion);AROM (active range of motion)  -CW      Sets/Reps Detail, Supine Lower Extremity (Therapeutic Exercise) 20  -CW      Comment, Supine Lower Extremity (Therapeutic Exercise) BLE  -CW      Recorded by [CW] Mary Sylvester, PTA 11/09/18 0915      Row Name 11/09/18 1336             Therapeutic Exercise    Exercise Type (Therapeutic Exercise) AAROM (active assistive range of motion);AROM (active range of motion)  -CW      Position (Therapeutic Exercise) seated  -CW      Sets/Reps (Therapeutic Exercise) 10  -CW      Recorded by [CW] Mary Sylvester, PTA 11/09/18 1404      Row Name 11/09/18 1336 11/09/18 0816          Positioning and Restraints    Pre-Treatment Position in bed  -CW in bed  -CW     Post Treatment Position bed  -CW bed  -CW     In Bed supine;call light within reach;encouraged to call for assist;exit alarm on;with family/caregiver  -CW fowlers;call light within reach;encouraged to call for assist;exit alarm on;with family/caregiver;SCD pump applied;heels elevated  -CW     Recorded by [CW] Higinio  Mary GROVE, PTA 11/09/18 1404 [CW] Mary Sylvester, PTA 11/09/18 0915     Row Name 11/09/18 1336 11/09/18 0816          Pain Scale: Numbers Pre/Post-Treatment    Pain Location - Side Left  -CW Left  -CW     Pain Location - Orientation lower  -CW lower  -CW     Pain Location extremity  -CW extremity  -CW     Pain Intervention(s) Repositioned  -CW  --     Recorded by [CW] Mary Sylvester, PTA 11/09/18 1404 [CW] Mary Sylvester, PTA 11/09/18 0915     Row Name 11/09/18 1336 11/09/18 0816          Pain Scale: FACES Pre/Post-Treatment    Pain: FACES Scale, Pretreatment 0-->no hurt  -CW 0-->no hurt  -CW     Pain: FACES Scale, Post-Treatment 4-->hurts little more   during ROM  -CW 4-->hurts little more   during movement  -CW     Recorded by [CW] Mary Sylvester, PTA 11/09/18 1404 [CW] Mary Sylvester, PTA 11/09/18 0915     Row Name                Wound 10/27/18 1052 Left hip incision    Wound - Properties Group Date first assessed: 10/27/18 [AD] Time first assessed: 1052 [AD] Side: Left [AD] Location: hip [AD] Type: incision [AD] Recorded by:  [AD] Fernando Rajput RN 10/27/18 1052    Row Name                Wound 11/03/18 1237 Left leg incision    Wound - Properties Group Date first assessed: 11/03/18 [LC] Time first assessed: 1237 [LC] Side: Left [LC] Location: leg [LC] Type: incision [LC] Recorded by:  [LC] Luz Medina RN 11/03/18 1237    Row Name                Wound 11/05/18 0952 Left heel blisters    Wound - Properties Group Date first assessed: 11/05/18 [AW] Time first assessed: 0952 [AW] Present On Admission : picture taken;no [AW] Side: Left [AW] Location: heel [AW] Type: blisters [AW] Recorded by:  [AW] Jaclyn Martinez RN 11/05/18 1520    Row Name                Wound 11/07/18 0439 Left lateral heel blisters    Wound - Properties Group Date first assessed: 11/07/18 [EL] Time first assessed: 0439 [EL] Present On Admission : no;picture taken [EL] Side: Left [EL] Orientation: lateral  [EL] Location: heel [EL] Type: blisters [EL] Recorded by:  [EL] Alissa Armendariz, RNA 11/07/18 0440    Row Name                Wound 11/07/18 0440 Left lateral foot blisters    Wound - Properties Group Date first assessed: 11/07/18 [EL] Time first assessed: 0440 [EL] Present On Admission : no;picture taken [EL] Side: Left [EL] Orientation: lateral [EL] Location: foot [EL] Type: blisters [EL] Recorded by:  [EL] Alissa Armendariz, RNA 11/07/18 0440    Row Name                Wound 11/07/18 0700 Right heel blisters    Wound - Properties Group Date first assessed: 11/07/18 [DB] Time first assessed: 0700 [DB] Present On Admission : no;picture taken [DB] Side: Right [DB] Location: heel [DB] Type: blisters [DB2] Recorded by:  [DB] Carley White, RNA 11/07/18 1840 [DB2] Carley White, RNA 11/07/18 1841      User Key  (r) = Recorded By, (t) = Taken By, (c) = Cosigned By    Initials Name Effective Dates Discipline    CW Mary Sylvester, PTA 06/22/15 -  PT    DB Carley White, RNA 06/02/17 -  Nurse    Luz Lopez RN 08/02/16 -  Nurse    Alissa Holt, RNA 06/09/17 -  Nurse    Fernando Morales RN 08/02/17 -  Nurse    Jaclyn Pineda RN 01/12/18 -  Nurse          Wound 10/27/18 1052 Left hip incision (Active)   Dressing Appearance intact;no drainage;dry 11/9/2018  9:10 AM   Closure [place] 11/9/2018  9:10 AM   Dressing Care, Wound open to air 11/9/2018  9:10 AM       Wound 11/03/18 1237 Left leg incision (Active)   Dressing Appearance dry;intact;no drainage 11/9/2018  9:10 AM   Closure [place] 11/9/2018  9:10 AM   Base dressing in place, unable to visualize 11/8/2018  8:00 PM   Drainage Amount none 11/9/2018  9:10 AM   Dressing Care, Wound gauze;low-adherent 11/9/2018  9:10 AM       Wound 11/05/18 0952 Left heel blisters (Active)   Dressing Appearance open to air 11/9/2018  9:10 AM   Dressing Care, Wound open to air 11/9/2018  9:10 AM       Wound 11/07/18 0439 Left lateral  heel blisters (Active)   Dressing Appearance open to air 11/9/2018  9:10 AM   Drainage Amount none 11/9/2018  9:10 AM   Dressing Care, Wound open to air 11/9/2018  9:10 AM       Wound 11/07/18 0440 Left lateral foot blisters (Active)   Drainage Amount none 11/9/2018  9:10 AM   Dressing Care, Wound open to air 11/9/2018  9:10 AM             Physical Therapy Education     Title: PT OT SLP Therapies (Active)     Topic: Physical Therapy (Active)     Point: Mobility training (Active)    Learning Progress Summary     Learner Status Readiness Method Response Comment Documented by    Patient Active Acceptance E,D NR bed mobility, benefit of activity, plan of care CW 11/08/18 1115     Done Acceptance E VU Pt educated on use of bed rails to assist with rolling. CB 11/04/18 0817          Point: Home exercise program (Active)    Learning Progress Summary     Learner Status Readiness Method Response Comment Documented by    Patient Active Acceptance E,D NR HEP, benefits of activity, plan of care CW 11/09/18 0915     Active Acceptance E,D NR bed mobility, benefit of activity, plan of care CW 11/08/18 1115     Active Acceptance E,D NR benefit of exercise, plan of caare  11/05/18 1134    Family Active Acceptance E,D NR HEP, benefits of activity, plan of care CW 11/09/18 0915     Active Acceptance E,D NR benefit of exercise, plan of caare  11/05/18 1134          Point: Body mechanics (Active)    Learning Progress Summary     Learner Status Readiness Method Response Comment Documented by    Patient Active Acceptance E,D NR HEP, benefits of activity, plan of care CW 11/09/18 0915     Active Acceptance E,D NR bed mobility, benefit of activity, plan of care CW 11/08/18 1115    Family Active Acceptance E,D NR HEP, benefits of activity, plan of care CW 11/09/18 0915          Point: Precautions (Active)    Learning Progress Summary     Learner Status Readiness Method Response Comment Documented by    Patient Active Acceptance E NR  EL  11/07/18 0539    Family Done Acceptance E,TB,D VU,DU off loading heels  11/07/18 1109                      User Key     Initials Effective Dates Name Provider Type [facility] 08/02/16 -  Caren Nation, DELMA Physical Therapy Assistant PT     06/22/15 -  Mary Sylvester PTA Physical Therapy Assistant PT     06/09/17 -  Alissa Armendariz RNA Registered Nurse Nurse     10/08/18 -  Andrea Roe PT Student PT Student PT                    PT Recommendation and Plan     Plan of Care Reviewed With: patient  Progress: no change  Outcome Summary: Pt completed supine gentle BLE A/AArom exercises.  Will continue to benefit from therapy to increase strength and improve functional mmobility.          Outcome Measures     Row Name 11/09/18 0900 11/08/18 1100 11/07/18 1100       How much help from another person do you currently need...    Turning from your back to your side while in flat bed without using bedrails? 2  -CW 2  -CW 2  -AH    Moving from lying on back to sitting on the side of a flat bed without bedrails? 2  -CW 2  -CW 2  -AH    Moving to and from a bed to a chair (including a wheelchair)? 1  -CW 1  -CW 1  -AH    Standing up from a chair using your arms (e.g., wheelchair, bedside chair)? 1  -CW 1  -CW 1  -AH    Climbing 3-5 steps with a railing? 1  -CW 1  -CW 1  -AH    To walk in hospital room? 1  -CW 1  -CW 1  -AH    AM-PAC 6 Clicks Score 8  -CW 8  -CW 8  -AH       Functional Assessment    Outcome Measure Options AM-PAC 6 Clicks Basic Mobility (PT)  -CW AM-PAC 6 Clicks Basic Mobility (PT)  -CW AM-PAC 6 Clicks Basic Mobility (PT)  -AH      User Key  (r) = Recorded By, (t) = Taken By, (c) = Cosigned By    Initials Name Provider Type     Caren Nation, DELMA Physical Therapy Assistant     Mary Sylvester PTA Physical Therapy Assistant           Time Calculation:         PT Charges     Row Name 11/09/18 1404 11/09/18 0917          Time Calculation    Start Time 1336  -CW 0816  -CW      Stop Time 1400  -CW 0840  -CW     Time Calculation (min) 24 min  -CW 24 min  -CW     PT Received On 11/09/18  -CW 11/09/18  -CW     PT Goal Re-Cert Due Date 11/14/18  -CW 11/14/18  -CW        Time Calculation- PT    Total Timed Code Minutes- PT 24 minute(s)  -CW 24 minute(s)  -CW        Timed Charges    84682 - PT Therapeutic Exercise Minutes  -- 24  -CW     19321 - PT Therapeutic Activity Minutes 24  -CW  --       User Key  (r) = Recorded By, (t) = Taken By, (c) = Cosigned By    Initials Name Provider Type    CW Mary Sylvester PTA Physical Therapy Assistant        Therapy Suggested Charges     Code   Minutes Charges    73302 (CPT®) Hc Pt Neuromusc Re Education Ea 15 Min      38935 (CPT®) Hc Pt Ther Proc Ea 15 Min      56769 (CPT®) Hc Gait Training Ea 15 Min      99512 (CPT®) Hc Pt Therapeutic Act Ea 15 Min 24 2    36987 (CPT®) Hc Pt Manual Therapy Ea 15 Min      98161 (CPT®) Hc Pt Iontophoresis Ea 15 Min      09078 (CPT®) Hc Pt Elec Stim Ea-Per 15 Min      15320 (CPT®) Hc Pt Ultrasound Ea 15 Min      69980 (CPT®) Hc Pt Self Care/Mgmt/Train Ea 15 Min      52082 (CPT®) Hc Pt Prosthetic (S) Train Initial Encounter, Each 15 Min      99296 (CPT®) Hc Pt Orthotic(S)/Prosthetic(S) Encounter, Each 15 Min      49090 (CPT®) Hc Orthotic(S) Mgmt/Train Initial Encounter, Each 15min      Total  24 2        Therapy Charges for Today     Code Description Service Date Service Provider Modifiers Qty    28476236255 HC PT THERAPEUTIC ACT EA 15 MIN 11/8/2018 Mary Sylvester, PTA GP 2    88160757474 HC PT THER PROC EA 15 MIN 11/9/2018 Mary Sylvester, PTA GP 2    15770845537 HC PT THERAPEUTIC ACT EA 15 MIN 11/9/2018 Mary Sylvestre, PTA GP 2          PT G-Codes  Outcome Measure Options: AM-PAC 6 Clicks Basic Mobility (PT)  AM-PAC 6 Clicks Score: 8  Functional Limitation: Mobility: Walking and moving around  Mobility: Walking and Moving Around Current Status (): At least 60 percent but less than 80 percent  impaired, limited or restricted  Mobility: Walking and Moving Around Goal Status (): At least 40 percent but less than 60 percent impaired, limited or restricted    Mary Sylvester, PTA  11/9/2018

## 2018-11-09 NOTE — DISCHARGE PLACEMENT REQUEST
[facility] [phone]         Physical Therapy Notes (last 24 hours) (Notes from 2018 10:27 AM through 2018 10:27 AM)      Mary Sylvester PTA at 2018 11:18 AM  Version 1 of 1         Problem: Patient Care Overview  Goal: Plan of Care Review  Outcome: Ongoing (interventions implemented as appropriate)   18 1116   Coping/Psychosocial   Plan of Care Reviewed With patient   Plan of Care Review   Progress no change   OTHER   Outcome Summary Pt requires max assist of 2 for all bed mobility. SB/CGA to maintain sitting balance EOB. Gentle AAROM exercises with BLE as tolerated. Will continue to benefit from therapy to improve functional mobility..           Electronically signed by Mary Sylvester PTA at 2018 11:18 AM     Mary Sylvester PTA at 2018 11:18 AM  Version 1 of 1         Acute Care - Physical Therapy Treatment Note   [facility]     Patient Name: Burke Reinoso   : 1930  MRN: [id number]  Today's Date: 2018  Onset of Illness/Injury or Date of Surgery: 18  Date of Referral to PT: 18  Referring Physician: Dr. Colon    Admit Date: 2018    Visit Dx:    ICD-10-CM ICD-9-CM   1. Closed displaced comminuted fracture of shaft of left femur, initial encounter (CMS/[facility]) S72.352A 821.01   2. Dysphagia, unspecified type R13.10 787.20   3. Impaired mobility Z74.09 799.89     Patient Active Problem List   Diagnosis   • Closed displaced intertrochanteric fracture of left femur (CMS/[facility])   • Closed displaced comminuted fracture of shaft of left femur (CMS/[facility])       Therapy Treatment          Rehabilitation Treatment Summary     Row Name 18 1024             Treatment Time/Intention    Discipline physical therapy assistant  -CW      Document Type therapy note (daily note)  -CW2      Subjective Information complains of;pain  -CW2      Mode of Treatment physical therapy  -CW2      Existing Precautions/Restrictions fall;weight bearing   TTWB  LLE, progress slowly per Dr Isaiah WALSH2      Treatment Considerations/Comments TTWB LLE; progress slowly per Dr Isaiah WALSH2      Recorded by [CW] Mary Sylvester, PTA 11/08/18 1026  [CW2] Mary Sylvester, PTA 11/08/18 1115      Row Name 11/08/18 1024             Mobility Assessment/Intervention    Extremity Weight-bearing Status left lower extremity  -CW      Left Lower Extremity (Weight-bearing Status) toe touch weight-bearing (TTWB)  -CW      Recorded by [CW] Mary Sylvester, PTA 11/08/18 1115      Row Name 11/08/18 1024             Bed Mobility Assessment/Treatment    Supine-Sit [place] (Bed Mobility) maximum assist (25% patient effort);2 person assist;verbal cues  -CW      Sit-Supine [place] (Bed Mobility) maximum assist (25% patient effort);2 person assist;verbal cues  -CW      Bed Mobility, Safety Issues decreased use of legs for bridging/pushing  -CW      Assistive Device (Bed Mobility) draw sheet  -CW      Comment (Bed Mobility) SBA/CGA to sit EOB  -CW      Recorded by [CW] Mary Sylvester, PTA 11/08/18 1115      Row Name 11/08/18 1024             Therapeutic Exercise    Exercise Type (Therapeutic Exercise) AAROM (active assistive range of motion);AROM (active range of motion)  -CW      Position (Therapeutic Exercise) seated  -CW      Sets/Reps (Therapeutic Exercise) 10  -CW      Recorded by [CW] Mary Sylvester, PTA 11/08/18 1115      Row Name 11/08/18 1024             Positioning and Restraints    Pre-Treatment Position in bed  -CW      Post Treatment Position bed  -CW      In Bed fowlers;call light within reach;encouraged to call for assist;exit alarm on;SCD pump applied;waffle boots/both  -CW      Recorded by [CW] Mary Sylvester, PTA 11/08/18 1115      Row Name 11/08/18 1024             Pain Scale: Numbers Pre/Post-Treatment    Pain Location - Side Left  -CW      Pain Location - Orientation lower  -CW      Pain Location extremity  -CW      Pain Intervention(s)  Repositioned  -CW      Recorded by [CW] Mary Sylvester, PTA 11/08/18 1115      Row Name 11/08/18 1024             Pain Scale: FACES Pre/Post-Treatment    Pain: FACES Scale, Pretreatment 0-->no hurt  -CW      Pain: FACES Scale, Post-Treatment 4-->hurts little more  -CW      Recorded by [CW] Mary Sylvester PTA 11/08/18 1115      Row Name                Wound 10/27/18 1052 Left hip incision    Wound - Properties Group Date first assessed: 10/27/18 [AD] Time first assessed: 1052 [AD] Side: Left [AD] Location: hip [AD] Type: incision [AD] Recorded by:  [AD] Fernando Rajput RN 10/27/18 1052    Row Name                Wound 11/03/18 1237 Left leg incision    Wound - Properties Group Date first assessed: 11/03/18 [LC] Time first assessed: 1237 [LC] Side: Left [LC] Location: leg [LC] Type: incision [LC] Recorded by:  [LC] Luz Medina RN 11/03/18 1237    Row Name                Wound 11/05/18 0952 Left heel blisters    Wound - Properties Group Date first assessed: 11/05/18 [AW] Time first assessed: 0952 [AW] Present On Admission : picture taken;no [AW] Side: Left [AW] Location: heel [AW] Type: blisters [AW] Recorded by:  [AW] Jaclyn Martinez RN 11/05/18 1520    Row Name                Wound 11/07/18 0439 Left lateral heel blisters    Wound - Properties Group Date first assessed: 11/07/18 [EL] Time first assessed: 0439 [EL] Present On Admission : no;picture taken [EL] Side: Left [EL] Orientation: lateral [EL] Location: heel [EL] Type: blisters [EL] Recorded by:  [EL] Alissa Armendariz RNA 11/07/18 0440    Row Name                Wound 11/07/18 0440 Left lateral foot blisters    Wound - Properties Group Date first assessed: 11/07/18 [EL] Time first assessed: 0440 [EL] Present On Admission : no;picture taken [EL] Side: Left [EL] Orientation: lateral [EL] Location: foot [EL] Type: blisters [EL] Recorded by:  [EL] Alissa Armendariz, RNA 11/07/18 0440    Row Name                Wound 11/07/18 0700  Right heel blisters    Wound - Properties Group Date first assessed: 11/07/18 [DB] Time first assessed: 0700 [DB] Present On Admission : no;picture taken [DB] Side: Right [DB] Location: heel [DB] Type: blisters [DB2] Recorded by:  [DB] Carley White, RNA 11/07/18 1840 [DB2] Carley White, RNA 11/07/18 1841      User Key  (r) = Recorded By, (t) = Taken By, (c) = Cosigned By    Initials Name Effective Dates Discipline    CW Mary Sylvester, PTA 06/22/15 -  PT    DB Carley White, RNA 06/02/17 -  Nurse    Luz Lopez RN 08/02/16 -  Nurse    Alissa Holt, RNA 06/09/17 -  Nurse    Fernando Morales RN 08/02/17 -  Nurse    Jaclyn Pineda RN 01/12/18 -  Nurse          Wound 10/27/18 1052 Left hip incision (Active)   Dressing Appearance dry;intact;no drainage 11/7/2018  8:00 PM   Closure [place] 11/8/2018  8:04 AM   Drainage Amount none 11/8/2018  8:04 AM   Care, Wound cleansed with;soap and water 11/8/2018  8:04 AM   Dressing Care, Wound open to air 11/8/2018  8:04 AM       Wound 11/03/18 1237 Left leg incision (Active)   Dressing Appearance dry;intact;no drainage 11/8/2018  8:04 AM   Closure [place] 11/8/2018  8:04 AM   Base dressing in place, unable to visualize 11/8/2018  8:04 AM   Dressing Care, Wound dressing changed;gauze;low-adherent 11/8/2018  8:04 AM       Wound 11/05/18 0952 Left heel blisters (Active)   Dressing Appearance open to air 11/8/2018  8:04 AM   Closure Open to air 11/8/2018  8:04 AM   Periwound redness 11/7/2018  8:00 PM   Dressing Care, Wound open to air 11/8/2018  8:04 AM       Wound 11/07/18 0439 Left lateral heel blisters (Active)   Drainage Amount none 11/7/2018  8:00 PM   Dressing Care, Wound open to air 11/8/2018  8:04 AM       Wound 11/07/18 0440 Left lateral foot blisters (Active)   Drainage Amount none 11/7/2018  8:00 PM   Dressing Care, Wound open to air 11/8/2018  8:04 AM             Physical Therapy Education     Title: PT OT SLP Therapies  (Active)     Topic: Physical Therapy (Active)     Point: Mobility training (Active)    Learning Progress Summary     Learner Status Readiness Method Response Comment Documented by    Patient Active Acceptance E,D NR bed mobility, benefit of activity, plan of care  11/08/18 1115     Done Acceptance E VU Pt educated on use of bed rails to assist with rolling.  11/04/18 0817          Point: Home exercise program (Active)    Learning Progress Summary     Learner Status Readiness Method Response Comment Documented by    Patient Active Acceptance E,D NR bed mobility, benefit of activity, plan of care  11/08/18 1115     Active Acceptance E,D NR benefit of exercise, plan of caare  11/05/18 1134    Family Active Acceptance E,D NR benefit of exercise, plan of caare  11/05/18 1134          Point: Body mechanics (Active)    Learning Progress Summary     Learner Status Readiness Method Response Comment Documented by    Patient Active Acceptance E,D NR bed mobility, benefit of activity, plan of care  11/08/18 1115          Point: Precautions (Active)    Learning Progress Summary     Learner Status Readiness Method Response Comment Documented by    Patient Active Acceptance E NR   11/07/18 0539    Family Done Acceptance E,TB,D VU,DU off loading heels  11/07/18 1109                      User Key     Initials Effective Dates Name Provider Type Discipline     08/02/16 -  Caren Nation PTA Physical Therapy Assistant PT     06/22/15 -  Mary Sylvester PTA Physical Therapy Assistant PT     06/09/17 -  Alissa Armendariz RNA Registered Nurse Nurse     10/08/18 -  Andrea Roe PT Student PT Student PT                    PT Recommendation and Plan     Plan of Care Reviewed With: patient  Progress: no change  Outcome Summary: Pt requires max assist of 2 for all bed mobility.  SB/CGA to maintain sitting balance EOB.  Gentle AAROM exercises with BLE as tolerated.  Will continue to benefit from therapy  to improve functional mobility..          Outcome Measures     Row Name 11/08/18 1100 11/07/18 1100 11/06/18 1000       How much help from another person do you currently need...    Turning from your back to your side while in flat bed without using bedrails? 2  -CW 2  -AH 1  -KJ    Moving from lying on back to sitting on the side of a flat bed without bedrails? 2  -CW 2  -AH 1  -KJ    Moving to and from a bed to a chair (including a wheelchair)? 1  -CW 1  -AH 1  -KJ    Standing up from a chair using your arms (e.g., wheelchair, bedside chair)? 1  -CW 1  -AH 1  -KJ    Climbing 3-5 steps with a railing? 1  -CW 1  -AH 1  -KJ    To walk in hospital room? 1  -CW 1  -AH 1  -KJ    AM-PAC 6 Clicks Score 8  -CW 8  -AH 6  -KJ       Functional Assessment    Outcome Measure Options AM-PAC 6 Clicks Basic Mobility (PT)  -CW AM-PAC 6 Clicks Basic Mobility (PT)  -AH AM-PAC 6 Clicks Basic Mobility (PT)  -KJ      User Key  (r) = Recorded By, (t) = Taken By, (c) = Cosigned By    Initials Name Provider Type     Caren Nation, PTA Physical Therapy Assistant    Karen Brooks, PTA Physical Therapy Assistant    Mary Escobar, DELMA Physical Therapy Assistant           Time Calculation:         PT Charges     Row Name 11/08/18 1118             Time Calculation    Start Time 1024  -CW      Stop Time 1047  -CW      Time Calculation (min) 23 min  -CW      PT Received On 11/08/18  -CW      PT Goal Re-Cert Due Date 11/14/18  -CW         Time Calculation- PT    Total Timed Code Minutes- PT 23 minute(s)  -CW         Timed Charges    31284 - PT Therapeutic Activity Minutes 23  -CW        User Key  (r) = Recorded By, (t) = Taken By, (c) = Cosigned By    Initials Name Provider Type    Mary Escobar PTA Physical Therapy Assistant        Therapy Suggested Charges     Code   Minutes Charges    35319 (CPT®) Hc Pt Neuromusc Re Education Ea 15 Min      53038 (CPT®) Hc Pt Ther Proc Ea 15 Min      95370 (CPT®) Hc Gait Training Ea  15 Min      54920 (CPT®) Hc Pt Therapeutic Act Ea 15 Min 23 2    20311 (CPT®) Hc Pt Manual Therapy Ea 15 Min      10685 (CPT®) Hc Pt Iontophoresis Ea 15 Min      08790 (CPT®) Hc Pt Elec Stim Ea-Per 15 Min      27980 (CPT®) Hc Pt Ultrasound Ea 15 Min      63896 (CPT®) Hc Pt Self Care/Mgmt/Train Ea 15 Min      58180 (CPT®) Hc Pt Prosthetic (S) Train Initial Encounter, Each 15 Min      92801 (CPT®) Hc Pt Orthotic(S)/Prosthetic(S) Encounter, Each 15 Min      54366 (CPT®) Hc Orthotic(S) Mgmt/Train Initial Encounter, Each 15min      Total  23 2        Therapy Charges for Today     Code Description Service Date Service Provider Modifiers Qty    33138499652 HC PT THERAPEUTIC ACT EA 15 MIN 11/8/2018 Mary Sylvester PTA GP 2          PT G-Codes  Outcome Measure Options: AM-PAC 6 Clicks Basic Mobility (PT)  AM-PAC 6 Clicks Score: 8  Functional Limitation: Mobility: Walking and moving around  Mobility: Walking and Moving Around Current Status (): At least 60 percent but less than 80 percent impaired, limited or restricted  Mobility: Walking and Moving Around Goal Status (): At least 40 percent but less than 60 percent impaired, limited or restricted    Mary Sylvester PTA  11/8/2018         Electronically signed by Mary Sylvester PTA at 11/8/2018 11:19 AM     Mary Sylvester PTA at 11/9/2018  9:17 AM  Version 1 of 1         Problem: Patient Care Overview  Goal: Plan of Care Review  Outcome: Ongoing (interventions implemented as appropriate)   11/09/18 0915   Coping/Psychosocial   Plan of Care Reviewed With patient   Plan of Care Review   Progress no change   OTHER   Outcome Summary Pt completed supine gentle BLE A/AArom exercises. Will continue to benefit from therapy to increase strength and improve functional mobility.           Electronically signed by Mary Sylvester PTA at 11/9/2018  9:17 AM     Mary Sylvester PTA at 11/9/2018  9:18 AM  Version 1 of 1         Acute Care - Physical  Therapy Treatment Note  [facility]     Patient Name: Burke Reinoso Jr.  : 1930  MRN: [id number]  Today's Date: 2018  Onset of Illness/Injury or Date of Surgery: 18  Date of Referral to PT: 18  Referring Physician: Dr. Colon    Admit Date: 2018    Visit Dx:    ICD-10-CM ICD-9-CM   1. Closed displaced comminuted fracture of shaft of left femur, initial encounter (CMS/[facility]) S72.352A 821.01   2. Dysphagia, unspecified type R13.10 787.20   3. Impaired mobility Z74.09 799.89     Patient Active Problem List   Diagnosis   • Closed displaced intertrochanteric fracture of left femur (CMS/[facility])   • Closed displaced comminuted fracture of shaft of left femur (CMS/[facility])       Therapy Treatment          Rehabilitation Treatment Summary     Row Name 18             Treatment Time/Intention    Discipline physical therapy assistant  -CW      Document Type therapy note (daily note)  -CW2      Subjective Information complains of;pain  -CW2      Mode of Treatment physical therapy  -CW2      Existing Precautions/Restrictions fall;weight bearing   TTWB LLE, progress slowly per Dr Colon  -CW2      Treatment Considerations/Comments TTWB LLE; progress slowly per Dr Colon  -CW2      Recorded by [CW] Mary Sylvester, PTA 18  [CW2] Mary Sylvester, PTA 18      Row Name 18 08             Mobility Assessment/Intervention    Extremity Weight-bearing Status left lower extremity  -CW      Left Lower Extremity (Weight-bearing Status) toe touch weight-bearing (TTWB)  -CW      Recorded by [CW] Mary Sylvester, PTA 18      Row Name 18 0816             Lower Extremity Supine Therapeutic Exercise    Performed, Supine Lower Extremity (Therapeutic Exercise) hip abduction/adduction;SAQ (short arc quad) over bolster;heel slides;ankle pumps  -CW      Exercise Type, Supine Lower Extremity (Therapeutic Exercise) AAROM (active assistive range of motion);AROM  (active range of motion)  -CW      Sets/Reps Detail, Supine Lower Extremity (Therapeutic Exercise) 20  -CW      Comment, Supine Lower Extremity (Therapeutic Exercise) BLE  -CW      Recorded by [CW] Mary Sylvester, PTA 11/09/18 0915      Row Name 11/09/18 0816             Positioning and Restraints    Pre-Treatment Position in bed  -CW      Post Treatment Position bed  -CW      In Bed fowlers;call light within reach;encouraged to call for assist;exit alarm on;with family/caregiver;SCD pump applied;heels elevated  -CW      Recorded by [CW] Mary Sylvester, PTA 11/09/18 0915      Row Name 11/09/18 0816             Pain Scale: Numbers Pre/Post-Treatment    Pain Location - Side Left  -CW      Pain Location - Orientation lower  -CW      Pain Location extremity  -CW      Recorded by [CW] Mary Sylvester, PTA 11/09/18 0915      Row Name 11/09/18 0816             Pain Scale: FACES Pre/Post-Treatment    Pain: FACES Scale, Pretreatment 0-->no hurt  -CW      Pain: FACES Scale, Post-Treatment 4-->hurts little more   during movement  -CW      Recorded by [CW] Mary Sylvester, PTA 11/09/18 0915      Row Name                Wound 10/27/18 1052 Left hip incision    Wound - Properties Group Date first assessed: 10/27/18 [AD] Time first assessed: 1052 [AD] Side: Left [AD] Location: hip [AD] Type: incision [AD] Recorded by:  [AD] Fernando Rajput RN 10/27/18 1052    Row Name                Wound 11/03/18 1237 Left leg incision    Wound - Properties Group Date first assessed: 11/03/18 [LC] Time first assessed: 1237 [LC] Side: Left [LC] Location: leg [LC] Type: incision [LC] Recorded by:  [LC] Luz Medina RN 11/03/18 1237    Row Name                Wound 11/05/18 0952 Left heel blisters    Wound - Properties Group Date first assessed: 11/05/18 [AW] Time first assessed: 0952 [AW] Present On Admission : picture taken;no [AW] Side: Left [AW] Location: heel [AW] Type: blisters [AW] Recorded by:  [AW] Jaclyn Maritnez,  RN 11/05/18 1520    Row Name                Wound 11/07/18 0439 Left lateral heel blisters    Wound - Properties Group Date first assessed: 11/07/18 [EL] Time first assessed: 0439 [EL] Present On Admission : no;picture taken [EL] Side: Left [EL] Orientation: lateral [EL] Location: heel [EL] Type: blisters [EL] Recorded by:  [EL] Alissa Armendariz, RNA 11/07/18 0440    Row Name                Wound 11/07/18 0440 Left lateral foot blisters    Wound - Properties Group Date first assessed: 11/07/18 [EL] Time first assessed: 0440 [EL] Present On Admission : no;picture taken [EL] Side: Left [EL] Orientation: lateral [EL] Location: foot [EL] Type: blisters [EL] Recorded by:  [EL] Alissa Armendariz, RNA 11/07/18 0440    Row Name                Wound 11/07/18 0700 Right heel blisters    Wound - Properties Group Date first assessed: 11/07/18 [DB] Time first assessed: 0700 [DB] Present On Admission : no;picture taken [DB] Side: Right [DB] Location: heel [DB] Type: blisters [DB2] Recorded by:  [DB] Carley White, RNA 11/07/18 1840 [DB2] Carley White, RNA 11/07/18 1841      User Key  (r) = Recorded By, (t) = Taken By, (c) = Cosigned By    Initials Name Effective Dates Discipline    CW Mary Sylvester, PTA 06/22/15 -  PT    DB Carley White, RNA 06/02/17 -  Nurse    Luz Lopez RN 08/02/16 -  Nurse    Alissa Holt, RNA 06/09/17 -  Nurse    Fernando Morales RN 08/02/17 -  Nurse    Jaclyn Pineda RN 01/12/18 -  Nurse          Wound 10/27/18 1052 Left hip incision (Active)   Dressing Appearance dry;intact;no drainage 11/8/2018  8:00 PM   Closure [place] 11/8/2018  8:00 PM       Wound 11/03/18 1237 Left leg incision (Active)   Dressing Appearance dry;intact;no drainage 11/8/2018  8:00 PM   Base dressing in place, unable to visualize 11/8/2018  8:00 PM       Wound 11/05/18 0952 Left heel blisters (Active)   Dressing Appearance open to air 11/8/2018  8:00 PM   Dressing Care,  Wound open to air 11/8/2018  8:00 PM       Wound 11/07/18 0439 Left lateral heel blisters (Active)   Dressing Care, Wound open to air 11/8/2018  8:00 PM       Wound 11/07/18 0440 Left lateral foot blisters (Active)   Dressing Care, Wound open to air 11/8/2018  8:00 PM             Physical Therapy Education     Title: PT OT SLP Therapies (Active)     Topic: Physical Therapy (Active)     Point: Mobility training (Active)    Learning Progress Summary     Learner Status Readiness Method Response Comment Documented by    Patient Active Acceptance E,D NR bed mobility, benefit of activity, plan of care CW 11/08/18 1115     Done Acceptance E VU Pt educated on use of bed rails to assist with rolling. CB 11/04/18 0817          Point: Home exercise program (Active)    Learning Progress Summary     Learner Status Readiness Method Response Comment Documented by    Patient Active Acceptance E,D NR HEP, benefits of activity, plan of care CW 11/09/18 0915     Active Acceptance E,D NR bed mobility, benefit of activity, plan of care CW 11/08/18 1115     Active Acceptance E,D NR benefit of exercise, plan of caare  11/05/18 1134    Family Active Acceptance E,D NR HEP, benefits of activity, plan of care CW 11/09/18 0915     Active Acceptance E,D NR benefit of exercise, plan of caare  11/05/18 1134          Point: Body mechanics (Active)    Learning Progress Summary     Learner Status Readiness Method Response Comment Documented by    Patient Active Acceptance E,D NR HEP, benefits of activity, plan of care CW 11/09/18 0915     Active Acceptance E,D NR bed mobility, benefit of activity, plan of care CW 11/08/18 1115    Family Active Acceptance E,D NR HEP, benefits of activity, plan of care CW 11/09/18 0915          Point: Precautions (Active)    Learning Progress Summary     Learner Status Readiness Method Response Comment Documented by    Patient Active Acceptance E NR  EL 11/07/18 0539    Family Done Acceptance E,TB,D VU,DU off  loading heels  11/07/18 1109                      User Key     Initials Effective Dates Name Provider Type Discipline     08/02/16 -  Caren Nation PTA Physical Therapy Assistant PT     06/22/15 -  Mary Sylvester PTA Physical Therapy Assistant PT     06/09/17 -  Alissa Armendariz RNA Registered Nurse Nurse     10/08/18 -  Andrea Roe PT Student PT Student PT                    PT Recommendation and Plan     Plan of Care Reviewed With: patient  Progress: no change  Outcome Summary: Pt completed supine gentle BLE A/AArom exercises.  Will continue to benefit from therapy to increase strength and improve functional mmobility.          Outcome Measures     Row Name 11/09/18 0900 11/08/18 1100 11/07/18 1100       How much help from another person do you currently need...    Turning from your back to your side while in flat bed without using bedrails? 2  -CW 2  -CW 2  -AH    Moving from lying on back to sitting on the side of a flat bed without bedrails? 2  -CW 2  -CW 2  -AH    Moving to and from a bed to a chair (including a wheelchair)? 1  -CW 1  -CW 1  -AH    Standing up from a chair using your arms (e.g., wheelchair, bedside chair)? 1  -CW 1  -CW 1  -AH    Climbing 3-5 steps with a railing? 1  -CW 1  -CW 1  -AH    To walk in hospital room? 1  -CW 1  -CW 1  -AH    AM-PAC 6 Clicks Score 8  -CW 8  -CW 8  -AH       Functional Assessment    Outcome Measure Options AM-PAC 6 Clicks Basic Mobility (PT)  -CW AM-PAC 6 Clicks Basic Mobility (PT)  -CW AM-PAC 6 Clicks Basic Mobility (PT)  -AH    Row Name 11/06/18 1000             How much help from another person do you currently need...    Turning from your back to your side while in flat bed without using bedrails? 1  -KJ      Moving from lying on back to sitting on the side of a flat bed without bedrails? 1  -KJ      Moving to and from a bed to a chair (including a wheelchair)? 1  -KJ      Standing up from a chair using your arms (e.g., wheelchair,  bedside chair)? 1  -KJ      Climbing 3-5 steps with a railing? 1  -KJ      To walk in hospital room? 1  -KJ      AM-PAC 6 Clicks Score 6  -KJ         Functional Assessment    Outcome Measure Options AM-PAC 6 Clicks Basic Mobility (PT)  -KJ        User Key  (r) = Recorded By, (t) = Taken By, (c) = Cosigned By    Initials Name Provider Type     Caren Nation, PTA Physical Therapy Assistant    Karen Brooks, PTA Physical Therapy Assistant    Mary Escobar, DELMA Physical Therapy Assistant           Time Calculation:         PT Charges     Row Name 11/09/18 0917             Time Calculation    Start Time 0816  -CW      Stop Time 0840  -CW      Time Calculation (min) 24 min  -CW      PT Received On 11/09/18  -CW      PT Goal Re-Cert Due Date 11/14/18  -CW         Time Calculation- PT    Total Timed Code Minutes- PT 24 minute(s)  -CW         Timed Charges    60841 - PT Therapeutic Exercise Minutes 24  -CW        User Key  (r) = Recorded By, (t) = Taken By, (c) = Cosigned By    Initials Name Provider Type    Mary Escobar, DELMA Physical Therapy Assistant        Therapy Suggested Charges     Code   Minutes Charges    32945 (CPT®) Hc Pt Neuromusc Re Education Ea 15 Min      78150 (CPT®) Hc Pt Ther Proc Ea 15 Min 24 2    55003 (CPT®) Hc Gait Training Ea 15 Min      20686 (CPT®) Hc Pt Therapeutic Act Ea 15 Min      58681 (CPT®) Hc Pt Manual Therapy Ea 15 Min      28500 (CPT®) Hc Pt Iontophoresis Ea 15 Min      23607 (CPT®) Hc Pt Elec Stim Ea-Per 15 Min      12303 (CPT®) Hc Pt Ultrasound Ea 15 Min      08253 (CPT®) Hc Pt Self Care/Mgmt/Train Ea 15 Min      37633 (CPT®) Hc Pt Prosthetic (S) Train Initial Encounter, Each 15 Min      15381 (CPT®) Hc Pt Orthotic(S)/Prosthetic(S) Encounter, Each 15 Min      00978 (CPT®) Hc Orthotic(S) Mgmt/Train Initial Encounter, Each 15min      Total  24 2        Therapy Charges for Today     Code Description Service Date Service Provider Modifiers Qty    34251684319 HC PT  THERAPEUTIC ACT EA 15 MIN 11/8/2018 Mary Sylvester PTA GP 2    [id number]  PT THER PROC EA 15 MIN 11/9/2018 Mary Sylvester PTA GP 2          PT G-Codes  Outcome Measure Options: AM-PAC 6 Clicks Basic Mobility (PT)  AM-PAC 6 Clicks Score: 8  Functional Limitation: Mobility: Walking and moving around  Mobility: Walking and Moving Around Current Status (): At least 60 percent but less than 80 percent impaired, limited or restricted  Mobility: Walking and Moving Around Goal Status (): At least 40 percent but less than 60 percent impaired, limited or restricted    Mary Sylvester PTA  11/9/2018         Electronically signed by Mary Sylvester PTA at 11/9/2018  9:18 AM

## 2018-11-09 NOTE — PROGRESS NOTES
[facility]  INPATIENT PROGRESS NOTE    Length of Stay: 7  Date of Admission: 11/1/2018  Primary Care Physician: Mg Colmenares MD    Subjective   Chief Complaint: left hip pain  HPI   Sitting up in bed eating lunch. Daughter, Holly present in room.  Patient is hard of hearing but she feels as though he is having more difficulty hearing.  She is concerned that he has years of earwax.  He reports left hip discomfort.  He stood with physical therapy.  Abdomen soft.  Had good bowel movement.  He denies nausea, vomiting or abdominal pain.    Review of Systems   Constitutional: Positive for activity change (After fall) and fatigue.   HENT: Negative for congestion and trouble swallowing.    Eyes: Negative for photophobia and visual disturbance.   Respiratory: Negative for cough, shortness of breath and wheezing.    Cardiovascular: Positive for leg swelling (Left hip). Negative for chest pain and palpitations.   Gastrointestinal: Positive for constipation (resolved). Negative for diarrhea, nausea and vomiting.   Endocrine: Negative for cold intolerance, heat intolerance and polyuria.   Genitourinary: Negative for dysuria and urgency.   Musculoskeletal: Positive for gait problem and myalgias.   Skin: Positive for wound (Left hip).   Allergic/Immunologic: Negative for immunocompromised state.   Neurological: Positive for weakness.   Hematological: Negative for adenopathy. Does not bruise/bleed easily.   Psychiatric/Behavioral: Positive for confusion.   All pertinent negatives and positives are as above. All other systems have been reviewed and are negative unless otherwise stated.     Objective    Temp:  [98 °F (36.7 °C)-98.2 °F (36.8 °C)] 98 °F (36.7 °C)  Heart Rate:  [76-88] 84  Resp:  [16-18] 16  BP: (105-122)/(49-56) 120/54  Physical Exam  Constitutional: He appears well-developed and well-nourished.   Head: Normocephalic and atraumatic. Hard of hearing.  Ear wax  noted both ear canals.   Eyes: Pupils are equal, round, and reactive to light. EOM are normal. Eyeglasses in place.  Neck: Normal range of motion. Neck supple.   Cardiovascular: Normal rate, regular rhythm, normal heart sounds and intact distal pulses.  Exam reveals no gallop and no friction rub.  No murmur heard.  Pulmonary/Chest: Effort normal and breath sounds normal. No respiratory distress. He has no wheezes. He has no rales. No oxygen in use   Abdominal: Soft. Bowel sounds are normal. There is no distention or tenderness.   Genitourinary Comments: Incontinent.  Condom catheter in place.  Musculoskeletal: He exhibits edema (Left lower extremity) and tenderness (Left hip).   SCDs bilateral lower extremities   Neurological: Identifies self and family members.   Skin: Skin is warm and dry. Staples intact left hip.  Some redness at staple line. Left heel with 2 cm x 1.5 cm red area, right heel with 1 cm red area. No open areas on heels. Mepelix to heels. Prevalon boots in place.  Psychiatric: He has a normal mood and affect. His behavior is normal. Judgment and thought content normal.      Results Review:  I have reviewed the labs, radiology results, and diagnostic studies.    Laboratory Data:   No new studies today.     Scheduled Meds    apixaban 2.5 mg Oral Q12H   bisacodyl 10 mg Rectal Daily   carbamide peroxide 10 drop Both Ears Nightly   famotidine 20 mg Oral Daily   hydrochlorothiazide 25 mg Oral Daily   polyethylene glycol 17 g Oral Daily   potassium chloride 40 mEq Oral Daily   QUEtiapine 50 mg Oral Nightly   sodium chloride 3 mL Intravenous Q12H     I have reviewed the patient current medications.     Assessment/Plan     Assessment:  1.  Acute traumatic comminuted periprosthetic fracture along the proximal diaphysis of the femur with removal of pre-existing trochanteric fixation nail  and placement of long trochanteric fixation nail left femur and cable of femoral shaft fracture 11/3/18  2.  Frequent  falls  3.  Recent left intertrochanteric hip fracture with trochanteric fixation nailing on 10/27/18  4.  Acute postop blood loss anemia   5.  Acutely anticoagulated with Eliquis for deep vein thrombosis prophylaxis secondary to #3  6.  Constipation, chronic  7.  Mild protein malnutrition   8.  Essential hypertension   9.  Leukocytosis, suspected reactive  10.  Hypokalemia, resolved     Plan:  1.  Removal of pre-existing trochanteric fixation nail and placement of long trochanteric fixation nail left femur and cabling of femoral shaft fracture 11/3/18  2.  Physical therapy occupational therapy slowly per orthopedics. Stood at bedside with physical therapy.   3.  Transfused 4 units PRBC and 4 units FFP total since admission   4.  Eliquis and SCDs for deep vein thrombosis prophylaxis  5.  Debros ear drops bilaterally for ear wax.  6.  Check H/H tomorrow.  7.  Dr. Colon would like patient to remain in the hospital through the weekend.  8.   for discharge planning.  Insurance approved [facility] and [facility].  Requesting updated physical therapy notes.   indicates that precertification will have to be restarted if patient remains in hospital over the weekend.    The above documentation resulted from a face-to-face encounter by me Alejandra RUCKER, [facility].    Discharge Planning: I expect patient to be discharged to [facility] and [facility] when Dr. Colon feels patient is stable for discharge.    ALKA Abdul   11/09/18   1:16 PM     Discussed with ALKA Segal and agree with the assessment, treatment plan, and disposition of the patient as recorded by her.     Discussed with his nurse, Jaclyn.     Pre-cert was established yesterday, but Dr. Colon wishes to keep him through the weekend to reassess his operative site. Discussed with Beth Gar with SW and La Nena Bentley with case management.     Debrox for ceruminosis.     Continue Eliquis.  No H/H today; assess in the morning. Hemodynamically stable.     Mingo Ramon DO  11/09/18  1:59 PM

## 2018-11-09 NOTE — PROGRESS NOTES
Continued Stay Note   Breann     Patient Name: Burke Reinoso Jr.  MRN: [id number]  Today's Date: 11/9/2018    Admit Date: 11/1/2018          Discharge Plan     Row Name 11/09/18 1029       Plan    Plan [facility]    Patient/Family in Agreement with Plan yes    Plan Comments Noted that precert has been completed. Had a message from Srinivasan from the [phone] that they will need updated PT notes in case pt does not d/c before the weekend. Have faxed those to her at [phone].              Discharge Codes    No documentation.           FAZAL Chan

## 2018-11-09 NOTE — PLAN OF CARE
Problem: Patient Care Overview  Goal: Plan of Care Review  Outcome: Ongoing (interventions implemented as appropriate)   11/09/18 4240   Coping/Psychosocial   Plan of Care Reviewed With patient;daughter   Plan of Care Review   Progress no change   OTHER   Outcome Summary Pt alert but still has confusion. Pt is pleasant except when turned. Staples removed from bottom incision per Dr. Colon. Dressing placed over and is CDI. Pt turned Q2. Prevalon boots on. Condom cath in place to keep pt from skin breakdown from incontinence. VSS. Tolerating diet well. Safety maintained. Hopefully d/c to NH next week.      Goal: Individualization and Mutuality  Outcome: Ongoing (interventions implemented as appropriate)    Goal: Discharge Needs Assessment  Outcome: Ongoing (interventions implemented as appropriate)      Problem: Fall Risk (Adult)  Goal: Absence of Fall  Outcome: Ongoing (interventions implemented as appropriate)      Problem: Nutrition, Imbalanced: Inadequate Oral Intake (Adult)  Goal: Improved Oral Intake  Outcome: Ongoing (interventions implemented as appropriate)    Goal: Prevent Further Weight Loss  Outcome: Ongoing (interventions implemented as appropriate)      Problem: Hip Arthroplasty (Total, Partial) (Adult)  Goal: Signs and Symptoms of Listed Potential Problems Will be Absent, Minimized or Managed (Hip Arthroplasty)  Outcome: Ongoing (interventions implemented as appropriate)

## 2018-11-10 LAB
HCT VFR BLD AUTO: 28.7 % (ref 40–52)
HGB BLD-MCNC: 9.2 G/DL (ref 14–18)

## 2018-11-10 PROCEDURE — 97530 THERAPEUTIC ACTIVITIES: CPT

## 2018-11-10 PROCEDURE — 97110 THERAPEUTIC EXERCISES: CPT

## 2018-11-10 PROCEDURE — 85018 HEMOGLOBIN: CPT | Performed by: NURSE PRACTITIONER

## 2018-11-10 PROCEDURE — 85014 HEMATOCRIT: CPT | Performed by: NURSE PRACTITIONER

## 2018-11-10 RX ADMIN — HYDROCHLOROTHIAZIDE 25 MG: 25 TABLET ORAL at 08:14

## 2018-11-10 RX ADMIN — FAMOTIDINE 20 MG: 20 TABLET, FILM COATED ORAL at 08:14

## 2018-11-10 RX ADMIN — HYDROCODONE BITARTRATE AND ACETAMINOPHEN 1 TABLET: 5; 325 TABLET ORAL at 08:47

## 2018-11-10 RX ADMIN — CARBAMIDE PEROXIDE 6.5% 10 DROP: 6.5 LIQUID AURICULAR (OTIC) at 20:05

## 2018-11-10 RX ADMIN — APIXABAN 2.5 MG: 2.5 TABLET, FILM COATED ORAL at 07:37

## 2018-11-10 RX ADMIN — POTASSIUM CHLORIDE 40 MEQ: 750 CAPSULE, EXTENDED RELEASE ORAL at 08:14

## 2018-11-10 RX ADMIN — APIXABAN 2.5 MG: 2.5 TABLET, FILM COATED ORAL at 17:06

## 2018-11-10 RX ADMIN — HYDROCODONE BITARTRATE AND ACETAMINOPHEN 1 TABLET: 5; 325 TABLET ORAL at 20:05

## 2018-11-10 RX ADMIN — POLYETHYLENE GLYCOL 3350 17 G: 17 POWDER, FOR SOLUTION ORAL at 08:14

## 2018-11-10 RX ADMIN — QUETIAPINE FUMARATE 50 MG: 25 TABLET, FILM COATED ORAL at 20:05

## 2018-11-10 NOTE — PLAN OF CARE
Problem: Skin Injury Risk (Adult)  Goal: Skin Health and Integrity  Outcome: Ongoing (interventions implemented as appropriate)      Problem: Patient Care Overview  Goal: Plan of Care Review  Outcome: Ongoing (interventions implemented as appropriate)   11/10/18 0310   Coping/Psychosocial   Plan of Care Reviewed With patient   Plan of Care Review   Progress no change   OTHER   Outcome Summary Turn q2, condom cath on, no new breakdown. Patient completed his supper at shift change. Denies pain except for when turned. Free from falls.        Problem: Fall Risk (Adult)  Goal: Absence of Fall  Outcome: Ongoing (interventions implemented as appropriate)      Problem: Nutrition, Imbalanced: Inadequate Oral Intake (Adult)  Goal: Improved Oral Intake  Outcome: Ongoing (interventions implemented as appropriate)      Problem: Hip Arthroplasty (Total, Partial) (Adult)  Goal: Signs and Symptoms of Listed Potential Problems Will be Absent, Minimized or Managed (Hip Arthroplasty)  Outcome: Ongoing (interventions implemented as appropriate)

## 2018-11-10 NOTE — PLAN OF CARE
Problem: Skin Injury Risk (Adult)  Goal: Skin Health and Integrity  Outcome: Ongoing (interventions implemented as appropriate)      Problem: Patient Care Overview  Goal: Plan of Care Review  Outcome: Ongoing (interventions implemented as appropriate)   11/10/18 1500   Coping/Psychosocial   Plan of Care Reviewed With patient   Plan of Care Review   Progress improving   OTHER   Outcome Summary VSS. Minimal complaints of pain. Patient able to bear weight with PT. Turned q2h. Mepilexes changed. Patient incont.      Goal: Individualization and Mutuality  Outcome: Ongoing (interventions implemented as appropriate)    Goal: Discharge Needs Assessment  Outcome: Ongoing (interventions implemented as appropriate)    Goal: Interprofessional Rounds/Family Conf  Outcome: Ongoing (interventions implemented as appropriate)      Problem: Fall Risk (Adult)  Goal: Absence of Fall  Outcome: Ongoing (interventions implemented as appropriate)      Problem: Nutrition, Imbalanced: Inadequate Oral Intake (Adult)  Goal: Improved Oral Intake  Outcome: Ongoing (interventions implemented as appropriate)    Goal: Prevent Further Weight Loss  Outcome: Ongoing (interventions implemented as appropriate)      Problem: Hip Arthroplasty (Total, Partial) (Adult)  Goal: Signs and Symptoms of Listed Potential Problems Will be Absent, Minimized or Managed (Hip Arthroplasty)  Outcome: Ongoing (interventions implemented as appropriate)

## 2018-11-10 NOTE — THERAPY TREATMENT NOTE
Acute Care - Physical Therapy Treatment Note  [facility]     Patient Name: Burke Reinoso Jr.  : 1930  MRN: [id number]  Today's Date: 11/10/2018  Onset of Illness/Injury or Date of Surgery: 18  Date of Referral to : 18  Referring Physician: Dr. Colon    Admit Date: 2018    Visit Dx:    ICD-10-CM ICD-9-CM   1. Closed displaced comminuted fracture of shaft of left femur, initial encounter (CMS/[facility]) S72.352A 821.01   2. Dysphagia, unspecified type R13.10 787.20   3. Impaired mobility Z74.09 799.89     Patient Active Problem List   Diagnosis   • Closed displaced intertrochanteric fracture of left femur (CMS/[facility])   • Closed displaced comminuted fracture of shaft of left femur (CMS/[facility])       Therapy Treatment    Rehabilitation Treatment Summary     Row Name 11/10/18 1013             Treatment Time/Intention    Discipline  physical therapy assistant  -      Document Type  therapy note (daily note)  -2      Subjective Information  complains of;pain;weakness  -2      Existing Precautions/Restrictions  fall;left;weight bearing TTWB LLE  -2      Treatment Considerations/Comments  [place]  -2      Recorded by [] Caren Nation, PTA 11/10/18 1013  [2] Caren Nation, PTA 11/10/18 1049      Row Name 11/10/18 1013             Mobility Assessment/Intervention    Extremity Weight-bearing Status  left lower extremity  (Significant)   -      Left Lower Extremity (Weight-bearing Status)  toe touch weight-bearing (TTWB)  (Significant)   -      Recorded by [] Caren Nation, PTA 11/10/18 1049      Row Name 11/10/18 1013             Bed Mobility Assessment/Treatment    Supine-Sit [place] (Bed Mobility)  moderate assist (50% patient effort);2 person assist;verbal cues  -      Sit-Supine [place] (Bed Mobility)  moderate assist (50% patient effort);maximum assist (25% patient effort);2 person assist;verbal cues  -      Recorded by [] Caren Nation, PTA 11/10/18 1049      Row  Name 11/10/18 1013             Transfer Assessment/Treatment    Transfer Assessment/Treatment  sit-stand transfer;stand-sit transfer  -      Maintains Weight-bearing Status (Transfers)  able to maintain;verbal cues to maintain;nonverbal cues (demo/gesture) to maintain;physical assist to maintain  -      Recorded by [] Caren Nation, PTA 11/10/18 1049      Row Name 11/10/18 1013             Sit-Stand Transfer    Sit-Stand [place] (Transfers)  moderate assist (50% patient effort);2 person assist;verbal cues  -      Assistive Device (Sit-Stand Transfers)  walker, front-wheeled  -      Recorded by [] Caren Nation, PTA 11/10/18 1049      Row Name 11/10/18 1013             Stand-Sit Transfer    Stand-Sit [place] (Transfers)  moderate assist (50% patient effort);2 person assist;verbal cues  -      Recorded by [] Caren Nation, PTA 11/10/18 1049      Row Name 11/10/18 1013             Gait/Stairs Assessment/Training    Comment (Gait/Stairs)  not appropriate  -      Recorded by [] Caren Nation, PTA 11/10/18 1049      Row Name 11/10/18 1013             Therapeutic Exercise    Lower Extremity (Therapeutic Exercise)  LAQ (long arc quad), bilateral;marching while standing  -      Lower Extremity Range of Motion (Therapeutic Exercise)  ankle dorsiflexion/plantar flexion, bilateral  -      Position (Therapeutic Exercise)  seated  -      Sets/Reps (Therapeutic Exercise)  10  -      Recorded by [] Caren Nation, PTA 11/10/18 1049      Row Name 11/10/18 1013             Positioning and Restraints    Pre-Treatment Position  in bed  -      Post Treatment Position  bed  -AH      In Bed  side lying right;fowlers;call light within reach;encouraged to call for assist;exit alarm on;notified nsg;SCD pump applied;waffle boots/both  -      Recorded by [] Caren Nation, PTA 11/10/18 1049      Row Name 11/10/18 1013             Pain Assessment    Additional Documentation  Pain Scale:  FACES Pre/Post-Treatment (Group)  -AH      Recorded by [] Caern Nation, PTA 11/10/18 1049      Row Name 11/10/18 1013             Pain Scale: Numbers Pre/Post-Treatment    Pain Location - Side  Left  -AH      Pain Location - Orientation  lower  -AH      Pain Location  extremity  -AH      Pain Intervention(s)  Home medication;Repositioned  -AH      Recorded by [] Caren Nation, PTA 11/10/18 1049      Row Name 11/10/18 1013             Pain Scale: FACES Pre/Post-Treatment    Pain: FACES Scale, Pretreatment  0-->no hurt  -      Pain: FACES Scale, Post-Treatment  4-->hurts little more  -      Pre/Post Treatment Pain Comment  with bed mobility  -AH      Recorded by [] Caren Nation, PTA 11/10/18 1049      Row Name                Wound 10/27/18 1052 Left hip incision    Wound - Properties Group Date first assessed: 10/27/18 [AD] Time first assessed: 1052 [AD] Side: Left [AD] Location: hip [AD] Type: incision [AD] Recorded by:  [AD] Fernando Rajput RN 10/27/18 1052    Row Name                Wound 11/03/18 1237 Left leg incision    Wound - Properties Group Date first assessed: 11/03/18 [LC] Time first assessed: 1237 [LC] Side: Left [LC] Location: leg [LC] Type: incision [LC] Recorded by:  [LC] Luz Medina RN 11/03/18 1237    Row Name                Wound 11/05/18 0952 Left heel blisters    Wound - Properties Group Date first assessed: 11/05/18 [AW] Time first assessed: 0952 [AW] Present On Admission : picture taken;no [AW] Side: Left [AW] Location: heel [AW] Type: blisters [AW] Recorded by:  [AW] Jaclyn Martinez RN 11/05/18 1520    Row Name                Wound 11/07/18 0439 Left lateral heel blisters    Wound - Properties Group Date first assessed: 11/07/18 [EL] Time first assessed: 0439 [EL] Present On Admission : no;picture taken [EL] Side: Left [EL] Orientation: lateral [EL] Location: heel [EL] Type: blisters [EL] Recorded by:  [EL] Alissa Armendariz, RNA 11/07/18 0440    Row Name                 Wound 11/07/18 0440 Left lateral foot blisters    Wound - Properties Group Date first assessed: 11/07/18 [EL] Time first assessed: 0440 [EL] Present On Admission : no;picture taken [EL] Side: Left [EL] Orientation: lateral [EL] Location: foot [EL] Type: blisters [EL] Recorded by:  [EL] Alissa Armendariz, RNA 11/07/18 0440    Row Name                Wound 11/07/18 0700 Right heel blisters    Wound - Properties Group Date first assessed: 11/07/18 [DB] Time first assessed: 0700 [DB] Present On Admission : no;picture taken [DB] Side: Right [DB] Location: heel [DB] Type: blisters [DB2] Stage, Pressure Injury: -- [DB2] Recorded by:  [DB] Carley White, RNA 11/07/18 1840 [DB2] Carley White, RNA 11/07/18 1841      User Key  (r) = Recorded By, (t) = Taken By, (c) = Cosigned By    Initials Name Effective Dates Discipline     Caren Nation, PTA 08/02/16 -  PT    Carley Aparicio, RNA 06/02/17 -  Nurse    Luz Lopez RN 08/02/16 -  Nurse    Alissa Holt, RNA 06/09/17 -  Nurse    Fernando Morales RN 08/02/17 -  Nurse    Jaclyn Pineda RN 01/12/18 -  Nurse          Wound 10/27/18 1052 Left hip incision (Active)   Dressing Appearance intact;no drainage;dry 11/9/2018  8:00 PM   Closure None 11/10/2018  8:00 AM   Drainage Amount none 11/9/2018  4:28 PM   Dressing Care, Wound gauze;low-adherent 11/10/2018  8:00 AM   Staples Removed Intact Yes 11/9/2018  4:28 PM   Number of Staples Removed 6 11/9/2018  4:28 PM       Wound 11/03/18 1237 Left leg incision (Active)   Dressing Appearance dry;intact;no drainage 11/10/2018  8:00 AM   Closure Staples 11/10/2018  8:00 AM   Base dressing in place, unable to visualize 11/10/2018  8:00 AM   Drainage Amount none 11/10/2018  8:00 AM   Dressing Care, Wound gauze;low-adherent 11/10/2018  8:00 AM       Wound 11/05/18 0952 Left heel blisters (Active)   Dressing Appearance open to air 11/10/2018  8:00 AM   Closure Open to air 11/10/2018   8:00 AM       Wound 11/07/18 0439 Left lateral heel blisters (Active)   Dressing Appearance open to air 11/9/2018  8:00 PM       Wound 11/07/18 0440 Left lateral foot blisters (Active)   Dressing Appearance open to air 11/9/2018  8:00 PM       Wound 11/07/18 0700 Right heel blisters (Active)   Dressing Appearance open to air 11/9/2018  8:00 PM           Physical Therapy Education     Title: PT OT SLP Therapies (Active)     Topic: Physical Therapy (Active)     Point: Mobility training (Active)     Learning Progress Summary           Patient Acceptance, E,D, NR by CW at 11/8/2018 11:15 AM    Comment:  bed mobility, benefit of activity, plan of care    Acceptance, E, VU by CB at 11/4/2018  8:17 AM    Comment:  Pt educated on use of bed rails to assist with rolling.                   Point: Home exercise program (Active)     Learning Progress Summary           Patient Acceptance, E,D, NR by CW at 11/9/2018  9:15 AM    Comment:  HEP, benefits of activity, plan of care    Acceptance, E,D, NR by CW at 11/8/2018 11:15 AM    Comment:  bed mobility, benefit of activity, plan of care    Acceptance, E,D, NR by CW at 11/5/2018 11:34 AM    Comment:  benefit of exercise, plan of caare   Family Acceptance, E,D, NR by CW at 11/9/2018  9:15 AM    Comment:  HEP, benefits of activity, plan of care    Acceptance, E,D, NR by CW at 11/5/2018 11:34 AM    Comment:  benefit of exercise, plan of caare                   Point: Body mechanics (Active)     Learning Progress Summary           Patient Acceptance, E,D, NR by CW at 11/9/2018  9:15 AM    Comment:  HEP, benefits of activity, plan of care    Acceptance, E,D, NR by CW at 11/8/2018 11:15 AM    Comment:  bed mobility, benefit of activity, plan of care   Family Acceptance, E,D, NR by CW at 11/9/2018  9:15 AM    Comment:  HEP, benefits of activity, plan of care                   Point: Precautions (Done)     Learning Progress Summary           Patient Acceptance, E,TB,D, VU,NR by  at  11/10/2018 10:49 AM    Comment:  TTWB LLE    JOSE LUIS Lund, NR by  at 11/7/2018  5:39 AM   Family Acceptance, JOSE LUIS,TB,D, VU,DU by  at 11/7/2018 11:09 AM    Comment:  off loading heels                               User Key     Initials Effective Dates Name Provider Type Discipline     08/02/16 -  Caren Nation, PTA Physical Therapy Assistant PT     06/22/15 -  Mary Sylvester PTA Physical Therapy Assistant PT     06/09/17 -  Alissa Armendariz RNA Registered Nurse Nurse     10/08/18 -  Andrea Roe PT Student PT Student PT                PT Recommendation and Plan     Plan of Care Reviewed With: patient  Progress: improving  Outcome Summary: pt trans to EOB mod of 2, sat EOB performed BLE ROM, sit-stand with bed elevated mod of 2, pt was able to maintain TTWB LLE. Pt trans back to bed mod-max of 2 Pt would benefit from SNF  Outcome Measures     Row Name 11/10/18 1000 11/09/18 0900 11/08/18 1100       How much help from another person do you currently need...    Turning from your back to your side while in flat bed without using bedrails?  2  -  2  -CW  2  -CW    Moving from lying on back to sitting on the side of a flat bed without bedrails?  2  -  2  -CW  2  -CW    Moving to and from a bed to a chair (including a wheelchair)?  1  -  1  -CW  1  -CW    Standing up from a chair using your arms (e.g., wheelchair, bedside chair)?  1  -  1  -CW  1  -CW    Climbing 3-5 steps with a railing?  1  -  1  -CW  1  -CW    To walk in hospital room?  1  -  1  -CW  1  -CW    AM-PAC 6 Clicks Score  8  -  8  -CW  8  -CW       Functional Assessment    Outcome Measure Options  AM-PAC 6 Clicks Basic Mobility (PT)  -  AM-PAC 6 Clicks Basic Mobility (PT)  -CW  AM-PAC 6 Clicks Basic Mobility (PT)  -CW    Row Name 11/07/18 1100             How much help from another person do you currently need...    Turning from your back to your side while in flat bed without using bedrails?  2  -      Moving  from lying on back to sitting on the side of a flat bed without bedrails?  2  -AH      Moving to and from a bed to a chair (including a wheelchair)?  1  -AH      Standing up from a chair using your arms (e.g., wheelchair, bedside chair)?  1  -AH      Climbing 3-5 steps with a railing?  1  -AH      To walk in hospital room?  1  -AH      AM-PAC 6 Clicks Score  8  -AH         Functional Assessment    Outcome Measure Options  AM-PAC 6 Clicks Basic Mobility (PT)  -        User Key  (r) = Recorded By, (t) = Taken By, (c) = Cosigned By    Initials Name Provider Type     Caren Nation, PTA Physical Therapy Assistant    Mary Escobar, DELMA Physical Therapy Assistant         Time Calculation:   PT Charges     Row Name 11/10/18 1058             Time Calculation    Start Time  1013  -      Stop Time  1043  -      Time Calculation (min)  30 min  -      PT Received On  11/10/18  -      PT Goal Re-Cert Due Date  11/14/18  -         Time Calculation- PT    Total Timed Code Minutes- PT  30 minute(s)  -         Timed Charges    90989 - PT Therapeutic Activity Minutes  30  -AH        User Key  (r) = Recorded By, (t) = Taken By, (c) = Cosigned By    Initials Name Provider Type     Caren Nation, PTA Physical Therapy Assistant        Therapy Suggested Charges     Code   Minutes Charges    10848 (CPT®) Hc Pt Neuromusc Re Education Ea 15 Min      59692 (CPT®) Hc Pt Ther Proc Ea 15 Min      60163 (CPT®) Hc Gait Training Ea 15 Min      32049 (CPT®) Hc Pt Therapeutic Act Ea 15 Min 30 2    54659 (CPT®) Hc Pt Manual Therapy Ea 15 Min      02844 (CPT®) Hc Pt Iontophoresis Ea 15 Min      98767 (CPT®) Hc Pt Elec Stim Ea-Per 15 Min      16395 (CPT®) Hc Pt Ultrasound Ea 15 Min      43724 (CPT®) Hc Pt Self Care/Mgmt/Train Ea 15 Min      90180 (CPT®) Hc Pt Prosthetic (S) Train Initial Encounter, Each 15 Min      24512 (CPT®) Hc Pt Orthotic(S)/Prosthetic(S) Encounter, Each 15 Min      17333 (CPT®) Hc Orthotic(S)  Mgmt/Train Initial Encounter, Each 15min      Total  30 2        Therapy Charges for Today     Code Description Service Date Service Provider Modifiers Qty    [id number] HC PT THERAPEUTIC ACT EA 15 MIN 11/10/2018 Caren Nation, PTA GP 2          PT G-Codes  Outcome Measure Options: AM-PAC 6 Clicks Basic Mobility (PT)  AM-PAC 6 Clicks Score: 8  Functional Limitation: Mobility: Walking and moving around  Mobility: Walking and Moving Around Current Status (): At least 60 percent but less than 80 percent impaired, limited or restricted  Mobility: Walking and Moving Around Goal Status (): At least 40 percent but less than 60 percent impaired, limited or restricted    Caren Nation, PTA  11/10/2018

## 2018-11-10 NOTE — THERAPY TREATMENT NOTE
Acute Care - Physical Therapy Treatment Note  [facility]     Patient Name: Burke Reinoso Jr.  : 1930  MRN: [id number]  Today's Date: 11/10/2018  Onset of Illness/Injury or Date of Surgery: 18  Date of Referral to PT: 18  Referring Physician: Dr. Colon    Admit Date: 2018    Visit Dx:    ICD-10-CM ICD-9-CM   1. Closed displaced comminuted fracture of shaft of left femur, initial encounter (CMS/[facility]) S72.352A 821.01   2. Dysphagia, unspecified type R13.10 787.20   3. Impaired mobility Z74.09 799.89     Patient Active Problem List   Diagnosis   • Closed displaced intertrochanteric fracture of left femur (CMS/[facility])   • Closed displaced comminuted fracture of shaft of left femur (CMS/[facility])       Therapy Treatment    Rehabilitation Treatment Summary     Row Name 11/10/18 1512 11/10/18 1013          Treatment Time/Intention    Discipline  physical therapy assistant  -AL  physical therapy assistant  -     Document Type  therapy note (daily note)  -AL  therapy note (daily note)  -[facility]     Subjective Information  no complaints  -AL2  complains of;pain;weakness  -2     Mode of Treatment  physical therapy  -AL  --     Existing Precautions/Restrictions  --  fall;left;weight bearing TTWB LLE  -[facility]     Treatment Considerations/Comments  --  [place]  -2     Recorded by [AL] Caren Post, PTA 11/10/18 1513  [AL2] Caren Post, PTA 11/10/18 1530 [AH] Caren Nation, PTA 11/10/18 1013  [AH2] Caren Nation, PTA 11/10/18 1049     Row Name 11/10/18 1013             Mobility Assessment/Intervention    Extremity Weight-bearing Status  left lower extremity  (Significant)   -      Left Lower Extremity (Weight-bearing Status)  toe touch weight-bearing (TTWB)  (Significant)   -      Recorded by [] Caren Nation, PTA 11/10/18 1049      Row Name 11/10/18 1013             Bed Mobility Assessment/Treatment    Supine-Sit [place] (Bed Mobility)  moderate assist (50% patient effort);2 person  assist;verbal cues  -AH      Sit-Supine [place] (Bed Mobility)  moderate assist (50% patient effort);maximum assist (25% patient effort);2 person assist;verbal cues  -AH      Recorded by [] Caren Nation, PTA 11/10/18 1049      Row Name 11/10/18 1013             Transfer Assessment/Treatment    Transfer Assessment/Treatment  sit-stand transfer;stand-sit transfer  -      Maintains Weight-bearing Status (Transfers)  able to maintain;verbal cues to maintain;nonverbal cues (demo/gesture) to maintain;physical assist to maintain  -      Recorded by [] Caren Nation, PTA 11/10/18 1049      Row Name 11/10/18 1013             Sit-Stand Transfer    Sit-Stand [place] (Transfers)  moderate assist (50% patient effort);2 person assist;verbal cues  -      Assistive Device (Sit-Stand Transfers)  walker, front-wheeled  -AH      Recorded by [] Caren Nation, PTA 11/10/18 1049      Row Name 11/10/18 1013             Stand-Sit Transfer    Stand-Sit [place] (Transfers)  moderate assist (50% patient effort);2 person assist;verbal cues  -      Recorded by [] Caren Nation, PTA 11/10/18 1049      Row Name 11/10/18 1013             Gait/Stairs Assessment/Training    Comment (Gait/Stairs)  not appropriate  -      Recorded by [] Caren Nation, PTA 11/10/18 1049      Row Name 11/10/18 1512             Lower Extremity Supine Therapeutic Exercise    Performed, Supine Lower Extremity (Therapeutic Exercise)  hip flexion/extension;hip abduction/adduction;SAQ (short arc quad) over bolster;SLR (straight leg raise);ankle dorsiflexion/plantarflexion;heel slides AARM, supported heels when exercising  -AL      Recorded by [AL] Caren Post, PTA 11/10/18 1530      Row Name 11/10/18 1013             Therapeutic Exercise    Lower Extremity (Therapeutic Exercise)  LAQ (long arc quad), bilateral;marching while standing  -      Lower Extremity Range of Motion (Therapeutic Exercise)  ankle dorsiflexion/plantar  flexion, bilateral  -AH      Position (Therapeutic Exercise)  seated  -AH      Sets/Reps (Therapeutic Exercise)  10  -AH      Recorded by [] Caren Nation, PTA 11/10/18 1049      Row Name 11/10/18 1512 11/10/18 1013          Positioning and Restraints    Pre-Treatment Position  in bed  -AL  in bed  -AH     Post Treatment Position  bed  -AL  bed  -AH     In Bed  call light within reach;fowlers;exit alarm on;side rails up x2  -AL  side lying right;fowlers;call light within reach;encouraged to call for assist;exit alarm on;notified nsg;SCD pump applied;waffle boots/both  -     Recorded by [AL] Caren Post, PTA 11/10/18 1530 [] Caren Nation, PTA 11/10/18 1049     Row Name 11/10/18 1512 11/10/18 1013          Pain Assessment    Additional Documentation  Pain Scale: FACES Pre/Post-Treatment (Group)  -AL  Pain Scale: FACES Pre/Post-Treatment (Group)  -     Recorded by [AL] Caren Post, PTA 11/10/18 1530 [] Caren Nation, PTA 11/10/18 1049     Row Name 11/10/18 1013             Pain Scale: Numbers Pre/Post-Treatment    Pain Location - Side  Left  -AH      Pain Location - Orientation  lower  -AH      Pain Location  extremity  -AH      Pain Intervention(s)  Home medication;Repositioned  -      Recorded by [AH] Caren Nation, PTA 11/10/18 1049      Row Name 11/10/18 1512 11/10/18 1013          Pain Scale: FACES Pre/Post-Treatment    Pain: FACES Scale, Pretreatment  0-->no hurt  -AL  0-->no hurt  -AH     Pain: FACES Scale, Post-Treatment  0-->no hurt  -AL  4-->hurts little more  -AH     Pre/Post Treatment Pain Comment  --  with bed mobility  -     Recorded by [AL] Caren Post, PTA 11/10/18 1530 [] Caren Nation, PTA 11/10/18 1049     Row Name                Wound 10/27/18 1052 Left hip incision    Wound - Properties Group Date first assessed: 10/27/18 [AD] Time first assessed: 1052 [AD] Side: Left [AD] Location: hip [AD] Type: incision [AD] Recorded by:  [AD] Fernando Rajput RN 10/27/18 5752     Row Name                Wound 11/03/18 1237 Left leg incision    Wound - Properties Group Date first assessed: 11/03/18 [LC] Time first assessed: 1237 [LC] Side: Left [LC] Location: leg [LC] Type: incision [LC] Recorded by:  [LC] Luz Medina RN 11/03/18 1237    Row Name                Wound 11/05/18 0952 Left heel blisters    Wound - Properties Group Date first assessed: 11/05/18 [AW] Time first assessed: 0952 [AW] Present On Admission : picture taken;no [AW] Side: Left [AW] Location: heel [AW] Type: blisters [AW] Recorded by:  [AW] Jaclyn Martinez RN 11/05/18 1520    Row Name                Wound 11/07/18 0439 Left lateral heel blisters    Wound - Properties Group Date first assessed: 11/07/18 [EL] Time first assessed: 0439 [EL] Present On Admission : no;picture taken [EL] Side: Left [EL] Orientation: lateral [EL] Location: heel [EL] Type: blisters [EL] Recorded by:  [EL] Alissa Armendariz RNA 11/07/18 0440    Row Name                Wound 11/07/18 0440 Left lateral foot blisters    Wound - Properties Group Date first assessed: 11/07/18 [EL] Time first assessed: 0440 [EL] Present On Admission : no;picture taken [EL] Side: Left [EL] Orientation: lateral [EL] Location: foot [EL] Type: blisters [EL] Recorded by:  [EL] Alissa Armendariz RNA 11/07/18 0440    Row Name                Wound 11/07/18 0700 Right heel blisters    Wound - Properties Group Date first assessed: 11/07/18 [DB] Time first assessed: 0700 [DB] Present On Admission : no;picture taken [DB] Side: Right [DB] Location: heel [DB] Type: blisters [DB2] Stage, Pressure Injury: -- [DB2] Recorded by:  [DB] Carley White RNA 11/07/18 1840 [DB2] Carley White RNA 11/07/18 1841      User Key  (r) = Recorded By, (t) = Taken By, (c) = Cosigned By    Initials Name Effective Dates Discipline    Caren Ellington, PTA 08/02/16 -  PT    AL Caren Post, PTA 08/02/16 -  PT    Carley Aparicio, RNA 06/02/17 -  Nurse    NENA Medina,  Luz LEVINE RN 08/02/16 -  Nurse    Alissa Holt RNA 06/09/17 -  Nurse    ALEX Rajput, Fernando GROVE RN 08/02/17 -  Nurse    Jaclyn Pineda RN 01/12/18 -  Nurse          Wound 10/27/18 1052 Left hip incision (Active)   Dressing Appearance intact;no drainage;dry 11/9/2018  8:00 PM   Closure None 11/10/2018  8:00 AM   Drainage Amount none 11/9/2018  4:28 PM   Dressing Care, Wound gauze;low-adherent 11/10/2018  8:00 AM   Staples Removed Intact Yes 11/9/2018  4:28 PM   Number of Staples Removed 6 11/9/2018  4:28 PM       Wound 11/03/18 1237 Left leg incision (Active)   Dressing Appearance dry;intact;no drainage 11/10/2018  8:00 AM   Closure Staples 11/10/2018  8:00 AM   Base dressing in place, unable to visualize 11/10/2018  8:00 AM   Drainage Amount none 11/10/2018  8:00 AM   Dressing Care, Wound dressing changed;gauze;low-adherent 11/10/2018 10:17 AM       Wound 11/05/18 0952 Left heel blisters (Active)   Dressing Appearance open to air 11/10/2018  8:00 AM   Closure Open to air 11/10/2018  8:00 AM       Wound 11/07/18 0439 Left lateral heel blisters (Active)   Dressing Appearance open to air 11/9/2018  8:00 PM       Wound 11/07/18 0440 Left lateral foot blisters (Active)   Dressing Appearance open to air 11/9/2018  8:00 PM       Wound 11/07/18 0700 Right heel blisters (Active)   Dressing Appearance open to air 11/9/2018  8:00 PM           Physical Therapy Education     Title: PT OT SLP Therapies (Active)     Topic: Physical Therapy (Active)     Point: Mobility training (Active)     Learning Progress Summary           Patient Acceptance, E,D, NR by CW at 11/8/2018 11:15 AM    Comment:  bed mobility, benefit of activity, plan of care    Acceptance, E, VU by CB at 11/4/2018  8:17 AM    Comment:  Pt educated on use of bed rails to assist with rolling.                   Point: Home exercise program (Active)     Learning Progress Summary           Patient Acceptance, E,D, NR by CW at 11/9/2018  9:15 AM     Comment:  HEP, benefits of activity, plan of care    Acceptance, E,D, NR by CW at 11/8/2018 11:15 AM    Comment:  bed mobility, benefit of activity, plan of care    Acceptance, E,D, NR by CW at 11/5/2018 11:34 AM    Comment:  benefit of exercise, plan of caare   Family Acceptance, E,D, NR by CW at 11/9/2018  9:15 AM    Comment:  HEP, benefits of activity, plan of care    Acceptance, E,D, NR by CW at 11/5/2018 11:34 AM    Comment:  benefit of exercise, plan of caare                   Point: Body mechanics (Active)     Learning Progress Summary           Patient Acceptance, E,D, NR by CW at 11/9/2018  9:15 AM    Comment:  HEP, benefits of activity, plan of care    Acceptance, E,D, NR by CW at 11/8/2018 11:15 AM    Comment:  bed mobility, benefit of activity, plan of care   Family Acceptance, E,D, NR by CW at 11/9/2018  9:15 AM    Comment:  HEP, benefits of activity, plan of care                   Point: Precautions (Done)     Learning Progress Summary           Patient Acceptance, E,TB,D, VU,NR by  at 11/10/2018 10:49 AM    Comment:  TTWB LLE    Acceptance, E, NR by  at 11/7/2018  5:39 AM   Family Acceptance, E,TB,D, VU,DU by  at 11/7/2018 11:09 AM    Comment:  off loading heels                               User Key     Initials Effective Dates Name Provider Type Discipline     08/02/16 -  Caren Nation PTA Physical Therapy Assistant PT    CW 06/22/15 -  Mary Sylvester PTA Physical Therapy Assistant PT    EL 06/09/17 -  Alissa Armendariz RNA Registered Nurse Nurse     10/08/18 -  Andrea Roe PT Student PT Student PT                PT Recommendation and Plan        Outcome Measures     Row Name 11/10/18 1000 11/09/18 0900 11/08/18 1100       How much help from another person do you currently need...    Turning from your back to your side while in flat bed without using bedrails?  2  -AH  2  -CW  2  -CW    Moving from lying on back to sitting on the side of a flat bed without bedrails?  2   -AH  2  -CW  2  -CW    Moving to and from a bed to a chair (including a wheelchair)?  1  -AH  1  -CW  1  -CW    Standing up from a chair using your arms (e.g., wheelchair, bedside chair)?  1  -  1  -CW  1  -CW    Climbing 3-5 steps with a railing?  1  -  1  -CW  1  -CW    To walk in hospital room?  1  -AH  1  -CW  1  -CW    AM-PAC 6 Clicks Score  8  -AH  8  -CW  8  -CW       Functional Assessment    Outcome Measure Options  AM-PAC 6 Clicks Basic Mobility (PT)  -AH  AM-PAC 6 Clicks Basic Mobility (PT)  -CW  AM-PAC 6 Clicks Basic Mobility (PT)  -CW      User Key  (r) = Recorded By, (t) = Taken By, (c) = Cosigned By    Initials Name Provider Type    Caren Ellington, PTA Physical Therapy Assistant    Mary Escobar, PTA Physical Therapy Assistant         Time Calculation:   PT Charges     Row Name 11/10/18 1512 11/10/18 1058          Time Calculation    Start Time  1512  -AL  1013  -     Stop Time  1528  -AL  1043  -     Time Calculation (min)  16 min  -AL  30 min  -     PT Received On  11/10/18  -AL  11/10/18  -     PT Goal Re-Cert Due Date  11/14/18  -AL  11/14/18  -        Time Calculation- PT    Total Timed Code Minutes- PT  16 minute(s)  -AL  30 minute(s)  -        Timed Charges    51246 - PT Therapeutic Exercise Minutes  16  -AL  --     95974 - PT Therapeutic Activity Minutes  --  30  -AH       User Key  (r) = Recorded By, (t) = Taken By, (c) = Cosigned By    Initials Name Provider Type    Caren Ellington, PTA Physical Therapy Assistant    Caren Mata, DELMA Physical Therapy Assistant        Therapy Suggested Charges     Code   Minutes Charges    42787 (CPT®) Hc Pt Neuromusc Re Education Ea 15 Min      36203 (CPT®) Hc Pt Ther Proc Ea 15 Min 16 1    77425 (CPT®) Hc Gait Training Ea 15 Min      35244 (CPT®) Hc Pt Therapeutic Act Ea 15 Min      84998 (CPT®) Hc Pt Manual Therapy Ea 15 Min      38425 (CPT®) Hc Pt Iontophoresis Ea 15 Min      72578 (CPT®) Hc Pt Elec Stim Ea-Per 15 Min       91490 (CPT®) Hc Pt Ultrasound Ea 15 Min      93476 (CPT®) Hc Pt Self Care/Mgmt/Train Ea 15 Min      60710 (CPT®) Hc Pt Prosthetic (S) Train Initial Encounter, Each 15 Min      48369 (CPT®) Hc Pt Orthotic(S)/Prosthetic(S) Encounter, Each 15 Min      89235 (CPT®) Hc Orthotic(S) Mgmt/Train Initial Encounter, Each 15min      Total  16 1        Therapy Charges for Today     Code Description Service Date Service Provider Modifiers Qty    15254219458 HC PT THER PROC EA 15 MIN 11/10/2018 Caren Post, PTA GP 1          PT G-Codes  Outcome Measure Options: AM-PAC 6 Clicks Basic Mobility (PT)  AM-PAC 6 Clicks Score: 8  Functional Limitation: Mobility: Walking and moving around  Mobility: Walking and Moving Around Current Status (): At least 60 percent but less than 80 percent impaired, limited or restricted  Mobility: Walking and Moving Around Goal Status (): At least 40 percent but less than 60 percent impaired, limited or restricted    Caren Post, PTA  11/10/2018

## 2018-11-10 NOTE — PLAN OF CARE
Problem: Patient Care Overview  Goal: Plan of Care Review  Outcome: Ongoing (interventions implemented as appropriate)   11/10/18 1051   Coping/Psychosocial   Plan of Care Reviewed With patient   Plan of Care Review   Progress improving   OTHER   Outcome Summary pt trans to EOB mod of 2, sat EOB performed BLE ROM, sit-stand with bed elevated mod of 2, pt was able to maintain TTWB LLE. Pt trans back to bed mod-max of 2 Pt would benefit from SNF

## 2018-11-10 NOTE — PROGRESS NOTES
[facility] Services  INPATIENT PROGRESS NOTE    Length of Stay: 8  Date of Admission: 11/1/2018  Primary Care Physician: Mg Colmenares MD    Subjective   Chief Complaint: left hip pain  HPI   Lying in bed.  Physical therapy working with patient.  He just stood at the bedside for 2 minutes with assistance of physical therapy.  He denies nausea, vomiting or abdominal pain.  Reports left hip discomfort.  Abdomen is soft, nondistended.  Had bowel movement 2 days ago.  Chest pain or palpitations.  No family members in room.    Review of Systems   Constitutional: Positive for activity change (After fall) and fatigue.   HENT: Negative for congestion and trouble swallowing.    Eyes: Negative for photophobia and visual disturbance.   Respiratory: Negative for cough, shortness of breath and wheezing.    Cardiovascular: Positive for leg swelling (Left hip). Negative for chest pain and palpitations.   Gastrointestinal: Positive for constipation (resolved). Negative for diarrhea, nausea and vomiting.   Endocrine: Negative for cold intolerance, heat intolerance and polyuria.   Genitourinary: Negative for dysuria and urgency.   Musculoskeletal: Positive for gait problem and myalgias.   Skin: Positive for wound (Left hip).   Allergic/Immunologic: Negative for immunocompromised state.   Neurological: Positive for weakness.   Hematological: Negative for adenopathy. Does not bruise/bleed easily.   Psychiatric/Behavioral: Positive for confusion.     All pertinent negatives and positives are as above. All other systems have been reviewed and are negative unless otherwise stated.     Objective    Temp:  [97.7 °F (36.5 °C)-98.2 °F (36.8 °C)] 97.7 °F (36.5 °C)  Heart Rate:  [] 86  Resp:  [16-18] 18  BP: (119-123)/(57-71) 119/57  Physical Exam  Constitutional: He appears well-developed and well-nourished.   Head: Normocephalic and atraumatic. Hard of hearing.  Ear wax noted both ear  canals.   Eyes: Pupils are equal, round, and reactive to light. EOM are normal. Eyeglasses in place.  Neck: Normal range of motion. Neck supple.   Cardiovascular: Normal rate, regular rhythm, normal heart sounds and intact distal pulses.  Exam reveals no gallop and no friction rub.  No murmur heard.  Pulmonary/Chest: Effort normal and breath sounds normal. No respiratory distress. He has no wheezes. He has no rales. No oxygen in use   Abdominal: Soft. Bowel sounds are normal. There is no distention or tenderness.   Genitourinary Comments: Incontinent.  Condom catheter in place.  Musculoskeletal: He exhibits edema (Left lower extremity) and tenderness (Left hip).   SCDs bilateral lower extremities   Neurological: Identifies self and follows commanfs   Skin: Skin is warm and dry. Staples intact left hip. Less redness at staple line. Left heel with 2 cm x 1.5 cm red area, right heel with 1 cm red area. No open areas on heels. Mepelix to heels. Prevalon boots in place.  Psychiatric: He has a normal mood and affect. His behavior is normal. Judgment and thought content normal.     Results Review:  I have reviewed the labs, radiology results, and diagnostic studies.    Laboratory Data:   Results from last 7 days   Lab Units  11/10/18   0425  11/08/18   0434  11/07/18   0823 11/06/18   0437  11/05/18   0710   WBC 10*3/mm3   --    --   12.06*  11.45*  13.44*   HEMOGLOBIN g/dL  9.2*  8.9*  9.0*  8.7*  8.8*   HEMATOCRIT %  28.7*  27.3*  28.1*  27.3*  26.5*   PLATELETS 10*3/mm3   --    --   434*  312  344        Results from last 7 days   Lab Units  11/07/18   0823 11/06/18   0437 11/05/18   0710   SODIUM mmol/L  138  138  137   POTASSIUM mmol/L  4.7  3.5  3.2*   CHLORIDE mmol/L  96*  97*  94*   CO2 mmol/L  31.0  33.0*  33.0*   BUN mg/dL  23*  28*  26*   CREATININE mg/dL  0.75  0.85  0.86   CALCIUM mg/dL  8.9  8.5  8.3*   GLUCOSE mg/dL  98  105*  102*        Imaging Results (all)     Procedure Component Value Units Date/Time     XR Femur 2 View Left [[id number]] Collected:  11/03/18 1254     Updated:  11/04/18 0939    Narrative:       EXAMINATION:   XR FEMUR 2 VW LEFT-  11/3/2018 12:54 PM CDT     HISTORY: Fractured left femur     9 images are obtained in the operating room.     Orthopedic pins present in the left femoral head and neck. Femoral nail  is present. Fracture fragments are relatively aligned. Cerclage wires  are present.     3 minutes 12 seconds of fluoroscopic time was used during this procedure  This report was finalized on 11/03/2018 12:56 by Dr. Donis Andrews MD.    FL C Arm During Surgery [[id number]] Collected:  11/03/18 1254     Updated:  11/04/18 0939    Narrative:       EXAMINATION:   XR FEMUR 2 VW LEFT-  11/3/2018 12:54 PM CDT     HISTORY: Fractured left femur     9 images are obtained in the operating room.     Orthopedic pins present in the left femoral head and neck. Femoral nail  is present. Fracture fragments are relatively aligned. Cerclage wires  are present.     3 minutes 12 seconds of fluoroscopic time was used during this procedure  This report was finalized on 11/03/2018 12:56 by Dr. Donis Andrews MD.    XR Abdomen KUB [[id number]] Collected:  11/02/18 0815     Updated:  11/02/18 0822    Narrative:       EXAMINATION: KUB 11/20/2018     HISTORY: Belly pain and leukocytosis     FINDINGS: KUB radiograph demonstrates mild constipation with a  nonspecific bowel gas pattern. There is no obstruction or free air.  There is arthritic change of the right hip and previous fixation for a  left hip fracture. A Singleton catheter is in place.       Impression:       . Mild constipation. There is a nonspecific bowel gas  pattern. There is no obstruction or free air.  This report was finalized on 11/02/2018 08:19 by Dr. Harvey Landry MD.    XR Femur 2 View Left [[id number]] Collected:  11/02/18 0710     Updated:  11/02/18 0718    Narrative:       LEFT FEMUR, 2 VIEWS 11/2/2018 12:12 AM CDT     HISTORY: fall, pain;  S72.352A-Displaced comminuted fracture of shaft of  left femur, initial encounter for closed fracture     COMPARISON: Intraoperative fluoroscopic images dated 10/27/2018     FINDINGS:     Frontal and lateral radiographs of the left femur were obtained.     TFN recently placed. New periprosthetic fracture with comminution of the  proximal diaphysis. Large displaced butterfly fragments are present and  there is foreshortening up to 7 cm. Distal femur appears intact.       Impression:       1. New comminuted periprosthetic fracture along the proximal diaphysis  of femur.  This report was finalized on 11/02/2018 07:15 by Dr Babak Franklin, .    XR Chest 1 View [[id number]] Collected:  11/02/18 0657     Updated:  11/02/18 0701    Narrative:       EXAMINATION:   XR CHEST 1 VW-  11/2/2018 6:57 AM CDT     HISTORY: Preop     Frontal upright radiograph of the chest 11/2/2018 1:31 AM CDT     COMPARISON: October 27, 2018.     FINDINGS:   The lungs are clear. The cardiac silhouette is normal. Vascular  calcifications present aortic arch..      The osseous structures and surrounding soft tissues demonstrate no acute  abnormality.       Impression:       1. No radiographic evidence of acute cardiopulmonary process.        This report was finalized on 11/02/2018 06:57 by Dr. Donis Andrews MD.        Intake/Output    Intake/Output Summary (Last 24 hours) at 11/10/2018 1025  Last data filed at 11/9/2018 1500  Gross per 24 hour   Intake --   Output 650 ml   Net -650 ml       Scheduled Meds    apixaban 2.5 mg Oral Q12H   bisacodyl 10 mg Rectal Daily   carbamide peroxide 10 drop Both Ears Nightly   famotidine 20 mg Oral Daily   hydrochlorothiazide 25 mg Oral Daily   polyethylene glycol 17 g Oral Daily   potassium chloride 40 mEq Oral Daily   QUEtiapine 50 mg Oral Nightly   sodium chloride 3 mL Intravenous Q12H       I have reviewed the patient current medications.     Assessment/Plan     Assessment:  1.  Acute traumatic comminuted  periprosthetic fracture along the proximal diaphysis of the femur with removal of pre-existing trochanteric fixation nail  and placement of long trochanteric fixation nail left femur and cable of femoral shaft fracture 11/3/18  2.  Frequent falls  3.  Recent left intertrochanteric hip fracture with trochanteric fixation nailing on 10/27/18  4.  Acute postop blood loss anemia   5.  Acutely anticoagulated with Eliquis for deep vein thrombosis prophylaxis secondary to #3  6.  Constipation, chronic  7.  Mild protein malnutrition   8.  Essential hypertension   9.  Leukocytosis, suspected reactive  10.  Hypokalemia, resolved  11.  Ceruminosis bilaterally     Plan:  1.  Removal of pre-existing trochanteric fixation nail and placement of long trochanteric fixation nail left femur and cabling of femoral shaft fracture 11/3/18  2.  Physical therapy occupational therapy slowly per orthopedics. Stood at bedside for 2 minutes with physical therapy today.  3.  Transfused 4 units PRBC and 4 units FFP total since admission   4.  SCDs and Eliquis for deep vein thrombosis prophylaxis  5.  Debros ear drops bilaterally for ear wax started 11/9  6.  H/H 9.2/28.7, stable  7.  Dr. Colon would like patient to remain in the hospital through the weekend to monitor operative site.  8.   for discharge planning.  Insurance approved [facility].    indicates that precertification will have to be restarted if patient remains in hospital over the weekend.  9.  H/H and basic metabolic panel tomorrow.     The above documentation resulted from a face-to-face encounter by me Alejandra RUCKER, [facility].      Discharge Planning: I expect patient to be discharged to [facility] when stable for discharge per Dr. Colon and insurance approves.    ALKA Abdul   11/10/18   10:25 AM

## 2018-11-11 LAB
ANION GAP SERPL CALCULATED.3IONS-SCNC: 9 MMOL/L (ref 4–13)
BUN BLD-MCNC: 20 MG/DL (ref 5–21)
BUN/CREAT SERPL: 25 (ref 7–25)
CALCIUM SPEC-SCNC: 8.7 MG/DL (ref 8.4–10.4)
CHLORIDE SERPL-SCNC: 97 MMOL/L (ref 98–110)
CO2 SERPL-SCNC: 31 MMOL/L (ref 24–31)
CREAT BLD-MCNC: 0.8 MG/DL (ref 0.5–1.4)
GFR SERPL CREATININE-BSD FRML MDRD: 91 ML/MIN/1.73
GLUCOSE BLD-MCNC: 93 MG/DL (ref 70–100)
HCT VFR BLD AUTO: 29.5 % (ref 40–52)
HGB BLD-MCNC: 9.3 G/DL (ref 14–18)
POTASSIUM BLD-SCNC: 4.7 MMOL/L (ref 3.5–5.3)
SODIUM BLD-SCNC: 137 MMOL/L (ref 135–145)

## 2018-11-11 PROCEDURE — 97530 THERAPEUTIC ACTIVITIES: CPT

## 2018-11-11 PROCEDURE — 85018 HEMOGLOBIN: CPT | Performed by: NURSE PRACTITIONER

## 2018-11-11 PROCEDURE — 97110 THERAPEUTIC EXERCISES: CPT

## 2018-11-11 PROCEDURE — 80048 BASIC METABOLIC PNL TOTAL CA: CPT | Performed by: NURSE PRACTITIONER

## 2018-11-11 PROCEDURE — 85014 HEMATOCRIT: CPT | Performed by: NURSE PRACTITIONER

## 2018-11-11 RX ADMIN — POLYETHYLENE GLYCOL 3350 17 G: 17 POWDER, FOR SOLUTION ORAL at 08:35

## 2018-11-11 RX ADMIN — HYDROCHLOROTHIAZIDE 25 MG: 25 TABLET ORAL at 08:35

## 2018-11-11 RX ADMIN — QUETIAPINE FUMARATE 50 MG: 25 TABLET, FILM COATED ORAL at 21:23

## 2018-11-11 RX ADMIN — HYDROCODONE BITARTRATE AND ACETAMINOPHEN 1 TABLET: 5; 325 TABLET ORAL at 06:08

## 2018-11-11 RX ADMIN — APIXABAN 2.5 MG: 2.5 TABLET, FILM COATED ORAL at 06:08

## 2018-11-11 RX ADMIN — CARBAMIDE PEROXIDE 6.5% 10 DROP: 6.5 LIQUID AURICULAR (OTIC) at 21:23

## 2018-11-11 RX ADMIN — HYDROCODONE BITARTRATE AND ACETAMINOPHEN 1 TABLET: 5; 325 TABLET ORAL at 11:53

## 2018-11-11 RX ADMIN — APIXABAN 2.5 MG: 2.5 TABLET, FILM COATED ORAL at 17:23

## 2018-11-11 RX ADMIN — POTASSIUM CHLORIDE 40 MEQ: 750 CAPSULE, EXTENDED RELEASE ORAL at 08:35

## 2018-11-11 RX ADMIN — FAMOTIDINE 20 MG: 20 TABLET, FILM COATED ORAL at 08:35

## 2018-11-11 NOTE — PROGRESS NOTES
[facility] Medicine Services  INPATIENT PROGRESS NOTE    Length of Stay: 9  Date of Admission: 11/1/2018  Primary Care Physician: Mg Colmenares MD    Subjective   Chief Complaint: left hip pain  HPI   Lying in bed.  Daughter in room.  Patient sleeping, awakened  for assessment.  He was able to stand at the bedside with physical therapy yesterday.  He denies nausea, vomiting or abdominal pain.  Abdomen soft.  Had bowel movement 2 days ago.  Daughter reports hearing has improved.  Reports left hip discomfort with movement.    Review of Systems   Constitutional: Positive for activity change (After fall) and fatigue.   HENT: Negative for congestion and trouble swallowing.    Eyes: Negative for photophobia and visual disturbance.   Respiratory: Negative for cough, shortness of breath and wheezing.    Cardiovascular: Positive for leg swelling (Left hip). Negative for chest pain and palpitations.   Gastrointestinal: Positive for constipation (resolved). Negative for diarrhea, nausea and vomiting.   Endocrine: Negative for cold intolerance, heat intolerance and polyuria.   Genitourinary: Negative for dysuria and urgency.   Musculoskeletal: Positive for gait problem and myalgias.   Skin: Positive for wound (Left hip).   Allergic/Immunologic: Negative for immunocompromised state.   Neurological: Positive for weakness.   Hematological: Negative for adenopathy. Does not bruise/bleed easily.   Psychiatric/Behavioral: Positive for confusion.     All pertinent negatives and positives are as above. All other systems have been reviewed and are negative unless otherwise stated.     Objective    Temp:  [98.1 °F (36.7 °C)-98.2 °F (36.8 °C)] 98.2 °F (36.8 °C)  Heart Rate:  [89-94] 89  Resp:  [18] 18  BP: (128-129)/(58-73) 129/58  Physical Exam  Constitutional: He appears well-developed and well-nourished.   Head: Normocephalic and atraumatic. Hard of hearing.  Ear wax noted both ear  canals.   Eyes: Pupils are equal, round, and reactive to light. EOM are normal. Eyeglasses in place.  Neck: Normal range of motion. Neck supple.   Cardiovascular: Normal rate, regular rhythm, normal heart sounds and intact distal pulses.  Exam reveals no gallop and no friction rub.  No murmur heard.  Pulmonary/Chest: Effort normal and breath sounds normal. No respiratory distress. He has no wheezes. He has no rales. No oxygen in use   Abdominal: Soft. Bowel sounds are normal. There is no distention or tenderness.   Genitourinary Comments: Incontinent.  Condom catheter.   Musculoskeletal: He exhibits edema (Left lower extremity, improving) and tenderness (Left hip).   SCDs bilateral lower extremities   Neurological: Identifies self and follows commands  Skin: Skin is warm and dry. Staples intact left hip. Less redness at staple line. Left heel with 2 cm x 1.5 cm red area, right heel with 1 cm red area. No open areas on heels. Mepelix to heels. Prevalon boots in place.  Psychiatric: He has a normal mood and affect. His behavior is normal. Judgment and thought content normal.      Results Review:  I have reviewed the labs, radiology results, and diagnostic studies.    Laboratory Data:   Results from last 7 days   Lab Units  11/11/18   0428  11/10/18   0425  11/08/18   0434  11/07/18   0823  11/06/18   0437 11/05/18   0710   WBC 10*3/mm3   --    --    --   12.06*  11.45*  13.44*   HEMOGLOBIN g/dL  9.3*  9.2*  8.9*  9.0*  8.7*  8.8*   HEMATOCRIT %  29.5*  28.7*  27.3*  28.1*  27.3*  26.5*   PLATELETS 10*3/mm3   --    --    --   434*  312  344        Results from last 7 days   Lab Units  11/11/18 0428 11/07/18 0823 11/06/18 0437   SODIUM mmol/L  137  138  138   POTASSIUM mmol/L  4.7  4.7  3.5   CHLORIDE mmol/L  97*  96*  97*   CO2 mmol/L  31.0  31.0  33.0*   BUN mg/dL  20  23*  28*   CREATININE mg/dL  0.80  0.75  0.85   CALCIUM mg/dL  8.7  8.9  8.5   GLUCOSE mg/dL  93  98  105*     Imaging Results (all)      Procedure Component Value Units Date/Time    XR Femur 2 View Left [[id number]] Collected:  11/03/18 1254     Updated:  11/04/18 0939    Narrative:       EXAMINATION:   XR FEMUR 2 VW LEFT-  11/3/2018 12:54 PM CDT     HISTORY: Fractured left femur     9 images are obtained in the operating room.     Orthopedic pins present in the left femoral head and neck. Femoral nail  is present. Fracture fragments are relatively aligned. Cerclage wires  are present.     3 minutes 12 seconds of fluoroscopic time was used during this procedure  This report was finalized on 11/03/2018 12:56 by Dr. Donis Andrews MD.    FL C Arm During Surgery [[id number]] Collected:  11/03/18 1254     Updated:  11/04/18 0939    Narrative:       EXAMINATION:   XR FEMUR 2 VW LEFT-  11/3/2018 12:54 PM CDT     HISTORY: Fractured left femur     9 images are obtained in the operating room.     Orthopedic pins present in the left femoral head and neck. Femoral nail  is present. Fracture fragments are relatively aligned. Cerclage wires  are present.     3 minutes 12 seconds of fluoroscopic time was used during this procedure  This report was finalized on 11/03/2018 12:56 by Dr. Donis Andrews MD.    XR Abdomen KUB [[id number]] Collected:  11/02/18 0815     Updated:  11/02/18 0822    Narrative:       EXAMINATION: KUB 11/20/2018     HISTORY: Belly pain and leukocytosis     FINDINGS: KUB radiograph demonstrates mild constipation with a  nonspecific bowel gas pattern. There is no obstruction or free air.  There is arthritic change of the right hip and previous fixation for a  left hip fracture. A Singleton catheter is in place.       Impression:       . Mild constipation. There is a nonspecific bowel gas  pattern. There is no obstruction or free air.  This report was finalized on 11/02/2018 08:19 by Dr. Harvey Landry MD.    XR Femur 2 View Left [[id number]] Collected:  11/02/18 0710     Updated:  11/02/18 0718    Narrative:       LEFT FEMUR, 2 VIEWS 11/2/2018 12:12  AM CDT     HISTORY: fall, pain; S72.352A-Displaced comminuted fracture of shaft of  left femur, initial encounter for closed fracture     COMPARISON: Intraoperative fluoroscopic images dated 10/27/2018     FINDINGS:     Frontal and lateral radiographs of the left femur were obtained.     TFN recently placed. New periprosthetic fracture with comminution of the  proximal diaphysis. Large displaced butterfly fragments are present and  there is foreshortening up to 7 cm. Distal femur appears intact.       Impression:       1. New comminuted periprosthetic fracture along the proximal diaphysis  of femur.  This report was finalized on 11/02/2018 07:15 by Dr Babak Franklin, .    XR Chest 1 View [[id number]] Collected:  11/02/18 0657     Updated:  11/02/18 0701    Narrative:       EXAMINATION:   XR CHEST 1 VW-  11/2/2018 6:57 AM CDT     HISTORY: Preop     Frontal upright radiograph of the chest 11/2/2018 1:31 AM CDT     COMPARISON: October 27, 2018.     FINDINGS:   The lungs are clear. The cardiac silhouette is normal. Vascular  calcifications present aortic arch..      The osseous structures and surrounding soft tissues demonstrate no acute  abnormality.       Impression:       1. No radiographic evidence of acute cardiopulmonary process.        This report was finalized on 11/02/2018 06:57 by Dr. Donis Andrews MD.          Intake/Output    Intake/Output Summary (Last 24 hours) at 11/11/2018 1139  Last data filed at 11/10/2018 2013  Gross per 24 hour   Intake 360 ml   Output --   Net 360 ml       Scheduled Meds    apixaban 2.5 mg Oral Q12H   bisacodyl 10 mg Rectal Daily   carbamide peroxide 10 drop Both Ears Nightly   famotidine 20 mg Oral Daily   hydrochlorothiazide 25 mg Oral Daily   polyethylene glycol 17 g Oral Daily   potassium chloride 40 mEq Oral Daily   QUEtiapine 50 mg Oral Nightly   sodium chloride 3 mL Intravenous Q12H       I have reviewed the patient current medications.     Assessment/Plan     Assessment:  1.   Acute traumatic comminuted periprosthetic fracture along the proximal diaphysis of the femur with removal of pre-existing trochanteric fixation nail  and placement of long trochanteric fixation nail left femur and cable of femoral shaft fracture 11/3/18  2.  Frequent falls  3.  Recent left intertrochanteric hip fracture with trochanteric fixation nailing on 10/27/18  4.  Acute postop blood loss anemia, required transfusion  5.  Acutely anticoagulated with Eliquis for deep vein thrombosis prophylaxis secondary to #3  6.  Constipation, chronic  7.  Mild protein malnutrition   8.  Essential hypertension   9.  Leukocytosis, suspected reactive  10.  Hypokalemia, resolved  11.  Ceruminosis bilaterally     Plan:  1.  Removal of pre-existing trochanteric fixation nail and placement of long trochanteric fixation nail left femur and cabling of femoral shaft fracture 11/3/18  2.  Physical therapy occupational therapy slowly per orthopedics. Stood at bedside for 2 minutes with physical therapy yesterday. Physical therapy to see today.  3.  Transfused 4 units PRBC and 4 units FFP total since admission   4.  Eliquis and SCDs for deep vein thrombosis prophylaxis  5.  Debros ear drops bilaterally for ear wax started 11/9  6.  H/H 9.3/28.5, on 11/11 stable  7.  Dr. Colon would like patient to remain in the hospital through the weekend to monitor operative site. Less erythema today.  Edema resolving.  Staples dry, clean, intact. No signs of bleeding.  8.   for discharge planning.  Insurance approved [facility].    noted precertification will have to be restarted as patient remaining  in hospital over the weekend.  9.  Home medications reviewed.     The above documentation resulted from a face-to-face encounter by lashon RUCKER, [facility].        Discharge Planning: I expect patient to be discharged to [facility] when stable for discharge per   Isaiah and insurance approves.        Alejandra Martinez, APRN   11/11/18   11:39 AM

## 2018-11-11 NOTE — PLAN OF CARE
Problem: Patient Care Overview  Goal: Plan of Care Review  Outcome: Ongoing (interventions implemented as appropriate)   11/11/18 0803   Coping/Psychosocial   Plan of Care Reviewed With patient   Plan of Care Review   Progress improving   OTHER   Outcome Summary pt trans to EOB mod of 2, sit-stand mod of 2 able to maintain TTWB LLE, stood x 2 at BS, performed BLE ROM sitting EOB. trans back to bed mod-max of 2. Pt would benefit from SNF

## 2018-11-11 NOTE — PLAN OF CARE
Problem: Patient Care Overview  Goal: Plan of Care Review  Outcome: Ongoing (interventions implemented as appropriate)   11/11/18 0335   Coping/Psychosocial   Plan of Care Reviewed With patient;family   Plan of Care Review   Progress no change   OTHER   Outcome Summary pt did c/o pain in left hip; pain med given with relief reported; pt rested well through night; VSS; safety maintained; will continue to monitor     Goal: Discharge Needs Assessment  Outcome: Ongoing (interventions implemented as appropriate)    Goal: Interprofessional Rounds/Family Conf  Outcome: Ongoing (interventions implemented as appropriate)   11/11/18 0335   Interdisciplinary Rounds/Family Conf   Participants nursing;patient;family       Problem: Fall Risk (Adult)  Goal: Absence of Fall  Outcome: Ongoing (interventions implemented as appropriate)   11/11/18 0335   Fall Risk (Adult)   Absence of Fall making progress toward outcome       Problem: Nutrition, Imbalanced: Inadequate Oral Intake (Adult)  Goal: Improved Oral Intake  Outcome: Ongoing (interventions implemented as appropriate)   11/11/18 0335   Nutrition, Imbalanced: Inadequate Oral Intake (Adult)   Improved Oral Intake making progress toward outcome     Goal: Prevent Further Weight Loss  Outcome: Ongoing (interventions implemented as appropriate)   11/11/18 0335   Nutrition, Imbalanced: Inadequate Oral Intake (Adult)   Prevent Further Weight Loss making progress toward outcome       Problem: Hip Arthroplasty (Total, Partial) (Adult)  Goal: Signs and Symptoms of Listed Potential Problems Will be Absent, Minimized or Managed (Hip Arthroplasty)   11/11/18 0335   Goal/Outcome Evaluation   Problems Assessed (Hip Arthroplasty) all   Problems Present (Hip Arthroplasty) functional deficit;pain

## 2018-11-11 NOTE — THERAPY TREATMENT NOTE
Acute Care - Physical Therapy Treatment Note  [facility]     Patient Name: Burke Reinoso Jr.  : 1930  MRN: [id number]  Today's Date: 2018  Onset of Illness/Injury or Date of Surgery: 18  Date of Referral to PT: 18  Referring Physician: Dr. Colon    Admit Date: 2018    Visit Dx:    ICD-10-CM ICD-9-CM   1. Closed displaced comminuted fracture of shaft of left femur, initial encounter (CMS/[facility]) S72.352A 821.01   2. Dysphagia, unspecified type R13.10 787.20   3. Impaired mobility Z74.09 799.89     Patient Active Problem List   Diagnosis   • Closed displaced intertrochanteric fracture of left femur (CMS/[facility])   • Closed displaced comminuted fracture of shaft of left femur (CMS/[facility])       Therapy Treatment    Rehabilitation Treatment Summary     Row Name 18 0730          Treatment Time/Intention    Discipline  physical therapy assistant  -  physical therapy assistant  -AL     Document Type  therapy note (daily note)  -2  therapy note (daily note)  -AL     Subjective Information  no complaints  -2  complains of;pain  -     Mode of Treatment  --  physical therapy  -AL     Existing Precautions/Restrictions  fall;left;weight bearing TTWB LLE, progress slowly  -AH3  fall;left;weight bearing TTWB LLE, progress slowly  -     Treatment Considerations/Comments  [place]  -AH3  [place]  -AH     Recorded by [AH] Caren Nation, PTA 18 1338  [AH2] Caren Nation, PTA 18 1355  [AH3] Caren Nation, PTA 18 1401 [AH] Caren Nation, PTA 18 0803  [AL] Caren Post, PTA 18 0731     Row Name 188 18 0730          Mobility Assessment/Intervention    Extremity Weight-bearing Status  left lower extremity  (Significant)   -  left lower extremity  (Significant)   -     Left Lower Extremity (Weight-bearing Status)  toe touch weight-bearing (TTWB)  (Significant)   -  toe touch weight-bearing (TTWB)  (Significant)   -     Recorded by  [] Caren Nation, [facility] 11/11/18 1401 [] Caren Nation, [facility] 11/11/18 0803     Row Name 11/11/18 0730             Bed Mobility Assessment/Treatment    Supine-Sit [place] (Bed Mobility)  minimum assist (75% patient effort);moderate assist (50% patient effort);2 person assist;verbal cues  -      Sit-Supine [place] (Bed Mobility)  moderate assist (50% patient effort);maximum assist (25% patient effort);2 person assist;verbal cues  -      Bed Mobility, Safety Issues  decreased use of arms for pushing/pulling;decreased use of legs for bridging/pushing  -      Recorded by [] Caren Nation, PTA 11/11/18 0803      Row Name 11/11/18 0730             Transfer Assessment/Treatment    Maintains Weight-bearing Status (Transfers)  able to maintain;verbal cues to maintain;nonverbal cues (demo/gesture) to maintain  -      Comment (Transfers)  stood x 2 at BS  -      Recorded by [] Caren Nation, [facility] 11/11/18 0803      Row Name 11/11/18 0730             Sit-Stand Transfer    Sit-Stand [place] (Transfers)  moderate assist (50% patient effort);2 person assist;verbal cues  -      Assistive Device (Sit-Stand Transfers)  walker, front-wheeled  -      Recorded by [] Caren Nation, PTA 11/11/18 0803      Row Name 11/11/18 0730             Stand-Sit Transfer    Stand-Sit [place] (Transfers)  minimum assist (75% patient effort);moderate assist (50% patient effort);2 person assist;verbal cues  -      Recorded by [] Caren Nation, PTA 11/11/18 0803      Row Name 11/11/18 1338             Lower Extremity Supine Therapeutic Exercise    Performed, Supine Lower Extremity (Therapeutic Exercise)  hip abduction/adduction;ankle dorsiflexion/plantarflexion;SAQ (short arc quad) over bolster;quadriceps sets;ankle pumps;heel slides  -      Exercise Type, Supine Lower Extremity (Therapeutic Exercise)  AAROM (active assistive range of motion)  -      Sets/Reps Detail, Supine Lower Extremity  (Therapeutic Exercise)  10 X 2  -AH      Comment, Supine Lower Extremity (Therapeutic Exercise)  BLE  -AH      Recorded by [] Caren Nation, PTA 11/11/18 1401      Row Name 11/11/18 0730             Therapeutic Exercise    Lower Extremity (Therapeutic Exercise)  LAQ (long arc quad), bilateral;marching while seated  -AH      Recorded by [] Caren Nation, PTA 11/11/18 0803      Row Name 11/11/18 1338 11/11/18 0730          Positioning and Restraints    Pre-Treatment Position  in bed  -AH  in bed  -AH     Post Treatment Position  bed  -AH  bed  -AH     In Bed  fowlers;call light within reach;encouraged to call for assist;exit alarm on;with family/caregiver;with nsg;SCD pump applied;waffle boots/both  -AH  side lying right;fowlers;sitting EOB;call light within reach;encouraged to call for assist;exit alarm on;SCD pump applied;pillow between legs;waffle boots/both  -AH     Recorded by [] Caren Nation, PTA 11/11/18 1401 [] Caren Nation, PTA 11/11/18 0803     Row Name 11/11/18 1338             Pain Assessment    Additional Documentation  Pain Scale: FACES Pre/Post-Treatment (Group)  -AH      Recorded by [] Caren Nation, PTA 11/11/18 1401      Row Name 11/11/18 0730             Pain Scale: Numbers Pre/Post-Treatment    Pain Location - Side  Left  -AH      Pain Location - Orientation  lower  -AH      Pain Location  extremity  -AH      Pain Intervention(s)  Medication (See MAR);Repositioned  -AH      Recorded by [] Caren Nation, PTA 11/11/18 0803      Row Name 11/11/18 1338 11/11/18 0730          Pain Scale: FACES Pre/Post-Treatment    Pain: FACES Scale, Pretreatment  0-->no hurt  -AH  4-->hurts little more  -AH     Recorded by [] Caren Nation, PTA 11/11/18 1401 [] Caren Nation, PTA 11/11/18 0803     Row Name                Wound 10/27/18 1052 Left hip incision    Wound - Properties Group Date first assessed: 10/27/18 [AD] Time first assessed: 1052 [AD] Side: Left [AD] Location: hip [AD]  Type: incision [AD] Recorded by:  [AD] Fernando Rajput RN 10/27/18 1052    Row Name                Wound 11/03/18 1237 Left leg incision    Wound - Properties Group Date first assessed: 11/03/18 [LC] Time first assessed: 1237 [LC] Side: Left [LC] Location: leg [LC] Type: incision [LC] Recorded by:  [LC] Luz Medina RN 11/03/18 1237    Row Name                Wound 11/05/18 0952 Left heel blisters    Wound - Properties Group Date first assessed: 11/05/18 [AW] Time first assessed: 0952 [AW] Present On Admission : picture taken;no [AW] Side: Left [AW] Location: heel [AW] Type: blisters [AW] Recorded by:  [AW] Jaclyn Martinez RN 11/05/18 1520    Row Name                Wound 11/07/18 0439 Left lateral heel blisters    Wound - Properties Group Date first assessed: 11/07/18 [EL] Time first assessed: 0439 [EL] Present On Admission : no;picture taken [EL] Side: Left [EL] Orientation: lateral [EL] Location: heel [EL] Type: blisters [EL] Recorded by:  [EL] Alissa Armendariz RNA 11/07/18 0440    Row Name                Wound 11/07/18 0440 Left lateral foot blisters    Wound - Properties Group Date first assessed: 11/07/18 [EL] Time first assessed: 0440 [EL] Present On Admission : no;picture taken [EL] Side: Left [EL] Orientation: lateral [EL] Location: foot [EL] Type: blisters [EL] Recorded by:  [EL] Alissa Armendariz RNA 11/07/18 0440    Row Name                Wound 11/07/18 0700 Right heel blisters    Wound - Properties Group Date first assessed: 11/07/18 [DB] Time first assessed: 0700 [DB] Present On Admission : no;picture taken [DB] Side: Right [DB] Location: heel [DB] Type: blisters [DB2] Stage, Pressure Injury: -- [DB2] Recorded by:  [DB] Carley White RNA 11/07/18 1840 [DB2] Carley White RNA 11/07/18 1841      User Key  (r) = Recorded By, (t) = Taken By, (c) = Cosigned By    Initials Name Effective Dates Discipline     Caren Nation, PTA 08/02/16 -  PT    Caren Mata, PTA  08/02/16 -  PT    Carley Aparicio, RNA 06/02/17 -  Nurse    NENA Medina, Luz LEVINE, RN 08/02/16 -  Nurse    Alissa Holt, RNA 06/09/17 -  Nurse    ALEX Rajput, Fernando GROVE, RN 08/02/17 -  Nurse    Jaclyn Pineda, RN 01/12/18 -  Nurse          Wound 10/27/18 1052 Left hip incision (Active)   Dressing Appearance intact;no drainage;dry 11/10/2018  8:05 PM   Closure None 11/11/2018  7:47 AM   Dressing Care, Wound gauze;low-adherent 11/11/2018  7:47 AM       Wound 11/03/18 1237 Left leg incision (Active)   Dressing Appearance dry;intact;no drainage 11/11/2018  7:47 AM   Closure [place] 11/11/2018  7:47 AM   Base dressing in place, unable to visualize 11/11/2018  7:47 AM   Periwound dry;intact 11/10/2018  8:05 PM   Drainage Amount none 11/11/2018  7:47 AM   Dressing Care, Wound gauze;low-adherent 11/11/2018  7:47 AM       Wound 11/05/18 0952 Left heel blisters (Active)   Dressing Appearance dry;intact 11/10/2018  8:05 PM   Periwound redness 11/10/2018  8:05 PM   Drainage Amount none 11/10/2018  8:05 PM       Wound 11/07/18 0439 Left lateral heel blisters (Active)   Dressing Appearance dry;intact 11/11/2018  7:47 AM   Drainage Amount none 11/11/2018  7:47 AM   Dressing Care, Wound foam 11/11/2018  7:47 AM       Wound 11/07/18 0440 Left lateral foot blisters (Active)   Dressing Appearance dry;intact 11/11/2018  7:47 AM   Drainage Amount none 11/11/2018  7:47 AM   Dressing Care, Wound foam 11/11/2018  7:47 AM       Wound 11/07/18 0700 Right heel blisters (Active)   Dressing Appearance dry;intact 11/11/2018  7:47 AM   Dressing Care, Wound foam 11/11/2018  7:47 AM           Physical Therapy Education     Title: PT OT SLP Therapies (Active)     Topic: Physical Therapy (Active)     Point: Mobility training (Active)     Learning Progress Summary           Patient Acceptance, E,D, NR by CW at 11/8/2018 11:15 AM    Comment:  bed mobility, benefit of activity, plan of care    Acceptance, E, VU by CB at 11/4/2018  8:17  AM    Comment:  Pt educated on use of bed rails to assist with rolling.                   Point: Home exercise program (Active)     Learning Progress Summary           Patient Acceptance, E,D, NR by CW at 11/9/2018  9:15 AM    Comment:  HEP, benefits of activity, plan of care    Acceptance, E,D, NR by CW at 11/8/2018 11:15 AM    Comment:  bed mobility, benefit of activity, plan of care    Acceptance, E,D, NR by CW at 11/5/2018 11:34 AM    Comment:  benefit of exercise, plan of caare   Family Acceptance, E,D, NR by CW at 11/9/2018  9:15 AM    Comment:  HEP, benefits of activity, plan of care    Acceptance, E,D, NR by CW at 11/5/2018 11:34 AM    Comment:  benefit of exercise, plan of caare                   Point: Body mechanics (Active)     Learning Progress Summary           Patient Acceptance, E,D, NR by CW at 11/9/2018  9:15 AM    Comment:  HEP, benefits of activity, plan of care    Acceptance, E,D, NR by CW at 11/8/2018 11:15 AM    Comment:  bed mobility, benefit of activity, plan of care   Family Acceptance, E,D, NR by CW at 11/9/2018  9:15 AM    Comment:  HEP, benefits of activity, plan of care                   Point: Precautions (Done)     Learning Progress Summary           Patient Acceptance, E,TB,D, VU,DU by  at 11/11/2018  8:03 AM    Comment:  TTWB LLE    Acceptance, E,TB,D, VU,NR by  at 11/10/2018 10:49 AM    Comment:  TTWB LLE    Acceptance, E, NR by  at 11/7/2018  5:39 AM   Family Acceptance, E,TB,D, VU,DU by  at 11/7/2018 11:09 AM    Comment:  off loading heels                               User Key     Initials Effective Dates Name Provider Type Discipline     08/02/16 -  Caren Nation PTA Physical Therapy Assistant PT     06/22/15 -  Mary Sylvester PTA Physical Therapy Assistant PT     06/09/17 -  Alissa Armendariz RNA Registered Nurse Nurse     10/08/18 -  Andrea Roe PT Student PT Student PT                PT Recommendation and Plan     Plan of Care Reviewed  With: patient  Progress: improving  Outcome Summary: pt trans to EOB mod of 2, sit-stand mod of 2 able to maintain TTWB LLE, stood x 2 at BS, performed BLE ROM sitting EOB. trans back to bed mod-max of 2. Pt would benefit from SNF  Outcome Measures     Row Name 11/11/18 0800 11/10/18 1000 11/09/18 0900       How much help from another person do you currently need...    Turning from your back to your side while in flat bed without using bedrails?  2  -AH  2  -AH  2  -CW    Moving from lying on back to sitting on the side of a flat bed without bedrails?  2  -  2  -  2  -CW    Moving to and from a bed to a chair (including a wheelchair)?  1  -  1  -  1  -CW    Standing up from a chair using your arms (e.g., wheelchair, bedside chair)?  1  -  1  -  1  -CW    Climbing 3-5 steps with a railing?  1  -  1  -  1  -CW    To walk in hospital room?  1  -  1  -  1  -CW    AM-PAC 6 Clicks Score  8  -AH  8  -  8  -CW       Functional Assessment    Outcome Measure Options  AM-PAC 6 Clicks Basic Mobility (PT)  -  AM-PAC 6 Clicks Basic Mobility (PT)  -  AM-PAC 6 Clicks Basic Mobility (PT)  -      User Key  (r) = Recorded By, (t) = Taken By, (c) = Cosigned By    Initials Name Provider Type     Caren Nation, PTA Physical Therapy Assistant     Mary Sylvester, DELMA Physical Therapy Assistant         Time Calculation:   PT Charges     Row Name 11/11/18 1401 11/11/18 0805          Time Calculation    Start Time  1338  -  0730  -     Stop Time  1401  -  0755  -     Time Calculation (min)  23 min  -  25 min  -     PT Received On  --  11/11/18  -     PT Goal Re-Cert Due Date  --  11/14/18  -        Time Calculation- PT    Total Timed Code Minutes- PT  23 minute(s)  -AH  25 minute(s)  -        Timed Charges    32846 - PT Therapeutic Exercise Minutes  23  -AH  --     60406 - PT Therapeutic Activity Minutes  --  25  -       User Key  (r) = Recorded By, (t) = Taken By, (c) = Cosigned By     Initials Name Provider Type     Caren Nation, DELMA Physical Therapy Assistant        Therapy Suggested Charges     Code   Minutes Charges    68396 (CPT®) Hc Pt Neuromusc Re Education Ea 15 Min      46779 (CPT®) Hc Pt Ther Proc Ea 15 Min 23 2    01406 (CPT®) Hc Gait Training Ea 15 Min      39030 (CPT®) Hc Pt Therapeutic Act Ea 15 Min      28448 (CPT®) Hc Pt Manual Therapy Ea 15 Min      56972 (CPT®) Hc Pt Iontophoresis Ea 15 Min      87018 (CPT®) Hc Pt Elec Stim Ea-Per 15 Min      82206 (CPT®) Hc Pt Ultrasound Ea 15 Min      83991 (CPT®) Hc Pt Self Care/Mgmt/Train Ea 15 Min      36891 (CPT®) Hc Pt Prosthetic (S) Train Initial Encounter, Each 15 Min      08003 (CPT®) Hc Pt Orthotic(S)/Prosthetic(S) Encounter, Each 15 Min      33054 (CPT®) Hc Orthotic(S) Mgmt/Train Initial Encounter, Each 15min      Total  23 2        Therapy Charges for Today     Code Description Service Date Service Provider Modifiers Qty    47159655307 HC PT THERAPEUTIC ACT EA 15 MIN 11/10/2018 Caren Nation, PTA GP 2    90537436367 HC PT THERAPEUTIC ACT EA 15 MIN 11/11/2018 Caren Nation, PTA GP 2    03146719002 HC PT THER PROC EA 15 MIN 11/11/2018 Caren Nation, PTA GP 2          PT G-Codes  Outcome Measure Options: AM-PAC 6 Clicks Basic Mobility (PT)  AM-PAC 6 Clicks Score: 8  Functional Limitation: Mobility: Walking and moving around  Mobility: Walking and Moving Around Current Status (): At least 60 percent but less than 80 percent impaired, limited or restricted  Mobility: Walking and Moving Around Goal Status (): At least 40 percent but less than 60 percent impaired, limited or restricted    Caren Nation PTA  11/11/2018

## 2018-11-11 NOTE — THERAPY TREATMENT NOTE
Acute Care - Physical Therapy Treatment Note  [facility]     Patient Name: Burke Reinoso Jr.  : 1930  MRN: [id number]  Today's Date: 2018  Onset of Illness/Injury or Date of Surgery: 18  Date of Referral to : 18  Referring Physician: Dr. Colon    Admit Date: 2018    Visit Dx:    ICD-10-CM ICD-9-CM   1. Closed displaced comminuted fracture of shaft of left femur, initial encounter (CMS/LTAC, located within [facility]) S72.352A 821.01   2. Dysphagia, unspecified type R13.10 787.20   3. Impaired mobility Z74.09 799.89     Patient Active Problem List   Diagnosis   • Closed displaced intertrochanteric fracture of left femur (CMS/LTAC, located within [facility])   • Closed displaced comminuted fracture of shaft of left femur (CMS/LTAC, located within [facility])       Therapy Treatment    Rehabilitation Treatment Summary     Row Name 18             Treatment Time/Intention    Discipline  physical therapy assistant  -AL      Document Type  therapy note (daily note)  -AL      Subjective Information  complains of;pain  -      Mode of Treatment  physical therapy  -AL      Existing Precautions/Restrictions  fall;left;weight bearing TTWB LLE, progress slowly  -      Treatment Considerations/Comments  [place]  -      Recorded by [] Caren Nation, PTA 18  [AL] Caren Post, PTA 18      Row Name 18             Mobility Assessment/Intervention    Extremity Weight-bearing Status  left lower extremity  (Significant)   -      Left Lower Extremity (Weight-bearing Status)  toe touch weight-bearing (TTWB)  (Significant)   -      Recorded by [] Caren Nation, PTA 18      Row Name 18             Bed Mobility Assessment/Treatment    Supine-Sit [place] (Bed Mobility)  minimum assist (75% patient effort);moderate assist (50% patient effort);2 person assist;verbal cues  -      Sit-Supine [place] (Bed Mobility)  moderate assist (50% patient effort);maximum assist (25% patient effort);2 person  assist;verbal cues  -      Bed Mobility, Safety Issues  decreased use of arms for pushing/pulling;decreased use of legs for bridging/pushing  -      Recorded by [] Caren Nation, PTA 11/11/18 0803      Row Name 11/11/18 0730             Transfer Assessment/Treatment    Maintains Weight-bearing Status (Transfers)  able to maintain;verbal cues to maintain;nonverbal cues (demo/gesture) to maintain  -      Comment (Transfers)  stood x 2 at BS  -      Recorded by [] Caren Nation, PTA 11/11/18 0803      Row Name 11/11/18 0730             Sit-Stand Transfer    Sit-Stand [place] (Transfers)  moderate assist (50% patient effort);2 person assist;verbal cues  -      Assistive Device (Sit-Stand Transfers)  walker, front-wheeled  -      Recorded by [] Caren Nation, PTA 11/11/18 0803      Row Name 11/11/18 0730             Stand-Sit Transfer    Stand-Sit [place] (Transfers)  minimum assist (75% patient effort);moderate assist (50% patient effort);2 person assist;verbal cues  -      Recorded by [] Caren Nation, PTA 11/11/18 0803      Row Name 11/11/18 0730             Therapeutic Exercise    Lower Extremity (Therapeutic Exercise)  LAQ (long arc quad), bilateral;marching while seated  -      Recorded by [] Caren Nation, PTA 11/11/18 0803      Row Name 11/11/18 0730             Positioning and Restraints    Pre-Treatment Position  in bed  -      Post Treatment Position  bed  -      In Bed  side lying right;fowlers;sitting EOB;call light within reach;encouraged to call for assist;exit alarm on;SCD pump applied;pillow between legs;waffle boots/both  -      Recorded by [] Caren Nation, PTA 11/11/18 0803      Row Name 11/11/18 0730             Pain Scale: Numbers Pre/Post-Treatment    Pain Location - Side  Left  -AH      Pain Location - Orientation  lower  -      Pain Location  extremity  -      Pain Intervention(s)  Medication (See MAR);Repositioned  -      Recorded by  [AH] Caren Nation, PTA 11/11/18 0803      Row Name 11/11/18 0730             Pain Scale: FACES Pre/Post-Treatment    Pain: FACES Scale, Pretreatment  4-->hurts little more  -      Recorded by [AH] Caren Nation, PTA 11/11/18 0803      Row Name                Wound 10/27/18 1052 Left hip incision    Wound - Properties Group Date first assessed: 10/27/18 [AD] Time first assessed: 1052 [AD] Side: Left [AD] Location: hip [AD] Type: incision [AD] Recorded by:  [AD] Fernando Rajput RN 10/27/18 1052    Row Name                Wound 11/03/18 1237 Left leg incision    Wound - Properties Group Date first assessed: 11/03/18 [LC] Time first assessed: 1237 [LC] Side: Left [LC] Location: leg [LC] Type: incision [LC] Recorded by:  [LC] Luz Medina RN 11/03/18 1237    Row Name                Wound 11/05/18 0952 Left heel blisters    Wound - Properties Group Date first assessed: 11/05/18 [AW] Time first assessed: 0952 [AW] Present On Admission : picture taken;no [AW] Side: Left [AW] Location: heel [AW] Type: blisters [AW] Recorded by:  [AW] Jaclyn Martinez RN 11/05/18 1520    Row Name                Wound 11/07/18 0439 Left lateral heel blisters    Wound - Properties Group Date first assessed: 11/07/18 [EL] Time first assessed: 0439 [EL] Present On Admission : no;picture taken [EL] Side: Left [EL] Orientation: lateral [EL] Location: heel [EL] Type: blisters [EL] Recorded by:  [EL] Alissa Armendariz RNA 11/07/18 0440    Row Name                Wound 11/07/18 0440 Left lateral foot blisters    Wound - Properties Group Date first assessed: 11/07/18 [EL] Time first assessed: 0440 [EL] Present On Admission : no;picture taken [EL] Side: Left [EL] Orientation: lateral [EL] Location: foot [EL] Type: blisters [EL] Recorded by:  [EL] Alissa Armendariz RNA 11/07/18 0440    Row Name                Wound 11/07/18 0700 Right heel blisters    Wound - Properties Group Date first assessed: 11/07/18 [DB] Time first  assessed: 0700 [DB] Present On Admission : no;picture taken [DB] Side: Right [DB] Location: heel [DB] Type: blisters [DB2] Stage, Pressure Injury: -- [DB2] Recorded by:  [DB] Carley White, RNA 11/07/18 1840 [DB2] Carley White, RNA 11/07/18 1841      User Key  (r) = Recorded By, (t) = Taken By, (c) = Cosigned By    Initials Name Effective Dates Discipline     Caren Nation, PTA 08/02/16 -  PT    AL Caren Post, PTA 08/02/16 -  PT    DB ChristopherJoselito noblewei GROVE, RNA 06/02/17 -  Nurse    Luz Lopez RN 08/02/16 -  Nurse    Alissa Holt, RNA 06/09/17 -  Nurse    Fernando Morales RN 08/02/17 -  Nurse    Jaclyn Pineda RN 01/12/18 -  Nurse          Wound 10/27/18 1052 Left hip incision (Active)   Dressing Appearance intact;no drainage;dry 11/10/2018  8:05 PM   Dressing Care, Wound gauze 11/10/2018  8:05 PM       Wound 11/03/18 1237 Left leg incision (Active)   Dressing Appearance dry;intact;no drainage 11/10/2018  8:05 PM   Closure [place] 11/10/2018  8:05 PM   Base dressing in place, unable to visualize 11/10/2018  8:05 PM   Periwound dry;intact 11/10/2018  8:05 PM   Drainage Amount none 11/10/2018  8:05 PM   Dressing Care, Wound gauze 11/10/2018  8:05 PM       Wound 11/05/18 0952 Left heel blisters (Active)   Dressing Appearance dry;intact 11/10/2018  8:05 PM   Periwound redness 11/10/2018  8:05 PM   Drainage Amount none 11/10/2018  8:05 PM       Wound 11/07/18 0439 Left lateral heel blisters (Active)   Dressing Appearance dry;intact 11/10/2018  8:05 PM       Wound 11/07/18 0440 Left lateral foot blisters (Active)   Dressing Appearance dry;intact 11/10/2018  8:05 PM       Wound 11/07/18 0700 Right heel blisters (Active)   Dressing Appearance dry;intact 11/10/2018  8:05 PM           Physical Therapy Education     Title: PT OT SLP Therapies (Active)     Topic: Physical Therapy (Active)     Point: Mobility training (Active)     Learning Progress Summary           Patient  Acceptance, E,D, NR by CW at 11/8/2018 11:15 AM    Comment:  bed mobility, benefit of activity, plan of care    Acceptance, E, VU by  at 11/4/2018  8:17 AM    Comment:  Pt educated on use of bed rails to assist with rolling.                   Point: Home exercise program (Active)     Learning Progress Summary           Patient Acceptance, E,D, NR by CW at 11/9/2018  9:15 AM    Comment:  HEP, benefits of activity, plan of care    Acceptance, E,D, NR by CW at 11/8/2018 11:15 AM    Comment:  bed mobility, benefit of activity, plan of care    Acceptance, E,D, NR by CW at 11/5/2018 11:34 AM    Comment:  benefit of exercise, plan of caare   Family Acceptance, E,D, NR by CW at 11/9/2018  9:15 AM    Comment:  HEP, benefits of activity, plan of care    Acceptance, E,D, NR by CW at 11/5/2018 11:34 AM    Comment:  benefit of exercise, plan of caare                   Point: Body mechanics (Active)     Learning Progress Summary           Patient Acceptance, E,D, NR by CW at 11/9/2018  9:15 AM    Comment:  HEP, benefits of activity, plan of care    Acceptance, E,D, NR by CW at 11/8/2018 11:15 AM    Comment:  bed mobility, benefit of activity, plan of care   Family Acceptance, E,D, NR by CW at 11/9/2018  9:15 AM    Comment:  HEP, benefits of activity, plan of care                   Point: Precautions (Done)     Learning Progress Summary           Patient Acceptance, E,TB,D, VU,DU by  at 11/11/2018  8:03 AM    Comment:  TTWB LLE    Acceptance, E,TB,D, VU,NR by  at 11/10/2018 10:49 AM    Comment:  TTWB LLE    Acceptance, E, NR by  at 11/7/2018  5:39 AM   Family Acceptance, E,TB,D, VU,DU by  at 11/7/2018 11:09 AM    Comment:  off loading heels                               User Key     Initials Effective Dates Name Provider Type Discipline     08/02/16 -  Caren Nation, PTA Physical Therapy Assistant PT     06/22/15 -  Mary Sylvester PTA Physical Therapy Assistant PT     06/09/17 -  Alissa Armendariz RNA  Registered Nurse Nurse     10/08/18 -  Andrea Roe, PT Student PT Student PT                PT Recommendation and Plan     Plan of Care Reviewed With: patient  Progress: improving  Outcome Summary: pt trans to EOB mod of 2, sit-stand mod of 2 able to maintain TTWB LLE, stood x 2 at BS, performed BLE ROM sitting EOB. trans back to bed mod-max of 2. Pt would benefit from SNF  Outcome Measures     Row Name 11/11/18 0800 11/10/18 1000 11/09/18 0900       How much help from another person do you currently need...    Turning from your back to your side while in flat bed without using bedrails?  2  -AH  2  -AH  2  -CW    Moving from lying on back to sitting on the side of a flat bed without bedrails?  2  -AH  2  -AH  2  -CW    Moving to and from a bed to a chair (including a wheelchair)?  1  -AH  1  -AH  1  -CW    Standing up from a chair using your arms (e.g., wheelchair, bedside chair)?  1  -AH  1  -AH  1  -CW    Climbing 3-5 steps with a railing?  1  -AH  1  -AH  1  -CW    To walk in hospital room?  1  -AH  1  -AH  1  -CW    AM-PAC 6 Clicks Score  8  -AH  8  -AH  8  -CW       Functional Assessment    Outcome Measure Options  AM-PAC 6 Clicks Basic Mobility (PT)  -AH  AM-PAC 6 Clicks Basic Mobility (PT)  -AH  AM-PAC 6 Clicks Basic Mobility (PT)  -CW    Row Name 11/08/18 1100             How much help from another person do you currently need...    Turning from your back to your side while in flat bed without using bedrails?  2  -CW      Moving from lying on back to sitting on the side of a flat bed without bedrails?  2  -CW      Moving to and from a bed to a chair (including a wheelchair)?  1  -CW      Standing up from a chair using your arms (e.g., wheelchair, bedside chair)?  1  -CW      Climbing 3-5 steps with a railing?  1  -CW      To walk in hospital room?  1  -CW      AM-PAC 6 Clicks Score  8  -CW         Functional Assessment    Outcome Measure Options  AM-PAC 6 Clicks Basic Mobility (PT)  -CW        User  Key  (r) = Recorded By, (t) = Taken By, (c) = Cosigned By    Initials Name Provider Type     Caren Nation, DELMA Physical Therapy Assistant    Mary Escobar PTA Physical Therapy Assistant         Time Calculation:   PT Charges     Row Name 11/11/18 0805             Time Calculation    Start Time  0730  -      Stop Time  0755  -      Time Calculation (min)  25 min  -      PT Received On  11/11/18  -      PT Goal Re-Cert Due Date  11/14/18  -         Time Calculation- PT    Total Timed Code Minutes- PT  25 minute(s)  -         Timed Charges    18290 - PT Therapeutic Activity Minutes  25  -AH        User Key  (r) = Recorded By, (t) = Taken By, (c) = Cosigned By    Initials Name Provider Type     Caren Nation, PTA Physical Therapy Assistant        Therapy Suggested Charges     Code   Minutes Charges    83110 (CPT®) Hc Pt Neuromusc Re Education Ea 15 Min      19673 (CPT®) Hc Pt Ther Proc Ea 15 Min      95042 (CPT®) Hc Gait Training Ea 15 Min      79747 (CPT®) Hc Pt Therapeutic Act Ea 15 Min 25 2    33819 (CPT®) Hc Pt Manual Therapy Ea 15 Min      89615 (CPT®) Hc Pt Iontophoresis Ea 15 Min      73575 (CPT®) Hc Pt Elec Stim Ea-Per 15 Min      08504 (CPT®) Hc Pt Ultrasound Ea 15 Min      17418 (CPT®) Hc Pt Self Care/Mgmt/Train Ea 15 Min      27416 (CPT®) Hc Pt Prosthetic (S) Train Initial Encounter, Each 15 Min      14319 (CPT®) Hc Pt Orthotic(S)/Prosthetic(S) Encounter, Each 15 Min      16152 (CPT®) Hc Orthotic(S) Mgmt/Train Initial Encounter, Each 15min      Total  25 2        Therapy Charges for Today     Code Description Service Date Service Provider Modifiers Qty    55618746626 HC PT THERAPEUTIC ACT EA 15 MIN 11/10/2018 Caren Nation, PTA GP 2    85760676816 HC PT THERAPEUTIC ACT EA 15 MIN 11/11/2018 Caren Nation, PTA GP 2          PT G-Codes  Outcome Measure Options: AM-PAC 6 Clicks Basic Mobility (PT)  AM-PAC 6 Clicks Score: 8  Functional Limitation: Mobility: Walking and moving  around  Mobility: Walking and Moving Around Current Status (): At least 60 percent but less than 80 percent impaired, limited or restricted  Mobility: Walking and Moving Around Goal Status (): At least 40 percent but less than 60 percent impaired, limited or restricted    Caren Nation, PTA  11/11/2018

## 2018-11-11 NOTE — PLAN OF CARE
Problem: Skin Injury Risk (Adult)  Goal: Skin Health and Integrity  Outcome: Ongoing (interventions implemented as appropriate)      Problem: Patient Care Overview  Goal: Plan of Care Review  Outcome: Ongoing (interventions implemented as appropriate)   11/11/18 1326   Coping/Psychosocial   Plan of Care Reviewed With patient   Plan of Care Review   Progress improving   OTHER   Outcome Summary VSS. Patient had minimal complaints of pain throughout shift. Patient incont. Patient turned q2h. Patient able to stand with PT. Patient hip incision has no further increased redness.      Goal: Individualization and Mutuality  Outcome: Ongoing (interventions implemented as appropriate)    Goal: Discharge Needs Assessment  Outcome: Ongoing (interventions implemented as appropriate)    Goal: Interprofessional Rounds/Family Conf  Outcome: Ongoing (interventions implemented as appropriate)      Problem: Fall Risk (Adult)  Goal: Absence of Fall  Outcome: Ongoing (interventions implemented as appropriate)      Problem: Nutrition, Imbalanced: Inadequate Oral Intake (Adult)  Goal: Improved Oral Intake  Outcome: Ongoing (interventions implemented as appropriate)    Goal: Prevent Further Weight Loss  Outcome: Ongoing (interventions implemented as appropriate)      Problem: Hip Arthroplasty (Total, Partial) (Adult)  Goal: Signs and Symptoms of Listed Potential Problems Will be Absent, Minimized or Managed (Hip Arthroplasty)  Outcome: Ongoing (interventions implemented as appropriate)

## 2018-11-12 PROCEDURE — 97530 THERAPEUTIC ACTIVITIES: CPT

## 2018-11-12 RX ORDER — HYDROCODONE BITARTRATE AND ACETAMINOPHEN 5; 325 MG/1; MG/1
1 TABLET ORAL EVERY 6 HOURS PRN
Qty: 12 TABLET | Refills: 0 | Status: SHIPPED | OUTPATIENT
Start: 2018-11-12

## 2018-11-12 RX ADMIN — CARBAMIDE PEROXIDE 6.5% 10 DROP: 6.5 LIQUID AURICULAR (OTIC) at 20:29

## 2018-11-12 RX ADMIN — HYDROCODONE BITARTRATE AND ACETAMINOPHEN 1 TABLET: 5; 325 TABLET ORAL at 09:16

## 2018-11-12 RX ADMIN — QUETIAPINE FUMARATE 50 MG: 25 TABLET, FILM COATED ORAL at 20:29

## 2018-11-12 RX ADMIN — POLYETHYLENE GLYCOL 3350 17 G: 17 POWDER, FOR SOLUTION ORAL at 09:06

## 2018-11-12 RX ADMIN — APIXABAN 2.5 MG: 2.5 TABLET, FILM COATED ORAL at 06:28

## 2018-11-12 RX ADMIN — APIXABAN 2.5 MG: 2.5 TABLET, FILM COATED ORAL at 17:09

## 2018-11-12 RX ADMIN — HYDROCHLOROTHIAZIDE 25 MG: 25 TABLET ORAL at 09:06

## 2018-11-12 RX ADMIN — POTASSIUM CHLORIDE 40 MEQ: 750 CAPSULE, EXTENDED RELEASE ORAL at 09:06

## 2018-11-12 RX ADMIN — FAMOTIDINE 20 MG: 20 TABLET, FILM COATED ORAL at 09:06

## 2018-11-12 NOTE — PLAN OF CARE
Problem: Skin Injury Risk (Adult)  Goal: Skin Health and Integrity  Outcome: Ongoing (interventions implemented as appropriate)      Problem: Patient Care Overview  Goal: Plan of Care Review  Outcome: Ongoing (interventions implemented as appropriate)   11/12/18 2833   Coping/Psychosocial   Plan of Care Reviewed With patient   Plan of Care Review   Progress improving   OTHER   Outcome Summary Oriented to person, confused. No neuro changes. Safety maintained. Family at bedside. PRN pain medication given before physical therapy. Possible discharge today to [facility] pending insurance approval. Left hip dressing CDI. Prevalon boots in place. Will continue to monitor.      Goal: Individualization and Mutuality  Outcome: Ongoing (interventions implemented as appropriate)    Goal: Discharge Needs Assessment  Outcome: Ongoing (interventions implemented as appropriate)    Goal: Interprofessional Rounds/Family Conf  Outcome: Ongoing (interventions implemented as appropriate)      Problem: Fall Risk (Adult)  Goal: Absence of Fall  Outcome: Ongoing (interventions implemented as appropriate)      Problem: Nutrition, Imbalanced: Inadequate Oral Intake (Adult)  Goal: Improved Oral Intake  Outcome: Ongoing (interventions implemented as appropriate)    Goal: Prevent Further Weight Loss  Outcome: Ongoing (interventions implemented as appropriate)      Problem: Hip Arthroplasty (Total, Partial) (Adult)  Goal: Signs and Symptoms of Listed Potential Problems Will be Absent, Minimized or Managed (Hip Arthroplasty)  Outcome: Ongoing (interventions implemented as appropriate)

## 2018-11-12 NOTE — DISCHARGE SUMMARY
[facility] Medicine Services  DISCHARGE SUMMARY       Date of Admission: 11/1/2018  Date of Discharge:  11/13/2018  Primary Care Physician: Mg Colmenares MD    Presenting Problem/History of Present Illness:  Closed displaced comminuted fracture of shaft of left femur, initial encounter (CMS/Formerly [facility]) [S72.352A]     Discharge Diagnoses:  1.  Acute traumatic comminuted periprosthetic fracture along the proximal diaphysis of the femur with removal of pre-existing trochanteric fixation nail  and placement of long trochanteric fixation nail left femur and cable of femoral shaft fracture 11/3/18  2.  Frequent falls  3.  Recent left intertrochanteric hip fracture with trochanteric fixation nailing on 10/27/18  4.  Acute postop blood loss anemia, required transfusion  5.  Acutely anticoagulated with Eliquis for deep vein thrombosis prophylaxis secondary to #3  6.  Constipation, chronic  7.  Mild protein malnutrition   8.  Essential hypertension   9.  Leukocytosis, suspected reactive  10.  Hypokalemia, resolved  11.  Ceruminosis bilaterally    Chief Complaint on Day of Discharge: No complaints  History of Present Illness on Day of Discharge:   Lying in bed.  Daughter present in room.  He stood at bedside with physical therapy yesterday.  He denies nausea, vomiting or abdominal pain.  He denies chest pain, palpitations, or shortness of breath.  Had bowel movement yesterday.  Dr. Colon says okay for discharge.  Plans for staples to be DC'd on 11/15.    Consults: Dr. Juan Colon, orthopedics    Procedures Performed: 11/3/18,Dr. Colon   #1 removal of pre-existing trochanteric fixation nail   #2 placement of long trochanteric fixation nail left femur and cabling of femoral shaft fracture    Pertinent Test Results:   Laboratory Data:    Results from last 7 days   Lab Units  11/11/18   0428  11/10/18   0425  11/08/18   0434  11/07/18   0823   WBC 10*3/mm3   --    --    --   12.06*    HEMOGLOBIN g/dL  9.3*  9.2*  8.9*  9.0*   HEMATOCRIT %  29.5*  28.7*  27.3*  28.1*   PLATELETS 10*3/mm3   --    --    --   434*        Results from last 7 days   Lab Units  11/11/18   0428  11/07/18 0823   SODIUM mmol/L  137  138   POTASSIUM mmol/L  4.7  4.7   CHLORIDE mmol/L  97*  96*   CO2 mmol/L  31.0  31.0   BUN mg/dL  20  23*   CREATININE mg/dL  0.80  0.75   CALCIUM mg/dL  8.7  8.9   GLUCOSE mg/dL  93  98     Imaging Results (all)     Procedure Component Value Units Date/Time    XR Femur 2 View Left [[id number]] Collected:  11/03/18 1254     Updated:  11/04/18 0939    Narrative:       EXAMINATION:   XR FEMUR 2 VW LEFT-  11/3/2018 12:54 PM CDT     HISTORY: Fractured left femur     9 images are obtained in the operating room.     Orthopedic pins present in the left femoral head and neck. Femoral nail  is present. Fracture fragments are relatively aligned. Cerclage wires  are present.     3 minutes 12 seconds of fluoroscopic time was used during this procedure  This report was finalized on 11/03/2018 12:56 by Dr. Donis Andrews MD.    FL C Arm During Surgery [[id number]] Collected:  11/03/18 1254     Updated:  11/04/18 0939    Narrative:       EXAMINATION:   XR FEMUR 2 VW LEFT-  11/3/2018 12:54 PM CDT     HISTORY: Fractured left femur     9 images are obtained in the operating room.     Orthopedic pins present in the left femoral head and neck. Femoral nail  is present. Fracture fragments are relatively aligned. Cerclage wires  are present.     3 minutes 12 seconds of fluoroscopic time was used during this procedure  This report was finalized on 11/03/2018 12:56 by Dr. Donis Andrews MD.    XR Abdomen KUB [[id number]] Collected:  11/02/18 0815     Updated:  11/02/18 0822    Narrative:       EXAMINATION: KUB 11/20/2018     HISTORY: Belly pain and leukocytosis     FINDINGS: KUB radiograph demonstrates mild constipation with a  nonspecific bowel gas pattern. There is no obstruction or free air.  There is arthritic  change of the right hip and previous fixation for a  left hip fracture. A Singleton catheter is in place.       Impression:       . Mild constipation. There is a nonspecific bowel gas  pattern. There is no obstruction or free air.  This report was finalized on 11/02/2018 08:19 by Dr. Harvey Landry MD.    XR Femur 2 View Left [[id number]] Collected:  11/02/18 0710     Updated:  11/02/18 0718    Narrative:       LEFT FEMUR, 2 VIEWS 11/2/2018 12:12 AM CDT     HISTORY: fall, pain; S72.352A-Displaced comminuted fracture of shaft of  left femur, initial encounter for closed fracture     COMPARISON: Intraoperative fluoroscopic images dated 10/27/2018     FINDINGS:     Frontal and lateral radiographs of the left femur were obtained.     TFN recently placed. New periprosthetic fracture with comminution of the  proximal diaphysis. Large displaced butterfly fragments are present and  there is foreshortening up to 7 cm. Distal femur appears intact.       Impression:       1. New comminuted periprosthetic fracture along the proximal diaphysis  of femur.  This report was finalized on 11/02/2018 07:15 by Dr Babak Franklin, .    XR Chest 1 View [[id number]] Collected:  11/02/18 0657     Updated:  11/02/18 0701    Narrative:       EXAMINATION:   XR CHEST 1 VW-  11/2/2018 6:57 AM CDT     HISTORY: Preop     Frontal upright radiograph of the chest 11/2/2018 1:31 AM CDT     COMPARISON: October 27, 2018.     FINDINGS:   The lungs are clear. The cardiac silhouette is normal. Vascular  calcifications present aortic arch..      The osseous structures and surrounding soft tissues demonstrate no acute  abnormality.       Impression:       1. No radiographic evidence of acute cardiopulmonary process.        This report was finalized on 11/02/2018 06:57 by Dr. Donis Andrews MD.        Hospital Course  Patient is a 88 y.o. male presented to [facility] after an unwitnessed fall at the  nursing home.  Patient was recently at our facility 10/28/18-10/31/18 after left trochanteric fixation nailing by Isaiah.  After fall at nursing home he was found to have new comminuted periprosthetic fracture along the proximal diaphysis of fracture.  Patient was discharged on Coumadin for deep vein thrombosis prophylaxis on 10/31/18.  Most recent INR 1.46.  Hemoglobin 8.7.  Coumadin placed on hold.    He was admitted to the orthopedic floor.  He was made nothing by mouth.  He was seen in consultation by Dr. Juan Colon with orthopedics and recommendations were for repeat fixation left femur fracture with longer nail.  As patient was on Coumadin he received fresh frozen plasma and was transfused 2 units packed red blood cells.  On 11/3/18 Dr. Colon removed pre-existing trochanteric fixation nail and replaced long trochanteric fixation nail left femur and cabling of femoral shaft fracture.  Plans were for slow physical therapy as Dr. Colon did not feel that patient was going to be able to protect his weight-bearing.  Physical therapy and occupational therapy consulted.  Eventually, patient was allowed strict toe-touch weightbearing left lower extremity.  Dr. Colon elected for patient to remain in the hospital over the weekend as there were some erythema along staple line.  Dr. Colon evaluated patient on 11/12/18 and cleared for discharge.  Staples may be removed on 11/15/18.  He will follow-up with Dr. Colon in 3 weeks on 12/19/18.     In addition, patient required transfusion 4 units packed red blood cells and total of 4 units fresh frozen plasma during hospital stay.  He was placed on Eliquis 2.5 mg twice daily for deep vein thrombosis prophylaxis.  May discontinue Eliquis after last dose on 12/4/18.  H/H has remained stable with hemoglobin 9.3 hematocrit 29.5 at discharge.  No signs of bleeding.    Patient has chronic constipation.  He had bowel movement day prior to discharge.  Hypertension has  "remained stable.  Potassium was low.  He received oral replacement and potassium 4.7 at discharge.    He is extremely hard of hearing.  He wears hearing aids.  Daughter thought hearing was worse.  He was noted have ceruminosis bilaterally and treated with Debros ear drops.    He has confusion at baseline.    He was noted to have bilateral heel breakdown with 2 cm ×1 cm reddened area left heel and 1 cm reddened area right heel.  Mepilex to both heels.  Prolonged boots in place.    On 11/12/18 he is stable for discharge.  He stood at the bedside with physical therapy.  Left hip staple line clean and dry.  Sylvia intact and may be discontinued on 11/15/18.  He denies nausea, vomiting or abdominal pain.  He denies chest pain, palpitations or shortness of breath.  Discontinue Eliquis after last dose on 12/4/18.  Follow-up with Dr. Colon 12/19/18.    Physical Exam on Discharge:  /82 (BP Location: Right arm, Patient Position: Lying)   Pulse 100   Temp 98.1 °F (36.7 °C) (Oral)   Resp 16   Ht 182.9 cm (72\")   Wt 79.8 kg (175 lb 14.4 oz)   SpO2 97%   BMI 23.86 kg/m²   Physical Exam  Constitutional: He appears well-developed and well-nourished.   Head: Normocephalic and atraumatic. Hard of hearing.  Ear wax noted both ear canals.   Eyes: Pupils are equal, round, and reactive to light. EOM are normal. Eyeglasses in place.  Neck: Normal range of motion. Neck supple.   Cardiovascular: Normal rate, regular rhythm, normal heart sounds and intact distal pulses.  Exam reveals no gallop and no friction rub.  No murmur heard.  Pulmonary/Chest: Effort normal and breath sounds normal. No respiratory distress. He has no wheezes. He has no rales. No oxygen in use   Abdominal: Soft. Bowel sounds are normal. There is no distention or tenderness.   Genitourinary Comments: Incontinent. Diaper in place  Musculoskeletal: He exhibits edema (Left lower extremity, improving) and tenderness (Left hip).   SCDs bilateral lower " extremities   Neurological: Identifies self and follows commands  Skin: Skin is warm and dry. Staples intact left hip. Less redness at staple line. Left heel with 2 cm x 1.5 cm red area, right heel with 1 cm red area. No open areas on heels. Mepelix to heels. Prevalon boots in place.  Psychiatric: He has a normal mood and affect. His behavior is normal. Judgment and thought content normal.     Condition on Discharge: Stable    Discharge Disposition:  [facility]    Discharge Diet:   Diet Instructions     Advance Diet As Tolerated        As tolerated    Activity at Discharge:   Activity Instructions     Discharge Activity Restrictions      Activity at Discharge:    1.  Strict toe-touch weightbearing left lower extremity    Discharge Care Plan / Instructions:   1.  Strict toe-touch weightbearing left lower extremity  2.  Discontinue Sylvia left hip 11/15/18  3.  Follow with Dr. Colon 12/19/18  4.  Eliquis 2.5 mg twice daily. Discontinue after last dose on 12/4/18  5.  Mepilex to bilateral heel.  Prevalon boots boot feet.         1.  Strict toe-touch weightbearing left lower extremity    Discharge Care Plan / Instructions:   1.  Strict toe-touch weightbearing left lower extremity  2.  Discontinue Purling left hip 11/15/18  3.  Follow with Dr. Colon 12/19/18  4.  Eliquis 2.5 mg twice daily. Discontinue after last dose on 12/4/18  5.  Mepilex to bilateral heel.  Prevalon boots boot feet.    Discharge Medications:     Discharge Medications      New Medications      Instructions Start Date   apixaban 2.5 MG tablet tablet  Commonly known as:  ELIQUIS   2.5 mg, Oral, Every 12 Hours Scheduled, Discontinue after last dose on 12/4/18      HYDROcodone-acetaminophen 5-325 MG per tablet  Commonly known as:  NORCO   1 tablet, Oral, Every 6 Hours PRN         Continue These Medications      Instructions Start Date   bisacodyl 5 MG EC tablet  Commonly known as:  DULCOLAX   10 mg, Oral, Daily PRN       carbamide peroxide 6.5 % otic solution  Commonly known as:  DEBROX   10 drops, Both Ears, Every Night at Bedtime      hydrochlorothiazide 25 MG tablet  Commonly known as:  HYDRODIURIL   25 mg, Oral, Daily      magnesium hydroxide 400 MG/5ML suspension  Commonly known as:  MILK OF MAGNESIA   5 mL, Oral, Daily PRN      polyethylene glycol packet  Commonly known as:  MIRALAX   17 g, Oral, Daily      QUEtiapine 50 MG tablet  Commonly known as:  SEROquel   50 mg, Oral, Nightly      vitamin B-12 1000 MCG tablet  Commonly known as:  CYANOCOBALAMIN   1,000 mcg, Oral, Every 14 Days, Fridays      vitamin D 59860 units capsule capsule  Commonly known as:  ERGOCALCIFEROL   50,000 Units, Oral, Weekly, On Fridays           Follow-up Appointments:    Contact information for follow-up providers     Juan Colon MD Follow up on 12/19/2018.    Specialty:  Orthopedic Surgery  Why:  12-19 at 130  Contact information:  [address]  [facility] [address]  [phone]             Mg Colmenares MD Follow up.    Specialty:  Family Medicine  Why:  to be followed by [facility] at [facility]  Contact information:  [address] [facility] [address]  [phone]                   Contact information for after-discharge care     Destination     [facility] Follow up.    Service:  Skilled Nursing  Contact information:  [address] [id number] [phone]                           Future Appointments:  No future appointments.    Test Results Pending at Discharge: none    The above documentation resulted from a face-to-face encounter by me Alejandra RUCKER, [facility].    ALKA Abdul  11/13/18  8:59 AM    Time:  This discharge process required 40 minutes for completion.     Plan discussed with , patient, and daughter.    Time spent in face-to-face evaluation, chart review, planning and education 40 minutes.  Please note that portions of this note  may have been completed with a voice recognition program. Efforts were made to edit the dictations, but occasionally words are mistranscribed.

## 2018-11-12 NOTE — PROGRESS NOTES
"    [facility] Medicine Services  INPATIENT PROGRESS NOTE    Length of Stay: 10  Date of Admission: 11/1/2018  Primary Care Physician: Mg Colmenares MD    Subjective   Chief Complaint: no complaints  HPI   Lying in bed.  Daughter in room.  He stood at the bedside with physical therapy today.  Denies nausea, vomiting or abdominal pain.  Denies chest pain, palpitations or shortness of breath.  Hopes to get to rehabilitation soon.  Awaiting precertification.    Review of Systems   Constitutional: Positive for activity change (After fall) and fatigue.   HENT: Negative for congestion and trouble swallowing.    Eyes: Negative for photophobia and visual disturbance.   Respiratory: Negative for cough, shortness of breath and wheezing.    Cardiovascular: Positive for leg swelling (Left hip). Negative for chest pain and palpitations.   Gastrointestinal: Positive for constipation (resolved). Negative for diarrhea, nausea and vomiting.   Endocrine: Negative for cold intolerance, heat intolerance and polyuria.   Genitourinary: Negative for dysuria and urgency.   Musculoskeletal: Positive for gait problem and myalgias.   Skin: Positive for wound (Left hip).   Allergic/Immunologic: Negative for immunocompromised state.   Neurological: Positive for weakness.   Hematological: Negative for adenopathy. Does not bruise/bleed easily.   Psychiatric/Behavioral: Positive for confusion.     All pertinent negatives and positives are as above. All other systems have been reviewed and are negative unless otherwise stated.     Objective    Temp:  [98.2 °F (36.8 °C)-98.4 °F (36.9 °C)] 98.4 °F (36.9 °C)  Heart Rate:  [89-92] 92  Resp:  [16-18] 16  BP: (113-128)/(70-82) 128/82  Physical Exam  /82 (BP Location: Right arm, Patient Position: Lying)   Pulse 92   Temp 98.4 °F (36.9 °C) (Oral)   Resp 16   Ht 182.9 cm (72\")   Wt 79.8 kg (175 lb 14.4 oz)   SpO2 98%   BMI 23.86 kg/m²   Physical " Exam  Constitutional: He appears well-developed and well-nourished.   Head: Normocephalic and atraumatic. Hard of hearing.  Ear wax noted both ear canals.   Eyes: Pupils are equal, round, and reactive to light. EOM are normal. Eyeglasses in place.  Neck: Normal range of motion. Neck supple.   Cardiovascular: Normal rate, regular rhythm, normal heart sounds and intact distal pulses.  Exam reveals no gallop and no friction rub.  No murmur heard.  Pulmonary/Chest: Effort normal and breath sounds normal. No respiratory distress. He has no wheezes. He has no rales. No oxygen in use   Abdominal: Soft. Bowel sounds are normal. There is no distention or tenderness.   Genitourinary Comments: Incontinent. Diaper in place  Musculoskeletal: He exhibits edema (Left lower extremity, improving) and tenderness (Left hip).   SCDs bilateral lower extremities   Neurological: Identifies self and follows commands  Skin: Skin is warm and dry. Staples intact left hip. Less redness at staple line. Left heel with 2 cm x 1.5 cm red area, right heel with 1 cm red area. No open areas on heels. Mepelix to heels. Prevalon boots in place.  Psychiatric: He has a normal mood and affect. His behavior is normal. Judgment and thought content normal.     Results Review:  I have reviewed the labs, radiology results, and diagnostic studies.    Laboratory Data:   Results from last 7 days   Lab Units  11/11/18   0428  11/10/18   0425  11/08/18   0434  11/07/18   0823  11/06/18   0437   WBC 10*3/mm3   --    --    --   12.06*  11.45*   HEMOGLOBIN g/dL  9.3*  9.2*  8.9*  9.0*  8.7*   HEMATOCRIT %  29.5*  28.7*  27.3*  28.1*  27.3*   PLATELETS 10*3/mm3   --    --    --   434*  312        Results from last 7 days   Lab Units  11/11/18 0428 11/07/18 0823 11/06/18 0437   SODIUM mmol/L  137  138  138   POTASSIUM mmol/L  4.7  4.7  3.5   CHLORIDE mmol/L  97*  96*  97*   CO2 mmol/L  31.0  31.0  33.0*   BUN mg/dL  20  23*  28*   CREATININE mg/dL  0.80  0.75   0.85   CALCIUM mg/dL  8.7  8.9  8.5   GLUCOSE mg/dL  93  98  105*     Imaging Results (all)     Procedure Component Value Units Date/Time    XR Femur 2 View Left [[id number]] Collected:  11/03/18 1254     Updated:  11/04/18 0939    Narrative:       EXAMINATION:   XR FEMUR 2 VW LEFT-  11/3/2018 12:54 PM CDT     HISTORY: Fractured left femur     9 images are obtained in the operating room.     Orthopedic pins present in the left femoral head and neck. Femoral nail  is present. Fracture fragments are relatively aligned. Cerclage wires  are present.     3 minutes 12 seconds of fluoroscopic time was used during this procedure  This report was finalized on 11/03/2018 12:56 by Dr. Donis Andrews MD.    FL C Arm During Surgery [[id number]] Collected:  11/03/18 1254     Updated:  11/04/18 0939    Narrative:       EXAMINATION:   XR FEMUR 2 VW LEFT-  11/3/2018 12:54 PM CDT     HISTORY: Fractured left femur     9 images are obtained in the operating room.     Orthopedic pins present in the left femoral head and neck. Femoral nail  is present. Fracture fragments are relatively aligned. Cerclage wires  are present.     3 minutes 12 seconds of fluoroscopic time was used during this procedure  This report was finalized on 11/03/2018 12:56 by Dr. Donis Andrews MD.    XR Abdomen KUB [[id number]] Collected:  11/02/18 0815     Updated:  11/02/18 0822    Narrative:       EXAMINATION: KUB 11/20/2018     HISTORY: Belly pain and leukocytosis     FINDINGS: KUB radiograph demonstrates mild constipation with a  nonspecific bowel gas pattern. There is no obstruction or free air.  There is arthritic change of the right hip and previous fixation for a  left hip fracture. A Singleton catheter is in place.       Impression:       . Mild constipation. There is a nonspecific bowel gas  pattern. There is no obstruction or free air.  This report was finalized on 11/02/2018 08:19 by Dr. Harvey Landry MD.    XR Femur 2 View Left [[id number]] Collected:   11/02/18 0710     Updated:  11/02/18 0718    Narrative:       LEFT FEMUR, 2 VIEWS 11/2/2018 12:12 AM CDT     HISTORY: fall, pain; S72.352A-Displaced comminuted fracture of shaft of  left femur, initial encounter for closed fracture     COMPARISON: Intraoperative fluoroscopic images dated 10/27/2018     FINDINGS:     Frontal and lateral radiographs of the left femur were obtained.     TFN recently placed. New periprosthetic fracture with comminution of the  proximal diaphysis. Large displaced butterfly fragments are present and  there is foreshortening up to 7 cm. Distal femur appears intact.       Impression:       1. New comminuted periprosthetic fracture along the proximal diaphysis  of femur.  This report was finalized on 11/02/2018 07:15 by Dr Babak Franklin, .    XR Chest 1 View [[id number]] Collected:  11/02/18 0657     Updated:  11/02/18 0701    Narrative:       EXAMINATION:   XR CHEST 1 VW-  11/2/2018 6:57 AM CDT     HISTORY: Preop     Frontal upright radiograph of the chest 11/2/2018 1:31 AM CDT     COMPARISON: October 27, 2018.     FINDINGS:   The lungs are clear. The cardiac silhouette is normal. Vascular  calcifications present aortic arch..      The osseous structures and surrounding soft tissues demonstrate no acute  abnormality.       Impression:       1. No radiographic evidence of acute cardiopulmonary process.        This report was finalized on 11/02/2018 06:57 by Dr. Donis Andrews MD.           Intake/Output  No intake or output data in the 24 hours ending 11/12/18 5427    Scheduled Meds    apixaban 2.5 mg Oral Q12H   bisacodyl 10 mg Rectal Daily   carbamide peroxide 10 drop Both Ears Nightly   famotidine 20 mg Oral Daily   hydrochlorothiazide 25 mg Oral Daily   polyethylene glycol 17 g Oral Daily   potassium chloride 40 mEq Oral Daily   QUEtiapine 50 mg Oral Nightly   sodium chloride 3 mL Intravenous Q12H       I have reviewed the patient current medications.     Assessment/Plan      Assessment:  1.  Acute traumatic comminuted periprosthetic fracture along the proximal diaphysis of the femur with removal of pre-existing trochanteric fixation nail  and placement of long trochanteric fixation nail left femur and cable of femoral shaft fracture 11/3/18  2.  Frequent falls  3.  Recent left intertrochanteric hip fracture with trochanteric fixation nailing on 10/27/18  4.  Acute postop blood loss anemia, required transfusion  5.  Acutely anticoagulated with Eliquis for deep vein thrombosis prophylaxis secondary to #3  6.  Constipation, chronic  7.  Mild protein malnutrition   8.  Essential hypertension   9.  Leukocytosis, suspected reactive  10.  Hypokalemia, resolved  11.  Ceruminosis bilaterally    Plan:  1.  Removal of pre-existing trochanteric fixation nail and placement of long trochanteric fixation nail left femur and cabling of femoral shaft fracture 11/3/18  2.  Physical therapy occupational therapy slowly per orthopedics. Stood at bedside with physical therapy.  3.  Transfused 4 units PRBC and 4 units FFP total since admission   4.  Eliquis and SCDs for deep vein thrombosis prophylaxis  5.  Debros ear drops bilaterally for ear wax started 11/9  6.  H/H 9.3/28.5, on 11/11 stable  7.  Dr. Colon has seen today.  Cleared for discharge.  Plans for staple removal on 11/15.  Edema resolving left upper extremity.  Staples clean and dry.  No bleeding noted.  8.   for discharge planning.  Insurance has to be read precertified as patient remained in hospital over weekend.  Awaiting to hear from insurance.     The above documentation resulted from a face-to-face encounter by me Alejandra RUCKER, [facility].      Discharge Planning: I expect patient to be discharged to [facility] when insurance approves.      ALKA Abdul   11/12/18   3:57 PM

## 2018-11-12 NOTE — THERAPY TREATMENT NOTE
Acute Care - Physical Therapy Treatment Note  [facility]     Patient Name: Burke Reinoso Jr.  : 1930  MRN: [id number]  Today's Date: 2018  Onset of Illness/Injury or Date of Surgery: 18  Date of Referral to : 18  Referring Physician: Dr. Colon    Admit Date: 2018    Visit Dx:    ICD-10-CM ICD-9-CM   1. Closed displaced comminuted fracture of shaft of left femur, initial encounter (CMS/[facility]) S72.352A 821.01   2. Dysphagia, unspecified type R13.10 787.20   3. Impaired mobility Z74.09 799.89     Patient Active Problem List   Diagnosis   • Closed displaced intertrochanteric fracture of left femur (CMS/[facility])   • Closed displaced comminuted fracture of shaft of left femur (CMS/[facility])       Therapy Treatment    Rehabilitation Treatment Summary     Row Name 18 1025 18 0911          Treatment Time/Intention    Discipline  physical therapy assistant  -  physical therapy assistant  -     Document Type  therapy note (daily note)  -[facility]  --     Subjective Information  complains of;pain  -[facility]  --     Mode of Treatment  physical therapy  -[facility]  --     Comment  --  eating breakfast  -[facility]     Existing Precautions/Restrictions  fall;left;weight bearing TTWB LLE, progress slowly  -[facility]  --     Treatment Considerations/Comments  [facility]  -[facility]  --     Recorded by [] Caren Nation, [facility] 18 1025  [2] Caren Nation, PTA 18 1055 [] Caren Nation, PTA 18 0912  [2] Caren Nation, [facility] [place] 18 0915     Row Name 18 1025             Mobility Assessment/Intervention    Extremity Weight-bearing Status  left lower extremity  -      Left Lower Extremity (Weight-bearing Status)  toe touch weight-bearing (TTWB)  -      Recorded by [] Caren Nation, [facility] [place] [facility] 18 1055      Row Name 18 1025             Bed Mobility Assessment/Treatment    Supine-Sit [place] (Bed Mobility)  minimum assist (75% patient effort);moderate assist (50% patient effort);2 person  assist;verbal cues  -      Sit-Supine [place] (Bed Mobility)  moderate assist (50% patient effort);maximum assist (25% patient effort);2 person assist;verbal cues  -      Bed Mobility, Safety Issues  decreased use of arms for pushing/pulling;decreased use of legs for bridging/pushing  -      Recorded by [] Caren Nation, PTA 11/12/18 1055      Row Name 11/12/18 1025             Transfer Assessment/Treatment    Maintains Weight-bearing Status (Transfers)  able to maintain;verbal cues to maintain;nonverbal cues (demo/gesture) to maintain;physical assist to maintain  -      Comment (Transfers)  stood x 2 at   -      Recorded by [] Caren Nation, PTA 11/12/18 1055      Row Name 11/12/18 1025             Sit-Stand Transfer    Sit-Stand [place] (Transfers)  moderate assist (50% patient effort);2 person assist;verbal cues  -      Assistive Device (Sit-Stand Transfers)  walker, front-wheeled  -      Recorded by [] Caren Nation, PTA 11/12/18 1055      Row Name 11/12/18 1025             Stand-Sit Transfer    Stand-Sit [place] (Transfers)  minimum assist (75% patient effort);moderate assist (50% patient effort);2 person assist;verbal cues  -      Recorded by [] Caren Nation, PTA 11/12/18 1055      Row Name 11/12/18 1025             Therapeutic Exercise    Lower Extremity (Therapeutic Exercise)  LAQ (long arc quad), bilateral;marching while seated  -      Lower Extremity Range of Motion (Therapeutic Exercise)  ankle dorsiflexion/plantar flexion, bilateral  -      Exercise Type (Therapeutic Exercise)  AROM (active range of motion);AAROM (active assistive range of motion)  -      Position (Therapeutic Exercise)  seated  -      Sets/Reps (Therapeutic Exercise)  10 x 2  -      Recorded by [] Caren Nation, PTA 11/12/18 1055      Row Name 11/12/18 1025             Positioning and Restraints    Pre-Treatment Position  in bed  -      Post Treatment Position  bed  -       In Bed  fowlers;call light within reach;encouraged to call for assist;exit alarm on;with family/caregiver;SCD pump applied;waffle boots/both  -      Recorded by [] Caren Nation, PTA 11/12/18 1055      Row Name 11/12/18 1025             Pain Scale: Numbers Pre/Post-Treatment    Pain Location - Side  Left  -      Pain Location - Orientation  lower  -      Pain Location  extremity  -AH      Recorded by [] Caren Nation, PTA 11/12/18 1055      Row Name 11/12/18 1025             Pain Scale: FACES Pre/Post-Treatment    Pain: FACES Scale, Pretreatment  4-->hurts little more  -AH      Pre/Post Treatment Pain Comment  with bed mobility  -      Recorded by [] Caren Nation, PTA 11/12/18 1055      Row Name                Wound 10/27/18 1052 Left hip incision    Wound - Properties Group Date first assessed: 10/27/18 [AD] Time first assessed: 1052 [AD] Side: Left [AD] Location: hip [AD] Type: incision [AD] Recorded by:  [AD] Fernando Rajput RN 10/27/18 1052    Row Name                Wound 11/03/18 1237 Left leg incision    Wound - Properties Group Date first assessed: 11/03/18 [LC] Time first assessed: 1237 [LC] Side: Left [LC] Location: leg [LC] Type: incision [LC] Recorded by:  [LC] Luz Medina RN 11/03/18 1237    Row Name                Wound 11/05/18 0952 Left heel blisters    Wound - Properties Group Date first assessed: 11/05/18 [AW] Time first assessed: 0952 [AW] Present On Admission : picture taken;no [AW] Side: Left [AW] Location: heel [AW] Type: blisters [AW] Recorded by:  [AW] Jaclyn Martinez RN 11/05/18 1520    Row Name                Wound 11/07/18 0439 Left lateral heel blisters    Wound - Properties Group Date first assessed: 11/07/18 [EL] Time first assessed: 0439 [EL] Present On Admission : no;picture taken [EL] Side: Left [EL] Orientation: lateral [EL] Location: heel [EL] Type: blisters [EL] Recorded by:  [EL] Alissa Armendariz, RNA 11/07/18 0440    Row Name                 Wound 11/07/18 0440 Left lateral foot blisters    Wound - Properties Group Date first assessed: 11/07/18 [EL] Time first assessed: 0440 [EL] Present On Admission : no;picture taken [EL] Side: Left [EL] Orientation: lateral [EL] Location: foot [EL] Type: blisters [EL] Recorded by:  [EL] Alissa Armendariz, RNA 11/07/18 0440    Row Name                Wound 11/07/18 0700 Right heel blisters    Wound - Properties Group Date first assessed: 11/07/18 [DB] Time first assessed: 0700 [DB] Present On Admission : no;picture taken [DB] Side: Right [DB] Location: heel [DB] Type: blisters [DB2] Stage, Pressure Injury: -- [DB2] Recorded by:  [DB] Carley White, RNA 11/07/18 1840 [DB2] Carley White, RNA 11/07/18 1841      User Key  (r) = Recorded By, (t) = Taken By, (c) = Cosigned By    Initials Name Effective Dates Discipline     Caren Nation, PTA 08/02/16 -  PT    Carley Aparicio, RNA 06/02/17 -  Nurse    Luz Lopez, RN 08/02/16 -  Nurse    Alissa Holt, RNA 06/09/17 -  Nurse    Fernando Morales RN 08/02/17 -  Nurse    Jaclyn Pineda RN 01/12/18 -  Nurse          Wound 10/27/18 1052 Left hip incision (Active)   Dressing Appearance dry;intact;no drainage 11/12/2018  9:16 AM   Drainage Amount none 11/12/2018  9:16 AM   Dressing Care, Wound gauze;low-adherent 11/12/2018  9:16 AM       Wound 11/03/18 1237 Left leg incision (Active)   Dressing Appearance dry;intact 11/11/2018  9:15 PM   Closure Open to air 11/12/2018  9:16 AM   Base dressing in place, unable to visualize 11/11/2018  9:15 PM   Periwound dry;intact 11/11/2018  9:15 PM   Drainage Amount none 11/12/2018  9:16 AM   Dressing Care, Wound gauze;low-adherent 11/11/2018  9:15 PM       Wound 11/05/18 0952 Left heel blisters (Active)   Dressing Appearance dry;intact 11/12/2018  9:16 AM   Periwound redness 11/12/2018  9:16 AM   Drainage Amount none 11/12/2018  9:16 AM   Care, Wound other (see comments) 11/11/2018  9:15 PM    Dressing Care, Wound dressing changed;foam 11/12/2018  9:16 AM       Wound 11/07/18 0439 Left lateral heel blisters (Active)   Dressing Appearance dry;intact 11/12/2018  9:16 AM   Drainage Amount none 11/12/2018  9:16 AM   Care, Wound other (see comments) 11/11/2018  9:15 PM   Dressing Care, Wound dressing changed;foam 11/12/2018  9:16 AM       Wound 11/07/18 0440 Left lateral foot blisters (Active)   Dressing Appearance dry;intact 11/12/2018  9:16 AM   Drainage Amount none 11/12/2018  9:16 AM   Dressing Care, Wound dressing changed;foam 11/12/2018  9:16 AM       Wound 11/07/18 0700 Right heel blisters (Active)   Dressing Appearance dry;intact 11/12/2018  9:16 AM   Drainage Amount none 11/12/2018  9:16 AM   Care, Wound other (see comments) 11/11/2018  9:15 PM   Dressing Care, Wound foam;dressing changed 11/12/2018  9:16 AM           Physical Therapy Education     Title: PT OT SLP Therapies (Active)     Topic: Physical Therapy (Active)     Point: Mobility training (Active)     Learning Progress Summary           Patient Acceptance, E,D, NR by CW at 11/8/2018 11:15 AM    Comment:  bed mobility, benefit of activity, plan of care    Acceptance, E, VU by CB at 11/4/2018  8:17 AM    Comment:  Pt educated on use of bed rails to assist with rolling.                   Point: Home exercise program (Active)     Learning Progress Summary           Patient Acceptance, E,D, NR by CW at 11/9/2018  9:15 AM    Comment:  HEP, benefits of activity, plan of care    Acceptance, E,D, NR by CW at 11/8/2018 11:15 AM    Comment:  bed mobility, benefit of activity, plan of care    Acceptance, E,D, NR by CW at 11/5/2018 11:34 AM    Comment:  benefit of exercise, plan of caare   Family Acceptance, E,D, NR by CW at 11/9/2018  9:15 AM    Comment:  HEP, benefits of activity, plan of care    Acceptance, E,D, NR by CW at 11/5/2018 11:34 AM    Comment:  benefit of exercise, plan of caare                   Point: Body mechanics (Active)      Learning Progress Summary           Patient Acceptance, E,D, NR by CW at 11/9/2018  9:15 AM    Comment:  HEP, benefits of activity, plan of care    Acceptance, E,D, NR by  at 11/8/2018 11:15 AM    Comment:  bed mobility, benefit of activity, plan of care   Family Acceptance, E,D, NR by CW at 11/9/2018  9:15 AM    Comment:  HEP, benefits of activity, plan of care                   Point: Precautions (Active)     Learning Progress Summary           Patient Acceptance, E,TB, NR by  at 11/12/2018 10:55 AM    Comment:  TTWB LLE with trans    Acceptance, E,TB,D, VU,DU by  at 11/11/2018  8:03 AM    Comment:  TTWB LLE    Acceptance, E,TB,D, VU,NR by  at 11/10/2018 10:49 AM    Comment:  TTWB LLE    Acceptance, E, NR by  at 11/7/2018  5:39 AM   Family Acceptance, E,TB,D, VU,DU by  at 11/7/2018 11:09 AM    Comment:  off loading heels                               User Key     Initials Effective Dates Name Provider Type Discipline     08/02/16 -  Caren Nation PTA Physical Therapy Assistant PT     06/22/15 -  Mary Sylvester PTA Physical Therapy Assistant PT     06/09/17 -  Alissa Armendariz RNA Registered Nurse Nurse     10/08/18 -  Andrea Roe PT Student PT Student PT                PT Recommendation and Plan     Plan of Care Reviewed With: patient  Progress: improving  Outcome Summary: pt trans to EOB mod of 2, sit-stand mod of 2, pt able to maintain TTWB LLE with cues, trans back to bed mod of 2. Pt would benefit from SNF  Outcome Measures     Row Name 11/12/18 1000 11/11/18 0800 11/10/18 1000       How much help from another person do you currently need...    Turning from your back to your side while in flat bed without using bedrails?  2  -AH  2  -AH  2  -AH    Moving from lying on back to sitting on the side of a flat bed without bedrails?  2  -AH  2  -AH  2  -AH    Moving to and from a bed to a chair (including a wheelchair)?  1  -AH  1  -AH  1  -AH    Standing up from a chair  using your arms (e.g., wheelchair, bedside chair)?  1  -AH  1  -AH  1  -AH    Climbing 3-5 steps with a railing?  1  -AH  1  -AH  1  -AH    To walk in hospital room?  1  -AH  1  -AH  1  -AH    AM-PAC 6 Clicks Score  8  -AH  8  -AH  8  -AH       Functional Assessment    Outcome Measure Options  AM-PAC 6 Clicks Basic Mobility (PT)  -AH  AM-PAC 6 Clicks Basic Mobility (PT)  -  AM-PAC 6 Clicks Basic Mobility (PT)  -      User Key  (r) = Recorded By, (t) = Taken By, (c) = Cosigned By    Initials Name Provider Type    Caren Ellington, DELMA Physical Therapy Assistant         Time Calculation:   PT Charges     Row Name 11/12/18 1057             Time Calculation    Start Time  1025  -      Stop Time  1048  -      Time Calculation (min)  23 min  -      PT Received On  11/12/18  -      PT Goal Re-Cert Due Date  11/14/18  -         Time Calculation- PT    Total Timed Code Minutes- PT  23 minute(s)  -         Timed Charges    56774 - PT Therapeutic Activity Minutes  23  -AH        User Key  (r) = Recorded By, (t) = Taken By, (c) = Cosigned By    Initials Name Provider Type    Caren Ellington, DELMA Physical Therapy Assistant        Therapy Suggested Charges     Code   Minutes Charges    54946 (CPT®) Hc Pt Neuromusc Re Education Ea 15 Min      46450 (CPT®) Hc Pt Ther Proc Ea 15 Min      02964 (CPT®) Hc Gait Training Ea 15 Min      69650 (CPT®) Hc Pt Therapeutic Act Ea 15 Min 23 2    32087 (CPT®) Hc Pt Manual Therapy Ea 15 Min      39219 (CPT®) Hc Pt Iontophoresis Ea 15 Min      40298 (CPT®) Hc Pt Elec Stim Ea-Per 15 Min      66373 (CPT®) Hc Pt Ultrasound Ea 15 Min      11986 (CPT®) Hc Pt Self Care/Mgmt/Train Ea 15 Min      64936 (CPT®) Hc Pt Prosthetic (S) Train Initial Encounter, Each 15 Min      41324 (CPT®) Hc Pt Orthotic(S)/Prosthetic(S) Encounter, Each 15 Min      71655 (CPT®) Hc Orthotic(S) Mgmt/Train Initial Encounter, Each 15min      Total  23 2        Therapy Charges for Today     Code Description  Service Date Service Provider Modifiers Qty    72891867035 HC PT THERAPEUTIC ACT EA 15 MIN 11/11/2018 Caren Nation, PTA GP 2    97097541082 HC PT THER PROC EA 15 MIN 11/11/2018 Caren Nation, PTA GP 2    32413606811 HC PT THERAPEUTIC ACT EA 15 MIN 11/12/2018 Caren Nation, PTA GP 2          PT G-Codes  Outcome Measure Options: AM-PAC 6 Clicks Basic Mobility (PT)  AM-PAC 6 Clicks Score: 8  Functional Limitation: Mobility: Walking and moving around  Mobility: Walking and Moving Around Current Status (): At least 60 percent but less than 80 percent impaired, limited or restricted  Mobility: Walking and Moving Around Goal Status (): At least 40 percent but less than 60 percent impaired, limited or restricted    Caren Nation, PTA  11/12/2018

## 2018-11-12 NOTE — PLAN OF CARE
Problem: Patient Care Overview  Goal: Plan of Care Review  Outcome: Ongoing (interventions implemented as appropriate)   11/12/18 0247   Coping/Psychosocial   Plan of Care Reviewed With patient   Plan of Care Review   Progress improving   OTHER   Outcome Summary pt trans to EOB mod of 2, sit-stand mod of 2, pt able to maintain TTWB LLE with cues, trans back to bed mod of 2. Pt would benefit from SNF

## 2018-11-12 NOTE — PROGRESS NOTES
Continued Stay Note   [facility]     Patient Name: Burke Reinoso Jr.  MRN: [id number]  Today's Date: 11/12/2018    Admit Date: 11/1/2018    Discharge Plan     Row Name 11/12/18 1541       Plan    Plan  [facility] pending precert    Patient/Family in Agreement with Plan  yes    Plan Comments  The nh is still waiting on approval again from insurance. All updated information has been sent in.         Discharge Codes    No documentation.       Expected Discharge Date and Time     Expected Discharge Date Expected Discharge Time    Nov 12, 2018             FAZAL Chan

## 2018-11-12 NOTE — DISCHARGE SUMMARY
"[facility]  DISCHARGE SUMMARY       Date of Admission: 11/1/2018  Date of Discharge:  11/12/2018  Primary Care Physician: Mg Colmenares MD    Presenting Problem/History of Present Illness:  Closed displaced comminuted fracture of shaft of left femur, initial encounter (CMS/[facility]) [S72.352A]     Final Discharge Diagnoses:  Active Hospital Problems    Diagnosis   • Closed displaced comminuted fracture of shaft of left femur (CMS/[facility])       Consults: Orthopedics Dr. Colon    Procedures Performed: Re-fixation left femur fracture    Pertinent Test Results: Hemoglobin stable at 9    History of Present Illness on Day of Discharge: Stable on discharge     Hospital Course:  The patient is a 88 y.o. male who presented to [facility] with a refracture of his left proximal femur.  The patient only recently was discharged following a trochanteric fixation nailing of an intertrochanteric hip fracture.  He went to the nursing facility and fell sustaining a fracture of the femoral shaft.  The patient was taken to surgery and the previous nail was removed and a long trochanteric fixation nail was implanted and locked proximally and distally up.  He is stable at this time.        /82 (BP Location: Right arm, Patient Position: Lying)   Pulse 92   Temp 98.4 °F (36.9 °C) (Oral)   Resp 16   Ht 182.9 cm (72\")   Wt 79.8 kg (175 lb 14.4 oz)   SpO2 98%   BMI 23.86 kg/m²       Discharge Medications:     Discharge Medications      ASK your doctor about these medications      Instructions Start Date   bisacodyl 5 MG EC tablet  Commonly known as:  DULCOLAX   10 mg, Oral, Daily PRN      carbamide peroxide 6.5 % otic solution  Commonly known as:  DEBROX   10 drops, Both Ears, Every Night at Bedtime      hydrochlorothiazide 25 MG tablet  Commonly known as:  HYDRODIURIL   25 mg, Oral, Daily      magnesium hydroxide 400 MG/5ML suspension  Commonly known as:  MILK OF MAGNESIA   5 mL, Oral, Daily PRN    "   polyethylene glycol packet  Commonly known as:  MIRALAX   17 g, Oral, Daily      QUEtiapine 50 MG tablet  Commonly known as:  SEROquel   50 mg, Oral, Nightly      vitamin B-12 1000 MCG tablet  Commonly known as:  CYANOCOBALAMIN   1,000 mcg, Oral, Every 14 Days, Fridays      vitamin D 53156 units capsule capsule  Commonly known as:  ERGOCALCIFEROL   50,000 Units, Oral, Weekly, On Fridays             Discharge Diet:  regular    Discharge Care Plan/Instructions: #1 strict toe-touch weightbearing on the left #2 is to follow-up in my office in 3 weeks' #3 the staples are to be removed in 3 days #4 he should take an 81 mg aspirin daily #5 there are call for any problems    Follow-up Appointments:   3 weeks      Juan Colon MD  11/12/18  12:23 PM

## 2018-11-12 NOTE — PLAN OF CARE
Problem: Patient Care Overview  Goal: Plan of Care Review  Outcome: Ongoing (interventions implemented as appropriate)   11/12/18 0353   Coping/Psychosocial   Plan of Care Reviewed With patient   Plan of Care Review   Progress no change   OTHER   Outcome Summary vss; alert and oriented to self only; [place]; c/o left hip pain with activity; incontinent of bladder; turned q 2 hours; continue to monitor; plan to d/c today     Goal: Individualization and Mutuality  Outcome: Ongoing (interventions implemented as appropriate)    Goal: Discharge Needs Assessment  Outcome: Ongoing (interventions implemented as appropriate)    Goal: Interprofessional Rounds/Family Conf  Outcome: Ongoing (interventions implemented as appropriate)      Problem: Fall Risk (Adult)  Goal: Absence of Fall  Outcome: Ongoing (interventions implemented as appropriate)      Problem: Nutrition, Imbalanced: Inadequate Oral Intake (Adult)  Goal: Improved Oral Intake  Outcome: Ongoing (interventions implemented as appropriate)    Goal: Prevent Further Weight Loss  Outcome: Ongoing (interventions implemented as appropriate)      Problem: Hip Arthroplasty (Total, Partial) (Adult)  Goal: Signs and Symptoms of Listed Potential Problems Will be Absent, Minimized or Managed (Hip Arthroplasty)  Outcome: Ongoing (interventions implemented as appropriate)

## 2018-11-12 NOTE — PROGRESS NOTES
Continued Stay Note  KOURTNEY Crain     Patient Name: Burke Reinoso Jr.  MRN: [id number]  Today's Date: 11/12/2018    Admit Date: 11/1/2018    Discharge Plan     Row Name 11/12/18 0836       Plan    Plan Comments  Faxed PT/MD notes to [facility] for precer updatet.  spoke with Jenna. Does not foresee dc today being an issue. Jenna will update [facility] with new MD/PT notes. faxed to [phone]. SNF to notify when insurance approves for dc today.  to follow.  Plan for dc today.          Discharge Codes    No documentation.             La Nena Bentley RN

## 2018-11-12 NOTE — DISCHARGE PLACEMENT REQUEST
"             Discharge Summary      Juan Colon MD at 11/12/2018 12:23 PM          [facility]  DISCHARGE SUMMARY       Date of Admission: 11/1/2018  Date of Discharge:  11/12/2018  Primary Care Physician: Mg Colmenares MD    Presenting Problem/History of Present Illness:  Closed displaced comminuted fracture of shaft of left femur, initial encounter (CMS/[facility]) [S72.352A]     Final Discharge Diagnoses:  Active Hospital Problems    Diagnosis   • Closed displaced comminuted fracture of shaft of left femur (CMS/[facility])       Consults: Orthopedics Dr. Colon    Procedures Performed: Re-fixation left femur fracture    Pertinent Test Results: Hemoglobin stable at 9    History of Present Illness on Day of Discharge: Stable on discharge     Hospital Course:  The patient is a 88 y.o. male who presented to [facility] with a refracture of his left proximal femur.  The patient only recently was discharged following a trochanteric fixation nailing of an intertrochanteric hip fracture.  He went to the nursing facility and fell sustaining a fracture of the femoral shaft.  The patient was taken to surgery and the previous nail was removed and a long trochanteric fixation nail was implanted and locked proximally and distally up.  He is stable at this time.        /82 (BP Location: Right arm, Patient Position: Lying)   Pulse 92   Temp 98.4 °F (36.9 °C) (Oral)   Resp 16   Ht 182.9 cm (72\")   Wt 79.8 kg (175 lb 14.4 oz)   SpO2 98%   BMI 23.86 kg/m²        Discharge Medications:     Discharge Medications      ASK your doctor about these medications      Instructions Start Date   bisacodyl 5 MG EC tablet  Commonly known as:  DULCOLAX   10 mg, Oral, Daily PRN      carbamide peroxide 6.5 % otic solution  Commonly known as:  DEBROX   10 drops, Both Ears, Every Night at Bedtime      hydrochlorothiazide 25 MG tablet  Commonly known as:  HYDRODIURIL   25 mg, Oral, Daily      magnesium hydroxide 400 " MG/5ML suspension  Commonly known as:  MILK OF MAGNESIA   5 mL, Oral, Daily PRN      polyethylene glycol packet  Commonly known as:  MIRALAX   17 g, Oral, Daily      QUEtiapine 50 MG tablet  Commonly known as:  SEROquel   50 mg, Oral, Nightly      vitamin B-12 1000 MCG tablet  Commonly known as:  CYANOCOBALAMIN   1,000 mcg, Oral, Every 14 Days, Fridays      vitamin D 57390 units capsule capsule  Commonly known as:  ERGOCALCIFEROL   50,000 Units, Oral, Weekly, On Fridays             Discharge Diet:  regular    Discharge Care Plan/Instructions: #1 strict toe-touch weightbearing on the left #2 is to follow-up in my office in 3 weeks' #3 the staples are to be removed in 3 days #4 he should take an 81 mg aspirin daily #5 there are call for any problems    Follow-up Appointments:   3 weeks      Juan Colon MD  11/12/18  12:23 PM        Electronically signed by Juan Colon MD at 11/12/2018 12:25 PM

## 2018-11-13 VITALS
HEART RATE: 100 BPM | DIASTOLIC BLOOD PRESSURE: 82 MMHG | TEMPERATURE: 98.1 F | WEIGHT: 175.9 LBS | RESPIRATION RATE: 16 BRPM | OXYGEN SATURATION: 97 % | SYSTOLIC BLOOD PRESSURE: 121 MMHG | BODY MASS INDEX: 23.83 KG/M2 | HEIGHT: 72 IN

## 2018-11-13 RX ADMIN — HYDROCHLOROTHIAZIDE 25 MG: 25 TABLET ORAL at 08:49

## 2018-11-13 RX ADMIN — POTASSIUM CHLORIDE 40 MEQ: 750 CAPSULE, EXTENDED RELEASE ORAL at 08:49

## 2018-11-13 RX ADMIN — BISACODYL 10 MG: 10 SUPPOSITORY RECTAL at 08:49

## 2018-11-13 RX ADMIN — APIXABAN 2.5 MG: 2.5 TABLET, FILM COATED ORAL at 05:36

## 2018-11-13 RX ADMIN — FAMOTIDINE 20 MG: 20 TABLET, FILM COATED ORAL at 08:49

## 2018-11-13 RX ADMIN — POLYETHYLENE GLYCOL 3350 17 G: 17 POWDER, FOR SOLUTION ORAL at 08:49

## 2018-11-13 NOTE — PLAN OF CARE
Problem: Skin Injury Risk (Adult)  Goal: Skin Health and Integrity  Outcome: Ongoing (interventions implemented as appropriate)      Problem: Patient Care Overview  Goal: Plan of Care Review  Outcome: Ongoing (interventions implemented as appropriate)   11/13/18 0604   Coping/Psychosocial   Plan of Care Reviewed With patient   Plan of Care Review   Progress no change   OTHER   Outcome Summary VSS. NV check wnl, ppp. Drsg c/d/i. Mepilex to heels intact. Changed drsg to esteban. No s/s of pain noted this shift, groans when repostioned then stops, denies pain. Prevalon boots/SCD in place. Safety maintained.       Problem: Fall Risk (Adult)  Goal: Absence of Fall  Outcome: Ongoing (interventions implemented as appropriate)      Problem: Nutrition, Imbalanced: Inadequate Oral Intake (Adult)  Goal: Improved Oral Intake  Outcome: Ongoing (interventions implemented as appropriate)      Problem: Hip Arthroplasty (Total, Partial) (Adult)  Goal: Signs and Symptoms of Listed Potential Problems Will be Absent, Minimized or Managed (Hip Arthroplasty)  Outcome: Ongoing (interventions implemented as appropriate)

## 2018-11-13 NOTE — THERAPY DISCHARGE NOTE
Acute Care - Physical Therapy Discharge Summary  [facility]       Patient Name: Burke Reinoso Jr.  : 1930  MRN: [id number]    Today's Date: 2018  Onset of Illness/Injury or Date of Surgery: 18    Date of Referral to PT: 18  Referring Physician: Dr. Colon      Admit Date: 2018      PT Recommendation and Plan    Visit Dx:    ICD-10-CM ICD-9-CM   1. Closed displaced comminuted fracture of shaft of left femur, initial encounter (CMS/[facility]) S72.352A 821.01   2. Dysphagia, unspecified type R13.10 787.20   3. Impaired mobility Z74.09 799.89       Outcome Measures     Row Name 18 1000 18 0800          How much help from another person do you currently need...    Turning from your back to your side while in flat bed without using bedrails?  2  -AH  2  -AH     Moving from lying on back to sitting on the side of a flat bed without bedrails?  2  -AH  2  -AH     Moving to and from a bed to a chair (including a wheelchair)?  1  -AH  1  -AH     Standing up from a chair using your arms (e.g., wheelchair, bedside chair)?  1  -AH  1  -AH     Climbing 3-5 steps with a railing?  1  -AH  1  -AH     To walk in hospital room?  1  -AH  1  -AH     AM-PAC 6 Clicks Score  8  -AH  8  -AH        Functional Assessment    Outcome Measure Options  AM-PAC 6 Clicks Basic Mobility (PT)  -AH  AM-PAC 6 Clicks Basic Mobility (PT)  -       User Key  (r) = Recorded By, (t) = Taken By, (c) = Cosigned By    Initials Name Provider Type     Caren Nation, PTA Physical Therapy Assistant            Therapy Suggested Charges     Code   Minutes Charges    47626 (CPT®) Hc Pt Neuromusc Re Education Ea 15 Min      41241 (CPT®) Hc Pt Ther Proc Ea 15 Min      62963 (CPT®) Hc Gait Training Ea 15 Min      45981 (CPT®) Hc Pt Therapeutic Act Ea 15 Min 23 2    03361 (CPT®) Hc Pt Manual Therapy Ea 15 Min      49503 (CPT®) Hc Pt Iontophoresis Ea 15 Min      69861 (CPT®) Hc Pt Elec Stim Ea-Per 15 Min      93385 (CPT®) Hc Pt  Ultrasound Ea 15 Min      26754 (CPT®) Hc Pt Self Care/Mgmt/Train Ea 15 Min      99736 (CPT®) Hc Pt Prosthetic (S) Train Initial Encounter, Each 15 Min      50068 (CPT®) Hc Pt Orthotic(S)/Prosthetic(S) Encounter, Each 15 Min      54264 (CPT®) Hc Orthotic(S) Mgmt/Train Initial Encounter, Each 15min      Total  23 2          PT Rehab Goals     Row Name 11/13/18 3086             Bed Mobility Goal 1 (PT)    Activity/Assistive Device (Bed Mobility Goal 1, PT)  rolling to right  -NW      [place] Level/Cues Needed (Bed Mobility Goal 1, PT)  minimum assist (75% or more patient effort)  -NW      Time Frame (Bed Mobility Goal 1, PT)  by discharge  -NW      Barriers (Bed Mobility Goal 1, PT)  pain, cognition  -NW      Progress/Outcomes (Bed Mobility Goal 1, PT)  goal not met  -NW         Bed Mobility Goal 2 (PT)    Activity/Assistive Device (Bed Mobility Goal 2, PT)  sit to supine/supine to sit  -NW      [place] Level/Cues Needed (Bed Mobility Goal 2, PT)  minimum assist (75% or more patient effort)  -NW      Time Frame (Bed Mobility Goal 2, PT)  by discharge  -NW      Barriers (Bed Mobility Goal 2, PT)  pain, cognition  -NW      Progress/Outcomes (Bed Mobility Goal 2, PT)  goal not met  -NW         Transfer Goal 1 (PT)    Activity/Assistive Device (Transfer Goal 1, PT)  sit-to-stand/stand-to-sit  -NW      [place] Level/Cues Needed (Transfer Goal 1, PT)  moderate assist (50-74% patient effort)  -NW      Time Frame (Transfer Goal 1, PT)  by discharge  -NW      Barriers (Transfers Goal 1, PT)  pain, cognition  -NW      Progress/Outcome (Transfer Goal 1, PT)  goal not met  -NW        User Key  (r) = Recorded By, (t) = Taken By, (c) = Cosigned By    Initials Name Provider Type Discipline    Anushka Snell PTA Physical Therapy Assistant PT              PT Discharge Summary  Anticipated Discharge Disposition (PT): skilled nursing facility  Reason for Discharge: Discharge from facility  Outcomes Achieved: Refer  to plan of care for updates on goals achieved  Discharge Destination: SNF      Anushka Camacho, PTA   11/13/2018

## 2018-11-14 NOTE — PAYOR COMM NOTE
"Dc to snf 11-13-18  [id number]  Ur phone   Fax     Burke Allen Jr. (88 y.o. Male)     Date of Birth Social Security Number Address Home Phone MRN    02/02/1930  [address] [facility] [address] [phone] [id number]    Congregational Marital Status          Mosque        Admission Date Admission Type Admitting Provider Attending Provider Department, Room/Bed    11/1/18 Emergency Silvino Markham [facility] 3A, 349/1    Discharge Date Discharge Disposition Discharge Destination        11/13/2018 Skilled Nursing Facility (DC - External)              Attending Provider:  (none)   Allergies:  No Known Allergies    Isolation:  None   Infection:  None   Code Status:  Prior    Ht:  182.9 cm (72\")   Wt:  79.8 kg (175 lb 14.4 oz)    Admission Cmt:  None   Principal Problem:  None                Active Insurance as of 11/1/2018     Primary Coverage     Payor Plan Insurance Group Employer/Plan Group    ANTHEM MEDICARE REPLACEMENT ANTHEM MEDICARE ADVANTAGE [id number]     Payor Plan Address Payor Plan Phone Number Payor Plan Fax Number Effective Dates    [address] [phone]  1/1/2018 - None Entered    [facility] [address]       Subscriber Name Subscriber Birth Date Member ID       BURKE ALLEN JR. 2/2/1930 [id number]           Secondary Coverage     Payor Plan Insurance Group Employer/Plan Group    [place]      Payor Plan Address Payor Plan Phone Number Payor Plan Fax Number Effective Dates    [address] [phone]  10/27/2018 - None Entered    [facility] [address]       Subscriber Name Subscriber Birth Date Member ID       BURKE ALLEN JR. 2/2/1930 [id number]                 Emergency Contacts      (Rel.) Home Phone Work Phone Mobile Phone    Holly Jarvis (Daughter) -- -- [phone]               Discharge Summary      Juan Colon MD at 11/12/2018 12:23 PM          [facility] " "[place]  DISCHARGE SUMMARY       Date of Admission: 11/1/2018  Date of Discharge:  11/12/2018  Primary Care Physician: gM Colmenares MD    Presenting Problem/History of Present Illness:  Closed displaced comminuted fracture of shaft of left femur, initial encounter (CMS/[facility]) [S72.352A]     Final Discharge Diagnoses:  Active Hospital Problems    Diagnosis   • Closed displaced comminuted fracture of shaft of left femur (CMS/[facility])       Consults: Orthopedics Dr. Colon    Procedures Performed: Re-fixation left femur fracture    Pertinent Test Results: Hemoglobin stable at 9    History of Present Illness on Day of Discharge: Stable on discharge     Hospital Course:  The patient is a 88 y.o. male who presented to [facility] with a refracture of his left proximal femur.  The patient only recently was discharged following a trochanteric fixation nailing of an intertrochanteric hip fracture.  He went to the nursing facility and fell sustaining a fracture of the femoral shaft.  The patient was taken to surgery and the previous nail was removed and a long trochanteric fixation nail was implanted and locked proximally and distally up.  He is stable at this time.        /82 (BP Location: Right arm, Patient Position: Lying)   Pulse 92   Temp 98.4 °F (36.9 °C) (Oral)   Resp 16   Ht 182.9 cm (72\")   Wt 79.8 kg (175 lb 14.4 oz)   SpO2 98%   BMI 23.86 kg/m²        Discharge Medications:     Discharge Medications      ASK your doctor about these medications      Instructions Start Date   bisacodyl 5 MG EC tablet  Commonly known as:  DULCOLAX   10 mg, Oral, Daily PRN      carbamide peroxide 6.5 % otic solution  Commonly known as:  DEBROX   10 drops, Both Ears, Every Night at Bedtime      hydrochlorothiazide 25 MG tablet  Commonly known as:  HYDRODIURIL   25 mg, Oral, Daily      magnesium hydroxide 400 MG/5ML suspension  Commonly known as:  MILK OF MAGNESIA   5 mL, Oral, Daily PRN      polyethylene glycol " packet  Commonly known as:  MIRALAX   17 g, Oral, Daily      QUEtiapine 50 MG tablet  Commonly known as:  SEROquel   50 mg, Oral, Nightly      vitamin B-12 1000 MCG tablet  Commonly known as:  CYANOCOBALAMIN   1,000 mcg, Oral, Every 14 Days, Fridays      vitamin D 49444 units capsule capsule  Commonly known as:  ERGOCALCIFEROL   50,000 Units, Oral, Weekly, On Fridays             Discharge Diet:  regular    Discharge Care Plan/Instructions: #1 strict toe-touch weightbearing on the left #2 is to follow-up in my office in 3 weeks' #3 the staples are to be removed in 3 days #4 he should take an 81 mg aspirin daily #5 there are call for any problems    Follow-up Appointments:   3 weeks      Juan Colon MD  11/12/18  12:23 PM        Electronically signed by Juan Colon MD at 11/12/2018 12:25 PM     Alejandra Martinez APRN at 11/13/2018  8:52 AM     Attestation signed by Silvino Markham DO at 11/13/2018  9:54 AM    I personally evaluated and examined the patient in conjunction with ALKA Laws and agree with the assessment, treatment plan, and disposition of the patient as recorded by her. My history, exam, and further recommendations are:     Agree with discharge.    Silvino Markham DO  11/13/18  9:54 AM                        [facility] Medicine Services  DISCHARGE SUMMARY       Date of Admission: 11/1/2018  Date of Discharge:  11/13/2018  Primary Care Physician: Mg Colmenares MD    Presenting Problem/History of Present Illness:  Closed displaced comminuted fracture of shaft of left femur, initial encounter (CMS/[facility]) [S72.352A]     Discharge Diagnoses:  1.  Acute traumatic comminuted periprosthetic fracture along the proximal diaphysis of the femur with removal of pre-existing trochanteric fixation nail  and placement of long trochanteric fixation nail left femur and cable of femoral shaft fracture 11/3/18  2.  Frequent falls  3.  Recent left  intertrochanteric hip fracture with trochanteric fixation nailing on 10/27/18  4.  Acute postop blood loss anemia, required transfusion  5.  Acutely anticoagulated with Eliquis for deep vein thrombosis prophylaxis secondary to #3  6.  Constipation, chronic  7.  Mild protein malnutrition   8.  Essential hypertension   9.  Leukocytosis, suspected reactive  10.  Hypokalemia, resolved  11.  Ceruminosis bilaterally    Chief Complaint on Day of Discharge: No complaints  History of Present Illness on Day of Discharge:   Lying in bed.  Daughter present in room.  He stood at bedside with physical therapy yesterday.  He denies nausea, vomiting or abdominal pain.  He denies chest pain, palpitations, or shortness of breath.  Had bowel movement yesterday.  Dr. Colon says okay for discharge.  Plans for staples to be DC'd on 11/15.    Consults: Dr. Juan Colon, orthopedics    Procedures Performed: 11/3/18,Dr. Colon   #1 removal of pre-existing trochanteric fixation nail   #2 placement of long trochanteric fixation nail left femur and cabling of femoral shaft fracture    Pertinent Test Results:   Laboratory Data:    Results from last 7 days   Lab Units  11/11/18   0428  11/10/18   0425  11/08/18   0434  11/07/18   0823   WBC 10*3/mm3   --    --    --   12.06*   HEMOGLOBIN g/dL  9.3*  9.2*  8.9*  9.0*   HEMATOCRIT %  29.5*  28.7*  27.3*  28.1*   PLATELETS 10*3/mm3   --    --    --   434*        Results from last 7 days   Lab Units  11/11/18   0428  11/07/18   0823   SODIUM mmol/L  137  138   POTASSIUM mmol/L  4.7  4.7   CHLORIDE mmol/L  97*  96*   CO2 mmol/L  31.0  31.0   BUN mg/dL  20  23*   CREATININE mg/dL  0.80  0.75   CALCIUM mg/dL  8.7  8.9   GLUCOSE mg/dL  93  98     Imaging Results (all)     Procedure Component Value Units Date/Time    XR Femur 2 View Left [[id number]] Collected:  11/03/18 1254     Updated:  11/04/18 0939    Narrative:       EXAMINATION:   XR FEMUR 2 VW LEFT-  11/3/2018 12:54 PM CDT     HISTORY:  Fractured left femur     9 images are obtained in the operating room.     Orthopedic pins present in the left femoral head and neck. Femoral nail  is present. Fracture fragments are relatively aligned. Cerclage wires  are present.     3 minutes 12 seconds of fluoroscopic time was used during this procedure  This report was finalized on 11/03/2018 12:56 by Dr. Donis Andrews MD.    FL C Arm During Surgery [[id number]] Collected:  11/03/18 1254     Updated:  11/04/18 0939    Narrative:       EXAMINATION:   XR FEMUR 2 VW LEFT-  11/3/2018 12:54 PM CDT     HISTORY: Fractured left femur     9 images are obtained in the operating room.     Orthopedic pins present in the left femoral head and neck. Femoral nail  is present. Fracture fragments are relatively aligned. Cerclage wires  are present.     3 minutes 12 seconds of fluoroscopic time was used during this procedure  This report was finalized on 11/03/2018 12:56 by Dr. Donis Andrews MD.    XR Abdomen KUB [[id number]] Collected:  11/02/18 0815     Updated:  11/02/18 0822    Narrative:       EXAMINATION: KUB 11/20/2018     HISTORY: Belly pain and leukocytosis     FINDINGS: KUB radiograph demonstrates mild constipation with a  nonspecific bowel gas pattern. There is no obstruction or free air.  There is arthritic change of the right hip and previous fixation for a  left hip fracture. A Singleton catheter is in place.       Impression:       . Mild constipation. There is a nonspecific bowel gas  pattern. There is no obstruction or free air.  This report was finalized on 11/02/2018 08:19 by Dr. Harvey Landry MD.    XR Femur 2 View Left [[id number]] Collected:  11/02/18 0710     Updated:  11/02/18 0718    Narrative:       LEFT FEMUR, 2 VIEWS 11/2/2018 12:12 AM CDT     HISTORY: fall, pain; S72.352A-Displaced comminuted fracture of shaft of  left femur, initial encounter for closed fracture     COMPARISON: Intraoperative fluoroscopic images dated 10/27/2018     FINDINGS:      Frontal and lateral radiographs of the left femur were obtained.     TFN recently placed. New periprosthetic fracture with comminution of the  proximal diaphysis. Large displaced butterfly fragments are present and  there is foreshortening up to 7 cm. Distal femur appears intact.       Impression:       1. New comminuted periprosthetic fracture along the proximal diaphysis  of femur.  This report was finalized on 11/02/2018 07:15 by Dr Babak Franklin, .    XR Chest 1 View [[id number]] Collected:  11/02/18 0657     Updated:  11/02/18 0701    Narrative:       EXAMINATION:   XR CHEST 1 VW-  11/2/2018 6:57 AM CDT     HISTORY: Preop     Frontal upright radiograph of the chest 11/2/2018 1:31 AM CDT     COMPARISON: October 27, 2018.     FINDINGS:   The lungs are clear. The cardiac silhouette is normal. Vascular  calcifications present aortic arch..      The osseous structures and surrounding soft tissues demonstrate no acute  abnormality.       Impression:       1. No radiographic evidence of acute cardiopulmonary process.        This report was finalized on 11/02/2018 06:57 by Dr. Donis Andrews MD.        Hospital Course  Patient is a 88 y.o. male presented to [facility] emergency room from [facility] on 11/1/18 after an unwitnessed fall at the nursing home.  Patient was recently at our facility 10/28/18-10/31/18 after left trochanteric fixation nailing by Isaiah.  After fall at nursing home he was found to have new comminuted periprosthetic fracture along the proximal diaphysis of fracture.  Patient was discharged on Coumadin for deep vein thrombosis prophylaxis on 10/31/18.  Most recent INR 1.46.  Hemoglobin 8.7.  Coumadin placed on hold.    He was admitted to the orthopedic floor.  He was made nothing by mouth.  He was seen in consultation by Dr. Juan Colon with orthopedics and recommendations were for repeat fixation left femur fracture with longer nail.  As patient was on Coumadin he  received fresh frozen plasma and was transfused 2 units packed red blood cells.  On 11/3/18 Dr. Colon removed pre-existing trochanteric fixation nail and replaced long trochanteric fixation nail left femur and cabling of femoral shaft fracture.  Plans were for slow physical therapy as Dr. Colon did not feel that patient was going to be able to protect his weight-bearing.  Physical therapy and occupational therapy consulted.  Eventually, patient was allowed strict toe-touch weightbearing left lower extremity.  Dr. Colon elected for patient to remain in the hospital over the weekend as there were some erythema along staple line.  Dr. Colon evaluated patient on 11/12/18 and cleared for discharge.  Staples may be removed on 11/15/18.  He will follow-up with Dr. Colon in 3 weeks on 12/19/18.     In addition, patient required transfusion 4 units packed red blood cells and total of 4 units fresh frozen plasma during hospital stay.  He was placed on Eliquis 2.5 mg twice daily for deep vein thrombosis prophylaxis.  May discontinue Eliquis after last dose on 12/4/18.  H/H has remained stable with hemoglobin 9.3 hematocrit 29.5 at discharge.  No signs of bleeding.    Patient has chronic constipation.  He had bowel movement day prior to discharge.  Hypertension has remained stable.  Potassium was low.  He received oral replacement and potassium 4.7 at discharge.    He is extremely hard of hearing.  He wears hearing aids.  Daughter thought hearing was worse.  He was noted have ceruminosis bilaterally and treated with Debros ear drops.    He has confusion at baseline.    He was noted to have bilateral heel breakdown with 2 cm ×1 cm reddened area left heel and 1 cm reddened area right heel.  Mepilex to both heels.  Prolonged boots in place.    On 11/12/18 he is stable for discharge.  He stood at the bedside with physical therapy.  Left hip staple line clean and dry.  [place] intact and may be discontinued on 11/15/18.   "He denies nausea, vomiting or abdominal pain.  He denies chest pain, palpitations or shortness of breath.  Discontinue Eliquis after last dose on 12/4/18.  Follow-up with Dr. Colon 12/19/18.    Physical Exam on Discharge:  /82 (BP Location: Right arm, Patient Position: Lying)   Pulse 100   Temp 98.1 °F (36.7 °C) (Oral)   Resp 16   Ht 182.9 cm (72\")   Wt 79.8 kg (175 lb 14.4 oz)   SpO2 97%   BMI 23.86 kg/m²    Physical Exam  Constitutional: He appears well-developed and well-nourished.   Head: Normocephalic and atraumatic. Hard of hearing.  Ear wax noted both ear canals.   Eyes: Pupils are equal, round, and reactive to light. EOM are normal. Eyeglasses in place.  Neck: Normal range of motion. Neck supple.   Cardiovascular: Normal rate, regular rhythm, normal heart sounds and intact distal pulses.  Exam reveals no gallop and no friction rub.  No murmur heard.  Pulmonary/Chest: Effort normal and breath sounds normal. No respiratory distress. He has no wheezes. He has no rales. No oxygen in use   Abdominal: Soft. Bowel sounds are normal. There is no distention or tenderness.   Genitourinary Comments: Incontinent. Diaper in place  Musculoskeletal: He exhibits edema (Left lower extremity, improving) and tenderness (Left hip).   SCDs bilateral lower extremities   Neurological: Identifies self and follows commands  Skin: Skin is warm and dry. Staples intact left hip. Less redness at staple line. Left heel with 2 cm x 1.5 cm red area, right heel with 1 cm red area. No open areas on heels. Mepelix to heels. Prevalon boots in place.  Psychiatric: He has a normal mood and affect. His behavior is normal. Judgment and thought content normal.     Condition on Discharge: Stable    Discharge Disposition:  [facility]    Discharge Diet:   Diet Instructions     Advance Diet As Tolerated        As tolerated    Activity at Discharge:   Activity Instructions     Discharge Activity Restrictions      " Activity at Discharge:    1.  Strict toe-touch weightbearing left lower extremity    Discharge Care Plan / Instructions:   1.  Strict toe-touch weightbearing left lower extremity  2.  Discontinue [place] left hip 11/15/18  3.  Follow with Dr. Colon 12/19/18  4.  Eliquis 2.5 mg twice daily. Discontinue after last dose on 12/4/18  5.  Mepilex to bilateral heel.  Prevalon boots boot feet.         1.  Strict toe-touch weightbearing left lower extremity    Discharge Care Plan / Instructions:   1.  Strict toe-touch weightbearing left lower extremity  2.  Discontinue [place] left hip 11/15/18  3.  Follow with Dr. Colon 12/19/18  4.  Eliquis 2.5 mg twice daily. Discontinue after last dose on 12/4/18  5.  Mepilex to bilateral heel.  Prevalon boots boot feet.    Discharge Medications:     Discharge Medications      New Medications      Instructions Start Date   apixaban 2.5 MG tablet tablet  Commonly known as:  ELIQUIS   2.5 mg, Oral, Every 12 Hours Scheduled, Discontinue after last dose on 12/4/18      HYDROcodone-acetaminophen 5-325 MG per tablet  Commonly known as:  NORCO   1 tablet, Oral, Every 6 Hours PRN         Continue These Medications      Instructions Start Date   bisacodyl 5 MG EC tablet  Commonly known as:  DULCOLAX   10 mg, Oral, Daily PRN      carbamide peroxide 6.5 % otic solution  Commonly known as:  DEBROX   10 drops, Both Ears, Every Night at Bedtime      hydrochlorothiazide 25 MG tablet  Commonly known as:  HYDRODIURIL   25 mg, Oral, Daily      magnesium hydroxide 400 MG/5ML suspension  Commonly known as:  MILK OF MAGNESIA   5 mL, Oral, Daily PRN      polyethylene glycol packet  Commonly known as:  MIRALAX   17 g, Oral, Daily      QUEtiapine 50 MG tablet  Commonly known as:  SEROquel   50 mg, Oral, Nightly      vitamin B-12 1000 MCG tablet  Commonly known as:  CYANOCOBALAMIN   1,000 mcg, Oral, Every 14 Days, Fridays      vitamin D 49320 units capsule capsule  Commonly known as:  ERGOCALCIFEROL   50,000  Units, Oral, Weekly, On Fridays           Follow-up Appointments:    Contact information for follow-up providers     Juan Colon MD Follow up on 12/19/2018.    Specialty:  Orthopedic Surgery  Why:  12-19 at 130  Contact information:  [address] [facility] [address]  [phone]             Mg Colmenares MD Follow up.    Specialty:  Family Medicine  Why:  to be followed by MD at [facility]  Contact information:  [address] [facility] [address]  [phone]                   Contact information for after-discharge care     Destination     [facility] Follow up.    Service:  Skilled Nursing  Contact information:  [address] [facility] [id number] [phone]                           Future Appointments:  No future appointments.    Test Results Pending at Discharge: none    The above documentation resulted from a face-to-face encounter by me Alejandra RUCKER, [facility].    ALKA Abdul  11/13/18  8:59 AM    Time:  This discharge process required 40 minutes for completion.     Plan discussed with , patient, and daughter.    Time spent in face-to-face evaluation, chart review, planning and education 40 minutes.  Please note that portions of this note may have been completed with a voice recognition program. Efforts were made to edit the dictations, but occasionally words are mistranscribed.            Electronically signed by Silvino Markham DO at 11/13/2018  9:54 AM

## 2024-07-09 NOTE — ANESTHESIA PREPROCEDURE EVALUATION
Anesthesia Evaluation     NPO Solid Status: > 8 hours  NPO Liquid Status: > 8 hours           Airway   Mallampati: II  TM distance: >3 FB  Neck ROM: full  No difficulty expected  Dental    (+) upper dentures and lower dentures    Pulmonary - negative pulmonary ROS and normal exam   Cardiovascular - normal exam  Exercise tolerance: poor (<4 METS)    ECG reviewed  PT is on anticoagulation therapy    (+) hypertension well controlled,       Neuro/Psych  (+) psychiatric history, dementia,     GI/Hepatic/Renal/Endo    (+)  GERD well controlled,      Musculoskeletal     Abdominal  - normal exam   Substance History - negative use     OB/GYN negative ob/gyn ROS         Other   (+) arthritis                     Anesthesia Plan    ASA 3 - emergent     general   (Hx per pt's daughter d/t dementia and confusion    Lab Results       Component                Value               Date                       WBC                      10.63               11/03/2018                 HGB                      8.9 (L)             11/03/2018                 HGB                      8.9 (L)             11/03/2018                 HCT                      26.8 (L)            11/03/2018                 HCT                      26.8 (L)            11/03/2018                 MCV                      84.5                11/03/2018                 PLT                      339                 11/03/2018            Results for FCO ALLEN JR. (MRN [id number]) as of 11/3/2018 11:18    11/3/2018 07:49  Protime: 16.2 (H)  INR: 1.26 (H)  Dr mccoy aware of above labs orders to transfuse 2 ffp and one prbc at this time)  intravenous induction   Anesthetic plan, all risks, benefits, and alternatives have been provided, discussed and informed consent has been obtained with: patient.  Use of blood products discussed with patient .     
no

## (undated) DEVICE — BNDG ELAS CO-FLEX SLF ADHR 6IN 5YD LF STRL

## (undated) DEVICE — PROXIMATE RH ROTATING HEAD SKIN STAPLERS (35 REGULAR) CONTAINS 35 STAINLESS STEEL STAPLES: Brand: PROXIMATE

## (undated) DEVICE — SPNG GZ WOVN 4X4IN 12PLY 10/BX STRL

## (undated) DEVICE — GW 3.2X475MM

## (undated) DEVICE — Device

## (undated) DEVICE — PK HIP ORIF FX TABL 30

## (undated) DEVICE — SUT VIC 0 SUTUPAK TIES 18IN J906G

## (undated) DEVICE — GW FOR TROCH NAIL 3.2X400MM

## (undated) DEVICE — BIT DRL 3FLUT QC CALIB 4.2X330X100MM

## (undated) DEVICE — GLV SURG TRIUMPH PF LTX 7.5 STRL

## (undated) DEVICE — GLV SURG TRIUMPH GREEN W/ALOE PF LTX 8 STRL

## (undated) DEVICE — SUT GUT CHRM 2/0 CT1 36IN 923H

## (undated) DEVICE — DRSNG WND GZ CURAD OIL EMULSION 3X8IN STRL PK/3

## (undated) DEVICE — PK TURNOVER RM ADV

## (undated) DEVICE — CLTH CLENS READYCLEANSE PERI CARE PK/5

## (undated) DEVICE — TRY PREP SCRB VAG PVP